# Patient Record
Sex: FEMALE | Race: WHITE | Employment: OTHER | URBAN - METROPOLITAN AREA
[De-identification: names, ages, dates, MRNs, and addresses within clinical notes are randomized per-mention and may not be internally consistent; named-entity substitution may affect disease eponyms.]

---

## 2017-02-13 ENCOUNTER — TELEPHONE (OUTPATIENT)
Dept: FAMILY MEDICINE | Facility: CLINIC | Age: 67
End: 2017-02-13

## 2017-02-13 NOTE — LETTER
[facility]  [address] [facility] [address] [facility] [id number] [phone]  February 20, 2017    Jeannine Carpio  [address] [facility] [address]    Dear Jeannine,    I care about your health and have reviewed your health plan. I have reviewed your medical conditions, medication list, and lab results and am making recommendations based on this review, to better manage your health.    You are in particular need of attention regarding:  -Depression  -Breast Cancer Screening  -Cervical Cancer Screening    I am recommending that you:  recommendations:-schedule a WELLNESS (Physical) APPOINTMENT with me.   I will check fasting labs the same day - nothing to eat except water and meds for 8-10 hours prior.    Here is a list of Health Maintenance topics that are due now or due soon:  Health Maintenance Due   Topic Date Due     HEPATITIS C SCREENING  05/04/1970     MAMMO SCREEN Q2 YR (SYSTEM ASSIGNED)  10/24/2008     PHQ-9 Q6 MONTHS (NO INBASKET)  02/18/2016     PAP Q1 YR  08/18/2016     FIT Q1 YR (NO INBASKET)  08/21/2016     INFLUENZA VACCINE (SYSTEM ASSIGNED)  09/01/2016     ADVANCE DIRECTIVE PLANNING Q5 YRS (NO INBASKET)  02/17/2017       Please call us at [phone] (or use [employer]) to address the above recommendations.     Thank you for trusting [facility] and we appreciate the opportunity to serve you.  We look forward to supporting your healthcare needs in the future.    Healthy Regards,    Susan Haase CNP/lf

## 2017-02-13 NOTE — TELEPHONE ENCOUNTER
Panel Management Review      Patient has the following on her problem list:     Depression / Dysthymia review  PHQ-9 SCORE 6/22/2012 4/10/2013 8/18/2015   Total Score 18 12 16      Patient is due for:  PHQ9 and DAP      Composite cancer screening  Chart review shows that this patient is due/due soon for the following Pap Smear, Mammogram and Fecal Colorectal (FIT)  Summary:    Patient is due/failing the following:   FIT, MAMMOGRAM, PAP and PHQ9    Action needed:   Patient needs office visit for physical and depression follow up.    Type of outreach:    [employer] message.    Questions for provider review:    None                                                                                                                                    Jennifer Castro, [facility]       Chart routed to Care Team .

## 2017-02-20 NOTE — TELEPHONE ENCOUNTER
LMTRC on home/cell number, [employer] message sent    Loretta Hernandez/CMA  [place]

## 2017-06-03 ENCOUNTER — HEALTH MAINTENANCE LETTER (OUTPATIENT)
Age: 67
End: 2017-06-03

## 2017-07-25 ENCOUNTER — OFFICE VISIT (OUTPATIENT)
Dept: FAMILY MEDICINE | Facility: CLINIC | Age: 67
End: 2017-07-25
Payer: MEDICARE

## 2017-07-25 VITALS
BODY MASS INDEX: 16.71 KG/M2 | SYSTOLIC BLOOD PRESSURE: 130 MMHG | TEMPERATURE: 98.1 F | DIASTOLIC BLOOD PRESSURE: 70 MMHG | WEIGHT: 97.9 LBS | OXYGEN SATURATION: 98 % | RESPIRATION RATE: 12 BRPM | HEART RATE: 82 BPM | HEIGHT: 64 IN

## 2017-07-25 DIAGNOSIS — Z12.11 SPECIAL SCREENING FOR MALIGNANT NEOPLASMS, COLON: ICD-10-CM

## 2017-07-25 DIAGNOSIS — R63.4 WEIGHT LOSS, UNINTENTIONAL: ICD-10-CM

## 2017-07-25 DIAGNOSIS — B35.1 DERMATOPHYTOSIS OF NAIL: ICD-10-CM

## 2017-07-25 DIAGNOSIS — F32.0 MILD MAJOR DEPRESSION (H): ICD-10-CM

## 2017-07-25 DIAGNOSIS — E55.9 VITAMIN D INSUFFICIENCY: ICD-10-CM

## 2017-07-25 DIAGNOSIS — Z00.00 MEDICARE WELCOME VISIT: Primary | ICD-10-CM

## 2017-07-25 DIAGNOSIS — Z12.4 CERVICAL CANCER SCREENING: ICD-10-CM

## 2017-07-25 DIAGNOSIS — R73.09 ELEVATED GLUCOSE: ICD-10-CM

## 2017-07-25 DIAGNOSIS — M25.50 PAIN IN JOINT, MULTIPLE SITES: ICD-10-CM

## 2017-07-25 LAB
ALBUMIN SERPL-MCNC: 4 G/DL (ref 3.4–5)
ALP SERPL-CCNC: 73 U/L (ref 40–150)
ALT SERPL W P-5'-P-CCNC: 25 U/L (ref 0–50)
ANION GAP SERPL CALCULATED.3IONS-SCNC: 6 MMOL/L (ref 3–14)
AST SERPL W P-5'-P-CCNC: 17 U/L (ref 0–45)
BASOPHILS # BLD AUTO: 0.1 10E9/L (ref 0–0.2)
BASOPHILS NFR BLD AUTO: 0.6 %
BILIRUB SERPL-MCNC: 0.9 MG/DL (ref 0.2–1.3)
BUN SERPL-MCNC: 11 MG/DL (ref 7–30)
CALCIUM SERPL-MCNC: 9.4 MG/DL (ref 8.5–10.1)
CHLORIDE SERPL-SCNC: 104 MMOL/L (ref 94–109)
CHOLEST SERPL-MCNC: 224 MG/DL
CO2 SERPL-SCNC: 28 MMOL/L (ref 20–32)
CREAT SERPL-MCNC: 0.76 MG/DL (ref 0.52–1.04)
CRP SERPL-MCNC: <2.9 MG/L (ref 0–8)
DEPRECATED CALCIDIOL+CALCIFEROL SERPL-MC: 50 UG/L (ref 20–75)
DIFFERENTIAL METHOD BLD: ABNORMAL
EOSINOPHIL # BLD AUTO: 0.3 10E9/L (ref 0–0.7)
EOSINOPHIL NFR BLD AUTO: 3.5 %
ERYTHROCYTE [DISTWIDTH] IN BLOOD BY AUTOMATED COUNT: 13.1 % (ref 10–15)
ERYTHROCYTE [SEDIMENTATION RATE] IN BLOOD BY WESTERGREN METHOD: 6 MM/H (ref 0–30)
GFR SERPL CREATININE-BSD FRML MDRD: 76 ML/MIN/1.7M2
GLUCOSE SERPL-MCNC: 103 MG/DL (ref 70–99)
HCT VFR BLD AUTO: 45.7 % (ref 35–47)
HDLC SERPL-MCNC: 52 MG/DL
HGB BLD-MCNC: 16 G/DL (ref 11.7–15.7)
LDLC SERPL CALC-MCNC: 142 MG/DL
LYMPHOCYTES # BLD AUTO: 1.5 10E9/L (ref 0.8–5.3)
LYMPHOCYTES NFR BLD AUTO: 18.6 %
MCH RBC QN AUTO: 31.4 PG (ref 26.5–33)
MCHC RBC AUTO-ENTMCNC: 35 G/DL (ref 31.5–36.5)
MCV RBC AUTO: 90 FL (ref 78–100)
MONOCYTES # BLD AUTO: 0.6 10E9/L (ref 0–1.3)
MONOCYTES NFR BLD AUTO: 7.3 %
NEUTROPHILS # BLD AUTO: 5.5 10E9/L (ref 1.6–8.3)
NEUTROPHILS NFR BLD AUTO: 70 %
NONHDLC SERPL-MCNC: 172 MG/DL
PLATELET # BLD AUTO: 220 10E9/L (ref 150–450)
POTASSIUM SERPL-SCNC: 3.9 MMOL/L (ref 3.4–5.3)
PROT SERPL-MCNC: 7 G/DL (ref 6.8–8.8)
RBC # BLD AUTO: 5.1 10E12/L (ref 3.8–5.2)
SODIUM SERPL-SCNC: 138 MMOL/L (ref 133–144)
T4 FREE SERPL-MCNC: 1.16 NG/DL (ref 0.76–1.46)
TRIGL SERPL-MCNC: 150 MG/DL
TSH SERPL DL<=0.005 MIU/L-ACNC: 5.56 MU/L (ref 0.4–4)
WBC # BLD AUTO: 7.9 10E9/L (ref 4–11)

## 2017-07-25 PROCEDURE — G0145 SCR C/V CYTO,THINLAYER,RESCR: HCPCS | Performed by: NURSE PRACTITIONER

## 2017-07-25 PROCEDURE — 80050 GENERAL HEALTH PANEL: CPT | Performed by: NURSE PRACTITIONER

## 2017-07-25 PROCEDURE — 36415 COLL VENOUS BLD VENIPUNCTURE: CPT | Performed by: NURSE PRACTITIONER

## 2017-07-25 PROCEDURE — 86140 C-REACTIVE PROTEIN: CPT | Performed by: NURSE PRACTITIONER

## 2017-07-25 PROCEDURE — 84439 ASSAY OF FREE THYROXINE: CPT | Performed by: NURSE PRACTITIONER

## 2017-07-25 PROCEDURE — 85652 RBC SED RATE AUTOMATED: CPT | Performed by: NURSE PRACTITIONER

## 2017-07-25 PROCEDURE — 80061 LIPID PANEL: CPT | Performed by: NURSE PRACTITIONER

## 2017-07-25 PROCEDURE — G0402 INITIAL PREVENTIVE EXAM: HCPCS | Performed by: NURSE PRACTITIONER

## 2017-07-25 PROCEDURE — G0476 HPV COMBO ASSAY CA SCREEN: HCPCS | Performed by: NURSE PRACTITIONER

## 2017-07-25 PROCEDURE — G0123 SCREEN CERV/VAG THIN LAYER: HCPCS | Performed by: NURSE PRACTITIONER

## 2017-07-25 PROCEDURE — 82306 VITAMIN D 25 HYDROXY: CPT | Performed by: NURSE PRACTITIONER

## 2017-07-25 PROCEDURE — 86431 RHEUMATOID FACTOR QUANT: CPT | Performed by: NURSE PRACTITIONER

## 2017-07-25 RX ORDER — CICLOPIROX 80 MG/ML
SOLUTION TOPICAL
Qty: 1 BOTTLE | Refills: 0 | Status: SHIPPED | OUTPATIENT
Start: 2017-07-25 | End: 2018-02-19

## 2017-07-25 NOTE — NURSING NOTE
"Chief Complaint   Patient presents with     Medicare Visit       Initial /70 (BP Location: Left arm, Patient Position: Chair, Cuff Size: Adult Regular)  Pulse 82  Temp 98.1  F (36.7  C) (Oral)  Resp 12  Ht 5' 4\" (1.626 m)  Wt 97 lb 14.4 oz (44.4 kg)  SpO2 98%  BMI 16.8 kg/m2 Estimated body mass index is 16.8 kg/(m^2) as calculated from the following:    Height as of this encounter: 5' 4\" (1.626 m).    Weight as of this encounter: 97 lb 14.4 oz (44.4 kg).  Medication Reconciliation: complete   Jennifer Castro CMA      "

## 2017-07-25 NOTE — MR AVS SNAPSHOT
After Visit Summary   7/25/2017    Jeannine Carpio    MRN: [id number]           Patient Information     Date Of Birth          1950        Visit Information        Provider Department      7/25/2017 8:00 AM Haase, Susan Rachele, APRN [facility]        Today's Diagnoses     [facility]    -  1    Elevated glucose        Pain in joint, multiple sites        Weight loss, unintentional        Cervical cancer screening        Special screening for malignant neoplasms, colon        Mild major depression (H)        Vitamin D insufficiency        Dermatophytosis of nail          Care Instructions      Preventive Health Recommendations    Female Ages 65 +    Yearly exam:     See your health care provider every year in order to  o Review health changes.   o Discuss preventive care.    o Review your medicines if your doctor has prescribed any.      You no longer need a yearly Pap test unless you've had an abnormal Pap test in the past 10 years. If you have vaginal symptoms, such as bleeding or discharge, be sure to talk with your provider about a Pap test.      Every 1 to 2 years, have a mammogram.  If you are over 69, talk with your health care provider about whether or not you want to continue having screening mammograms.      Every 10 years, have a colonoscopy. Or, have a yearly FIT test (stool test). These exams will check for colon cancer.       Have a cholesterol test every 5 years, or more often if your doctor advises it.       Have a diabetes test (fasting glucose) every three years. If you are at risk for diabetes, you should have this test more often.       At age 65, have a bone density scan (DEXA) to check for osteoporosis (brittle bone disease).    Shots:    Get a flu shot each year.    Get a tetanus shot every 10 years.    Talk to your doctor about your pneumonia vaccines. There are now two you should receive - Pneumovax (PPSV 23) and Prevnar (PCV 13).    Talk to  your doctor about the shingles vaccine.    Talk to your doctor about the hepatitis B vaccine.    Nutrition:     Eat at least 5 servings of fruits and vegetables each day.      Eat whole-grain bread, whole-wheat pasta and brown rice instead of white grains and rice.      Talk to your provider about Calcium and Vitamin D.     Lifestyle    Exercise at least 150 minutes a week (30 minutes a day, 5 days a week). This will help you control your weight and prevent disease.      Limit alcohol to one drink per day.      No smoking.       Wear sunscreen to prevent skin cancer.       See your dentist twice a year for an exam and cleaning.      See your eye doctor every 1 to 2 years to screen for conditions such as glaucoma, macular degeneration and cataracts.          Follow-ups after your visit        Additional Services     MENTAL HEALTH REFERRAL       Your provider has referred you to: [facility]: [facility] - Counseling (Individual/Couples/Family) - [facility] [phone]   [email or web address]/   *Patient will be contacted by [facility] scheduling partner, [facility]), to schedule an appointment.  Patients may also call [facility] to schedule.    All scheduling is subject to the client's specific insurance plan & benefits, provider/location availability, and provider clinical specialities.  Please arrive 15 minutes early for your first appointment and bring your completed paperwork.    Please be aware that coverage of these services is subject to the terms and limitations of your health insurance plan.  Call member services at your health plan with any benefit or coverage questions.                  Your next 10 appointments already scheduled     Aug 02, 2017  8:45 AM CDT   MA SCREENING DIGITAL BILATERAL with [facility]   [facility]    [address]  [place] [address]   [phone]           Do not use any powder, lotion or deodorant under your arms or on your breast. If you do, we will ask you to remove it before your exam.  Wear comfortable, two-piece clothing.  If you have any allergies, tell your care team.  Bring any previous mammograms from other facilities or have them mailed to the breast center.              Future tests that were ordered for you today     Open Future Orders        Priority Expected Expires Ordered    Fecal colorectal cancer screen (FIT) Routine 8/15/2017 10/17/2017 7/25/2017            Who to contact     If you have questions or need follow up information about today's clinic visit or your schedule please contact [facility] directly at [phone].  Normal or non-critical lab and imaging results will be communicated to you by [employer], letter or phone within 4 business days after the clinic has received the results. If you do not hear from us within 7 days, please contact the clinic through [employer] or phone. If you have a critical or abnormal lab result, we will notify you by phone as soon as possible.  Submit refill requests through [employer] or call your pharmacy and they will forward the refill request to us. Please allow 3 business days for your refill to be completed.          Additional Information About Your Visit        [employer] Information     [employer] gives you secure access to your electronic health record. If you see a primary care provider, you can also send messages to your care team and make appointments. If you have questions, please call your primary care clinic.  If you do not have a primary care provider, please call [phone] and they will assist you.        Care EveryWhere ID     This is your Care EveryWhere ID. This could be used by other organizations to access your [facility] medical records  [id number]        Your Vitals Were     Pulse Temperature Respirations Height Pulse Oximetry BMI (Body Mass  "Index)    82 98.1  F (36.7  C) (Oral) 12 5' 4\" (1.626 m) 98% 16.8 kg/m2       Blood Pressure from Last 3 Encounters:   07/25/17 130/70   08/18/15 120/64   07/08/13 114/64    Weight from Last 3 Encounters:   07/25/17 97 lb 14.4 oz (44.4 kg)   08/18/15 107 lb (48.5 kg)   07/08/13 107 lb (48.5 kg)              We Performed the Following     CBC with platelets differential     Comprehensive metabolic panel     CRP inflammation     Erythrocyte sedimentation rate auto     Lipid panel reflex to direct LDL     MENTAL HEALTH REFERRAL     PAP imaged thin layer screen     Rheumatoid factor     TSH with free T4 reflex     Vitamin D Deficiency          Today's Medication Changes          These changes are accurate as of: 7/25/17  8:54 AM.  If you have any questions, ask your nurse or doctor.               Start taking these medicines.        Dose/Directions    ciclopirox 8 % Soln   Used for:  Dermatophytosis of nail   Started by:  Haase, Susan Rachele, APRN CNP        Apply to adjacent skin and affected nails daily.  Remove with alcohol every 7 days, then repeat.   Quantity:  1 Bottle   Refills:  0            Where to get your medicines      These medications were sent to [id number] - [address] [address]    Hours:  9Am-9Pm (M-F) 9Am-6Pm (S&S) Phone:  [phone]     ciclopirox 8 % Soln                Primary Care Provider Office Phone # Fax #    Susan Rachele Haase, APRN [phone]       [facility] [id number] [place] [address]        Equal Access to Services     OLVIN GEORGE : Hadrafael Feldman, jung chester, fran patterson. So [facility] [phone].    ATENCIÓN: Si habla español, tiene a colindres disposición servicios gratuitos de asistencia lingüística. Llame al [phone].    We comply with applicable federal civil rights laws and [place] laws. We do not " discriminate on the basis of race, color, national origin, age, disability sex, sexual orientation or gender identity.            Thank you!     Thank you for choosing [facility]  for your care. Our goal is always to provide you with excellent care. Hearing back from our patients is one way we can continue to improve our services. Please take a few minutes to complete the written survey that you may receive in the mail after your visit with us. Thank you!             Your Updated Medication List - Protect others around you: Learn how to safely use, store and throw away your medicines at www.disposemymeds.org.          This list is accurate as of: 7/25/17  8:54 AM.  Always use your most recent med list.                   Brand Name Dispense Instructions for use Diagnosis    ciclopirox 8 % Soln     1 Bottle    Apply to adjacent skin and affected nails daily.  Remove with alcohol every 7 days, then repeat.    Dermatophytosis of nail       OMEPRAZOLE PO           vitamin D 2000 UNITS tablet      Take 1 tablet by mouth daily.

## 2017-07-25 NOTE — PROGRESS NOTES
"  SUBJECTIVE:   Jeannine Carpio is a 67 year old female who presents for Preventive Visit.  Are you in the first 12 months of your Medicare Part B coverage?  Yes,  Visual Acuity:  Right Eye: 20/25   Left Eye: 20/25  Both Eyes: 20/25 with glasses on    Healthy Habits:    Do you get at least three servings of calcium containing foods daily (dairy, green leafy vegetables, etc.)? yes    Amount of exercise or daily activities, outside of work: none    Problems taking medications regularly No    Medication side effects: No    Have you had an eye exam in the past two years? no    Do you see a dentist twice per year? no    Do you have sleep apnea, excessive snoring or daytime drowsiness?no    COGNITIVE SCREEN  1) Repeat 3 items (Banana, Sunrise, Chair)    2) Clock draw: NORMAL  3) 3 item recall: Recalls 2 objects   Results: NORMAL clock, 1-2 items recalled: COGNITIVE IMPAIRMENT LESS LIKELY    Mini-CogTM Copyright DILSHAD Avery. Licensed by the author for use in [facility]; reprinted with permission (amber@[facility]). All rights reserved.    Medicare welcome visit  (primary encounter diagnosis): The patient has a mammogram scheduled for next week, will receive a FIT, last had eyes checked a couple of years ago.     Elevated glucose:   Glucose   Date Value Ref Range Status   08/18/2015 105 (H) 70 - 99 mg/dL Final   ]    Pain in joint, multiple sites: The patient complains of \"lots of\" joint pain. She is aware that she has osteoarthritis but is concerned that this could be rheumatoid.     Weight loss, unintentional: The patient says she is at 97 lbs, the lowest she has ever been. However, she says her eating habits have not changed.  Wt Readings from Last 5 Encounters:   07/25/17 44.4 kg (97 lb 14.4 oz)   08/18/15 48.5 kg (107 lb)   07/08/13 48.5 kg (107 lb)   04/10/13 49.4 kg (109 lb)   06/22/12 51.5 kg (113 lb 9.6 oz)     Cervical cancer screening:last in 8/2015, due for 1 more pap prior to stopping due to age . "     Special screening for malignant neoplasms, colon: The patient agreed to send in a FIT.     Mild major depression (H): The patient recently lost her  to cancer, says her depression has been worse since. She is interested in counseling but not interested in medication, saying in the past medication has made her worse.Denies thoughts of harming self or others.    Vitamin D insufficiency: Patient says she takes her Vitamin D supplements daily.     Dermatophytosis of nail: The patient observes that her toenails are hardened, has trouble putting on shoes or other tasks.       Reviewed and updated as needed this visit by clinical staffAllergies  Meds  Problems         Reviewed and updated as needed this visit by Provider  Allergies  Meds  Problems        Social History   Substance Use Topics     Smoking status: Current Every Day Smoker     Types: Cigarettes     Smokeless tobacco: Never Used     Alcohol use No       The patient does not drink >3 drinks per day nor >7 drinks per week.    Today's PHQ-2 Score:   PHQ-2 ( 1999 Pfizer) 7/25/2017 8/18/2015   Q1: Little interest or pleasure in doing things 3 1   Q2: Feeling down, depressed or hopeless 3 2   PHQ-2 Score 6 3         Do you feel safe in your environment - Yes    Do you have a Health Care Directive?: Yes: Patient states has Advance Directive and will bring in a copy to clinic.    Current providers sharing in care for this patient include: Patient Care Team:  Haase, Susan Rachele, APRN CNP as PCP - General (Nurse Practitioner)      Hearing impairment: Yes, Feel that people are mumbling or not speaking clearly.    Ability to successfully perform activities of daily living: Yes, no assistance needed     Fall risk:  Fallen 2 or more times in the past year?: No  Any fall with injury in the past year?: No      Home safety:  none identified  click delete button to remove this line now    The following health maintenance items are reviewed in Epic and correct  as of today:  Health Maintenance   Topic Date Due     HEPATITIS C SCREENING  05/04/1970     MAMMO SCREEN Q2 YR (SYSTEM ASSIGNED)  10/24/2008     PHQ-9 Q6 MONTHS  02/18/2016     PAP Q1 YR  08/18/2016     FIT Q1 YR  08/21/2016     ADVANCE DIRECTIVE PLANNING Q5 YRS  02/17/2017     FALL RISK ASSESSMENT  05/04/2017     DEXA SCAN SCREENING (SYSTEM ASSIGNED)  05/04/2017     PNEUMOCOCCAL (1 of 2 - PCV13) 05/04/2017     INFLUENZA VACCINE (SYSTEM ASSIGNED)  09/01/2017     DEPRESSION ACTION PLAN Q1 YR  02/13/2018     TETANUS IMMUNIZATION (SYSTEM ASSIGNED)  01/13/2020     LIPID SCREEN Q5 YR FEMALE (SYSTEM ASSIGNED)  08/18/2020         Pneumonia Vaccine:Adults age 65+ who have not received previous Pneumovax (PPSV23) or PCV13 as an adult: Should first be given PCV13 AND then should be given PPSV23 6-12 months after PCV13  Mammogram Screening: Patient over age 50, mutual decision to screen reflected in health maintenance.    ROS:  C:See HPI   NEGATIVE for fever, chills  I: Positive for dark spots on head  E: NEGATIVE for vision changes or irritation, last eye exam 2 years ago  E/M: NEGATIVE for ear, mouth and throat problems  R: NEGATIVE for significant cough or SOB  B: NEGATIVE for masses, tenderness or discharge  CV: NEGATIVE for chest pain, palpitations or peripheral edema  GI: Positive for hardened stools.   NEGATIVE for nausea  : NEGATIVE for vaginal bleeding  M: See HPI  N: NEGATIVE for weakness, dizziness or paresthesias  E: POSITIVE for hair loss  P: See HPI    This document serves as a record of the services and decisions personally performed and made by Susan Haase, APRN CNP. It was created on her behalf by Tamiko Malik, a trained medical scribe.  The creation of this document is based on the scribe's personal observations and the provider's statements to the medical scribe.  Tamiko Malik, July 24, 2017 9:08 AM    OBJECTIVE:   /70 (BP Location: Left arm, Patient Position: Chair, Cuff Size: Adult Regular)   "Pulse 82  Temp 98.1  F (36.7  C) (Oral)  Resp 12  Ht 1.626 m (5' 4\")  Wt 44.4 kg (97 lb 14.4 oz)  SpO2 98%  BMI 16.8 kg/m2 Estimated body mass index is 16.8 kg/(m^2) as calculated from the following:    Height as of this encounter: 1.626 m (5' 4\").    Weight as of this encounter: 44.4 kg (97 lb 14.4 oz).  EXAM:   GENERAL APPEARANCE: healthy, alert and no distress  EYES: Eyes grossly normal to inspection, PERRL and conjunctivae and sclerae normal  HENT: ear canals and TM's normal, nose and mouth without ulcers or lesions, oropharynx clear and oral mucous membranes moist. Has partial dentures  NECK: Thyroid enlarged without nodules.   RESP: lungs clear to auscultation - no rales, rhonchi or wheezes  BREAST: normal without masses, tenderness or nipple discharge and no palpable axillary masses or adenopathy  CV: regular rate and rhythm, normal S1 S2, no S3 or S4, no murmur, click or rub, no peripheral edema and peripheral pulses strong  ABDOMEN: soft, nontender, no hepatosplenomegaly, no masses and bowel sounds normal  MS:  Swelling of joints of fingers with nodules  SKIN: Left side of scalp flat nevus, various colors, irregular shape. Toe nails yellow and thick  NEURO: Normal strength and tone, sensory exam grossly normal, mentation intact and speech normal  PSYCH: mentation appears normal and affect normal/bright    ASSESSMENT / PLAN:     Jeannine was seen today for medicare visit.    Diagnoses and all orders for this visit:    Medicare welcome visit  -     Lipid panel reflex to direct LDL    Elevated glucose  -     Comprehensive metabolic panel    Pain in joint, multiple sites: will complete testing for RA  -     CRP inflammation  -     Rheumatoid factor  -     Erythrocyte sedimentation rate auto    Weight loss, unintentional:  Grief or depression related?  Loss of  last month.  -     CBC with platelets differential  -     TSH with free T4 reflex    Cervical cancer screening  -     PAP imaged thin layer " "screen    Special screening for malignant neoplasms, colon  -     Fecal colorectal cancer screen (FIT); Future    Mild major depression (H):  Discussed medication, has tried in the past and made depression worse.   -     MENTAL HEALTH REFERRAL    Vitamin D insufficiency  -     Vitamin D Deficiency    Dermatophytosis of nail  -     ciclopirox 8 % SOLN; Apply to adjacent skin and affected nails daily.  Remove with alcohol every 7 days, then repeat.      End of Life Planning:  Patient currently has an advanced directive: Yes.  Practitioner is supportive of decision.    COUNSELING:  Reviewed preventive health counseling, as reflected in patient instructions       Colon cancer screening    Estimated body mass index is 16.8 kg/(m^2) as calculated from the following:    Height as of this encounter: 1.626 m (5' 4\").    Weight as of this encounter: 44.4 kg (97 lb 14.4 oz).  Weight management plan continue to monitor weight, increase high caloric foods   reports that she has been smoking Cigarettes.  She has never used smokeless tobacco.  Tobacco Cessation Action Plan: Information offered: Patient not interested at this time    Appropriate preventive services were discussed with this patient, including applicable screening as appropriate for cardiovascular disease, diabetes, osteopenia/osteoporosis, and glaucoma.  As appropriate for age/gender, discussed screening for colorectal cancer, prostate cancer, breast cancer, and cervical cancer. Checklist reviewing preventive services available has been given to the patient.    Reviewed patients plan of care and provided an AVS. The Basic Care Plan (routine screening as documented in Health Maintenance) for Jeannine meets the Care Plan requirement. This Care Plan has been established and reviewed with the Patient.    Counseling Resources:  ATP IV Guidelines  Pooled Cohorts Equation Calculator  Breast Cancer Risk Calculator  FRAX Risk Assessment  ICSI Preventive Guidelines  Dietary " Guidelines for Americans, 2010  USDA's MyPlate  ASA Prophylaxis  Lung CA Screening  Follow up in 2 weeks for scalp lesion removal, sooner as needed.  The information in this document, created by the medical scribe for me, accurately reflects the services I personally performed and the decisions made by me. I have reviewed and approved this document for accuracy prior to leaving the patient care area.  Susan Haase, APRN CNP July 24, 2017 9:08 AM      Susan Haase, APRN CNP  [facility]

## 2017-07-26 ASSESSMENT — PATIENT HEALTH QUESTIONNAIRE - PHQ9: SUM OF ALL RESPONSES TO PHQ QUESTIONS 1-9: 19

## 2017-07-27 LAB
COPATH REPORT: NORMAL
PAP: NORMAL

## 2017-07-27 PROCEDURE — 82274 ASSAY TEST FOR BLOOD FECAL: CPT | Performed by: NURSE PRACTITIONER

## 2017-07-28 DIAGNOSIS — Z12.11 SPECIAL SCREENING FOR MALIGNANT NEOPLASMS, COLON: ICD-10-CM

## 2017-07-28 LAB — RHEUMATOID FACT SER NEPH-ACNC: <20 IU/ML (ref 0–20)

## 2017-07-29 NOTE — PROGRESS NOTES
Rodney Paez,  Your lab results are as below:  1)  TSH (thyroid level) 5.56 which is elevated  (range 0.4-4).  Please return for a clinic visit and we can discuss starting on medication for this since you are having symptoms.   2)  Cholesterol was elevated at 224, your LDL (bad cholesterol) and your HDL (good cholesterol) were within normal range. Continue to follow a low cholesterol diet and we will recheck this in 1 year.  3)  Glucose was slightly elevated at 103 (normal fasting is <100).  4)  Tests for rheumatoid arthritis (rheumatoid factor, CRP,sed rate) are normal.   5)  Vitamin D level was normal at  50.       If you have any questions do not hesitate to call the clinic to discuss the results with me further.     Sincerely,    Susan Haase, CNP

## 2017-08-02 ENCOUNTER — RADIANT APPOINTMENT (OUTPATIENT)
Dept: MAMMOGRAPHY | Facility: CLINIC | Age: 67
End: 2017-08-02
Payer: MEDICARE

## 2017-08-02 ENCOUNTER — OFFICE VISIT (OUTPATIENT)
Dept: FAMILY MEDICINE | Facility: CLINIC | Age: 67
End: 2017-08-02
Payer: MEDICARE

## 2017-08-02 VITALS
SYSTOLIC BLOOD PRESSURE: 118 MMHG | TEMPERATURE: 98 F | OXYGEN SATURATION: 98 % | WEIGHT: 97 LBS | HEART RATE: 88 BPM | RESPIRATION RATE: 14 BRPM | BODY MASS INDEX: 16.65 KG/M2 | DIASTOLIC BLOOD PRESSURE: 60 MMHG

## 2017-08-02 DIAGNOSIS — R79.89 ELEVATED TSH: ICD-10-CM

## 2017-08-02 DIAGNOSIS — Z12.31 VISIT FOR SCREENING MAMMOGRAM: ICD-10-CM

## 2017-08-02 DIAGNOSIS — D22.4 ATYPICAL NEVUS OF SCALP: Primary | ICD-10-CM

## 2017-08-02 PROCEDURE — G0202 SCR MAMMO BI INCL CAD: HCPCS | Mod: TC

## 2017-08-02 PROCEDURE — 11305 SHAVE SKIN LESION 0.5 CM/<: CPT | Performed by: NURSE PRACTITIONER

## 2017-08-02 PROCEDURE — 88305 TISSUE EXAM BY PATHOLOGIST: CPT | Performed by: NURSE PRACTITIONER

## 2017-08-02 PROCEDURE — 99212 OFFICE O/P EST SF 10 MIN: CPT | Mod: 25 | Performed by: NURSE PRACTITIONER

## 2017-08-02 PROCEDURE — 88305 TISSUE EXAM BY PATHOLOGIST: CPT | Mod: 26 | Performed by: NURSE PRACTITIONER

## 2017-08-02 NOTE — MR AVS SNAPSHOT
After Visit Summary   8/2/2017    Jeannine Carpio    MRN: [id number]           Patient Information     Date Of Birth          1950        Visit Information        Provider Department      8/2/2017 9:15 AM Haase, Susan Rachele, APRN CNP [facility]        Today's Diagnoses     Atypical nevus of scalp    -  1    Hypothyroidism, unspecified type          Care Instructions      Keep dressing in place for 24 hours, then you can change it and apply antibiotic ointment and simple bandage.   You can shower after 24 hours    Follow up if unimproved. Call with any worsening symptoms including bleeding, severe pain or foul smell.           Follow-ups after your visit        Your next 10 appointments already scheduled     Sep 05, 2017 11:30 AM CDT   New Visit with PB Mcpherson   [facility] ([facility])    [address] [place] [id number] [phone]              Who to contact     If you have questions or need follow up information about today's clinic visit or your schedule please contact [facility] directly at [phone].  Normal or non-critical lab and imaging results will be communicated to you by Fannecthart, letter or phone within 4 business days after the clinic has received the results. If you do not hear from us within 7 days, please contact the clinic through MyChart or phone. If you have a critical or abnormal lab result, we will notify you by phone as soon as possible.  Submit refill requests through RockYou or call your pharmacy and they will forward the refill request to us. Please allow 3 business days for your refill to be completed.          Additional Information About Your Visit        Fannecthart Information     RockYou gives you secure access to your electronic health record. If you see a primary care provider, you can also send messages to your care team and make appointments. If you have  questions, please call your primary care clinic.  If you do not have a primary care provider, please call [phone] and they will assist you.        Care EveryWhere ID     This is your Care EveryWhere ID. This could be used by other organizations to access your [place] medical records  [id number]        Your Vitals Were     Pulse Temperature Respirations Pulse Oximetry BMI (Body Mass Index)       88 98  F (36.7  C) (Oral) 14 98% 16.65 kg/m2        Blood Pressure from Last 3 Encounters:   08/02/17 118/60   07/25/17 130/70   08/18/15 120/64    Weight from Last 3 Encounters:   08/02/17 97 lb (44 kg)   07/25/17 97 lb 14.4 oz (44.4 kg)   08/18/15 107 lb (48.5 kg)              Today, you had the following     No orders found for display       Primary Care Provider Office Phone # Fax #    Mayra Rachele Haase, APRN [phone]       [facility] [id number] [place] [address]        Equal Access to Services     LAKESHA GEORGE : Hadii aad ku hadasho Soomaali, waaxda luqadaha, qaybta kaalmada adeegyada, waxjazlyn castillo . So [facility] [phone].    ATENCIÓN: Si habla español, tiene a colindres disposición servicios gratuitos de asistencia lingüística. Keesha al [phone].    We comply with applicable federal civil rights laws and [place] laws. We do not discriminate on the basis of race, color, national origin, age, disability sex, sexual orientation or gender identity.            Thank you!     Thank you for choosing [facility]  for your care. Our goal is always to provide you with excellent care. Hearing back from our patients is one way we can continue to improve our services. Please take a few minutes to complete the written survey that you may receive in the mail after your visit with us. Thank you!             Your Updated Medication List - Protect others around you: Learn how to safely use, store and throw away your medicines at  www.disposemymeds.org.          This list is accurate as of: 8/2/17  9:55 AM.  Always use your most recent med list.                   Brand Name Dispense Instructions for use Diagnosis    ciclopirox 8 % Soln     1 Bottle    Apply to adjacent skin and affected nails daily.  Remove with alcohol every 7 days, then repeat.    Dermatophytosis of nail       OMEPRAZOLE PO           vitamin D 2000 UNITS tablet      Take 1 tablet by mouth daily.

## 2017-08-02 NOTE — PROGRESS NOTES
SUBJECTIVE:                                                    Jeannine Carpio is a 67 year old female who presents to clinic today for the following health issues:      Patient returns for removal of atypical nevus on the left side of her scalp. She noticed the nevus a couple of months ago.     Hair loss: Progressively. She has family history of hair loss in mother.      Problem list and histories reviewed & adjusted, as indicated.  Additional history: as documented    Patient Active Problem List   Diagnosis     Tobacco dependence     Arthralgia     Lumbar radiculopathy     CARDIOVASCULAR SCREENING; LDL GOAL LESS THAN 130     Vaginal atrophy     Menopause     DDD (degenerative disc disease), lumbar     Advanced directives, counseling/discussion     Social anxiety disorder     Mild major depression (H)     Fatigue     Vitamin D insufficiency     Elevated glucose     Past Surgical History:   Procedure Laterality Date     LEEP TX, CERVICAL      LEEP TX Cervical       Social History   Substance Use Topics     Smoking status: Current Every Day Smoker     Types: Cigarettes     Smokeless tobacco: Never Used     Alcohol use No     Family History   Problem Relation Age of Onset     DIABETES Mother      CEREBROVASCULAR DISEASE Mother      Arthritis Mother      Eye Disorder Mother      Cataracts     HEART DISEASE Mother      CHF, A-Fib     OSTEOPOROSIS Mother      Thyroid Disease Mother      Hypothyroidism     Alzheimer Disease Father      Eye Disorder Father      Cataracts     HEART DISEASE Father      Bypass     Thyroid Disease Maternal Grandmother      Thyroid Disease Brother      Hyperthyroidism     Depression Sister      Thyroid Disease Sister      Thyroid Disease Daughter      Hypothyroid         Current Outpatient Prescriptions   Medication Sig Dispense Refill     ciclopirox 8 % SOLN Apply to adjacent skin and affected nails daily.  Remove with alcohol every 7 days, then repeat. 1 Bottle 0     OMEPRAZOLE PO         Cholecalciferol (VITAMIN D) 2000 UNIT tablet Take 1 tablet by mouth daily.       Allergies   Allergen Reactions     Ibuprofen Hives     Penicillins Hives     Tetracyclines Hives         Reviewed and updated as needed this visit by clinical staff  Tobacco  Allergies  Med Hx  Surg Hx  Fam Hx  Soc Hx      Reviewed and updated as needed this visit by Provider       ROS:  Constitutional, HEENT, cardiovascular, pulmonary, GI, , musculoskeletal, neuro, skin, endocrine and psych systems are negative, except as otherwise noted.    This document serves as a record of the services and decisions personally performed and made by Susan Haase, CNP. It was created on her behalf by Yane Fitzgerald, a trained medical scribe. The creation of this document is based on the provider's statements to the medical scribe.  Yane Fitzgerald 10:00 AM August 2, 2017    OBJECTIVE:   /60 (BP Location: Left arm, Patient Position: Chair, Cuff Size: Adult Regular)  Pulse 88  Temp 98  F (36.7  C) (Oral)  Resp 14  Wt 44 kg (97 lb)  SpO2 98%  BMI 16.65 kg/m2  Body mass index is 16.65 kg/(m^2).     GENERAL: healthy, alert and no distress  SKIN: Left side of scalp: flat nevus, various colors, irregular shape.  PSYCH: mentation appears normal, affect normal/bright    ASSESSMENT/PLAN:   Jeannine was seen today for lesion removal.    Diagnoses and all orders for this visit:    Atypical nevus of scalp  -     BIOPSY SKIN/SUBQ/MUC MEM, SINGLE LESION  -     Surgical pathology exam    Elevated TSH:   will return in 6 mo for lab only to have rechecked.      Skin Biopsy Procedure Note: Scalp, left     Consent: Affirmation of informed consent was signed and scanned into the medical record. Risks, benefits and alternatives were discussed. Patient's questions were elicited and answered. Time 9:50 AM    Technique:   Skin prep Alcohol, possible reaction to betadine  Anesthesia 0.5 cc 1% lidocaine, with epi   Biopsy size 0.5 cm  Biopsy taken via     shave  Suture  none  Hemostasis  monopolar cautery      EBL:    0  Complications:  No  Tolerance:   Pt tolerated procedure well and was in stable condition.   Pathology sent Yes    Instructions:    Pt should keep dressing in place for 24 hours, then may change and apply antibiotic ointment and simple bandage. May shower after 24 hours.  Pt was instructed to call if bleeding, severe pain or foul smell.   Follow up only if unimproved.    Patient Instructions     Keep dressing in place for 24 hours, then you can change it and apply antibiotic ointment and simple bandage.   You can shower after 24 hours    Follow up if unimproved. Call with any worsening symptoms including bleeding, severe pain or foul smell.       The information in this document, created by the medical scribe for me, accurately reflects the services I personally performed and the decisions made by me. I have reviewed and approved this document for accuracy prior to leaving the patient care area.  August 2, 2017 9:49 AM    Susan Haase, APRN [facility]

## 2017-08-02 NOTE — NURSING NOTE
"Chief Complaint   Patient presents with     Lesion Removal       Initial /60 (BP Location: Left arm, Patient Position: Chair, Cuff Size: Adult Regular)  Pulse 88  Temp 98  F (36.7  C) (Oral)  Resp 14  Wt 97 lb (44 kg)  SpO2 98%  BMI 16.65 kg/m2 Estimated body mass index is 16.65 kg/(m^2) as calculated from the following:    Height as of 7/25/17: 5' 4\" (1.626 m).    Weight as of this encounter: 97 lb (44 kg).  Medication Reconciliation: complete   Jennifer Castro CMA      " patient refused

## 2017-08-02 NOTE — PATIENT INSTRUCTIONS
Keep dressing in place for 24 hours, then you can change it and apply antibiotic ointment and simple bandage.   You can shower after 24 hours    Follow up if unimproved. Call with any worsening symptoms including bleeding, severe pain or foul smell.

## 2017-08-03 LAB — HEMOCCULT STL QL IA: NEGATIVE

## 2017-08-03 NOTE — PROGRESS NOTES
Rodney Paez,  Thank you for returning your stool test.  It was negative.  We suggest that this test be completed every year.  Please let me know if you have any questions.  Sincerely,  Susan Haase, CNP

## 2017-08-04 LAB — COPATH REPORT: NORMAL

## 2017-08-05 NOTE — PROGRESS NOTES
Rodney Paez,  The lesion that we removed from your scalp did not show any malignancy or cancer.  Please let me know if you have any questions.  Sincerely,     Susan Haase,  CNP

## 2017-08-08 ENCOUNTER — HOSPITAL ENCOUNTER (OUTPATIENT)
Dept: ULTRASOUND IMAGING | Facility: CLINIC | Age: 67
End: 2017-08-08
Attending: NURSE PRACTITIONER
Payer: MEDICARE

## 2017-08-08 ENCOUNTER — HOSPITAL ENCOUNTER (OUTPATIENT)
Dept: MAMMOGRAPHY | Facility: CLINIC | Age: 67
Discharge: HOME | End: 2017-08-08
Attending: NURSE PRACTITIONER | Admitting: NURSE PRACTITIONER
Payer: MEDICARE

## 2017-08-08 DIAGNOSIS — R92.8 ABNORMAL MAMMOGRAM: ICD-10-CM

## 2017-08-08 LAB
FINAL DIAGNOSIS: NORMAL
HPV HR 12 DNA CVX QL NAA+PROBE: NEGATIVE
HPV16 DNA SPEC QL NAA+PROBE: NEGATIVE
HPV18 DNA SPEC QL NAA+PROBE: NEGATIVE
SPECIMEN DESCRIPTION: NORMAL

## 2017-08-08 PROCEDURE — G0279 TOMOSYNTHESIS, MAMMO: HCPCS

## 2017-08-08 PROCEDURE — 76642 ULTRASOUND BREAST LIMITED: CPT | Mod: LT

## 2017-08-08 NOTE — PROGRESS NOTES
Rodney Paez,  I am so glad you were able to get your mammogram completed so quickly and the results showed a cyst!  Let me know if you have any questions.  Sincerely,     Susan Haase, CNP

## 2017-09-05 ENCOUNTER — OFFICE VISIT (OUTPATIENT)
Dept: PSYCHOLOGY | Facility: CLINIC | Age: 67
End: 2017-09-05
Attending: NURSE PRACTITIONER
Payer: MEDICARE

## 2017-09-05 DIAGNOSIS — F33.1 MODERATE EPISODE OF RECURRENT MAJOR DEPRESSIVE DISORDER (H): Primary | ICD-10-CM

## 2017-09-05 DIAGNOSIS — F41.1 GAD (GENERALIZED ANXIETY DISORDER): ICD-10-CM

## 2017-09-05 PROCEDURE — 90791 PSYCH DIAGNOSTIC EVALUATION: CPT | Performed by: SOCIAL WORKER

## 2017-09-05 NOTE — Clinical Note
Hello Dr. Haase, FYI-I completed Jeannine's Psych Diagnostic Eval and have her scheduled for follow up sessions. Thank you for the referral. Michael Sanders, DAVIDSW

## 2017-09-05 NOTE — MR AVS SNAPSHOT
MRN:[id number]                      After Visit Summary   9/5/2017    Jeannine Carpio    MRN: [id number]           Visit Information        Provider Department      9/5/2017 11:30 AM Michael Kinsey [facility]      Your next 10 appointments already scheduled     Sep 14, 2017 10:30 AM CDT   Return Visit with Michael Colemanabel Tio [facility] ([facility])    [address] [place] [address] [phone]            Sep 25, 2017  1:30 PM CDT   Return Visit with Michael Davis Tio [facility] ([facility])    [address] [facility] [address] [phone]              MyChart Information     Lolayt gives you secure access to your electronic health record. If you see a primary care provider, you can also send messages to your care team and make appointments. If you have questions, please call your primary care clinic.  If you do not have a primary care provider, please call [phone] and they will assist you.        Care EveryWhere ID     This is your Care EveryWhere ID. This could be used by other organizations to access your [place] medical records  [id number]        Equal Access to Services     OLVIN GEORGE AH: Iris germaino Sokoko, waaxda luqadaha, qaybta kaalmada adeelzayada, fran newberry. So [facility] [phone].    ATENCIÓN: Si habla español, tiene a colindres disposición servicios gratuitos de asistencia lingüística. Llame al [phone].    We comply with applicable federal civil rights laws and [place] laws. We do not discriminate on the basis of race, color, national origin, age, disability sex, sexual orientation or gender identity.
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
## 2017-09-07 ASSESSMENT — PATIENT HEALTH QUESTIONNAIRE - PHQ9
SUM OF ALL RESPONSES TO PHQ QUESTIONS 1-9: 18
5. POOR APPETITE OR OVEREATING: NEARLY EVERY DAY

## 2017-09-07 ASSESSMENT — ANXIETY QUESTIONNAIRES
3. WORRYING TOO MUCH ABOUT DIFFERENT THINGS: NEARLY EVERY DAY
5. BEING SO RESTLESS THAT IT IS HARD TO SIT STILL: NEARLY EVERY DAY
2. NOT BEING ABLE TO STOP OR CONTROL WORRYING: NEARLY EVERY DAY
1. FEELING NERVOUS, ANXIOUS, OR ON EDGE: NEARLY EVERY DAY
6. BECOMING EASILY ANNOYED OR IRRITABLE: SEVERAL DAYS
GAD7 TOTAL SCORE: 19
7. FEELING AFRAID AS IF SOMETHING AWFUL MIGHT HAPPEN: NEARLY EVERY DAY

## 2017-09-08 ASSESSMENT — ANXIETY QUESTIONNAIRES: GAD7 TOTAL SCORE: 19

## 2017-09-26 ENCOUNTER — TELEPHONE (OUTPATIENT)
Dept: PSYCHOLOGY | Facility: CLINIC | Age: 67
End: 2017-09-26

## 2017-09-26 NOTE — TELEPHONE ENCOUNTER
Writer called client and left message to offer her an appointment on 10/2.  Writer encouraged client to call Intake --[phone] to get rescheduled.

## 2017-12-12 ENCOUNTER — TELEPHONE (OUTPATIENT)
Dept: FAMILY MEDICINE | Facility: CLINIC | Age: 67
End: 2017-12-12

## 2017-12-12 ENCOUNTER — MYC MEDICAL ADVICE (OUTPATIENT)
Dept: FAMILY MEDICINE | Facility: CLINIC | Age: 67
End: 2017-12-12

## 2017-12-12 NOTE — TELEPHONE ENCOUNTER
Panel Management Review      Patient has the following on her problem list:     Depression / Dysthymia review    Measure:  Needs PHQ-9 score of 4 or less during index window.  Administer PHQ-9 and if score is 5 or more, send encounter to provider for next steps.    5 - 7 month window range: 12/25/2017 - 2/25/2018    PHQ-9 SCORE 8/18/2015 7/25/2017 9/7/2017   Total Score 16 - -   Total Score - 19 18       If PHQ-9 recheck is 5 or more, route to provider for next steps.    Patient is due for:  PHQ9        Composite cancer screening  Chart review shows that this patient is due/due soon for the following None  Summary:    Patient is due/failing the following:   PHQ9    Action needed:   Patient needs to do PHQ9.    Type of outreach:    Sent [employer] message.    Questions for provider review:    None                                                                                                                                    Jennifer Castro, [facility]       Chart routed to Care Team .

## 2018-02-19 ENCOUNTER — TELEPHONE (OUTPATIENT)
Dept: FAMILY MEDICINE | Facility: CLINIC | Age: 68
End: 2018-02-19

## 2018-02-19 ENCOUNTER — OFFICE VISIT (OUTPATIENT)
Dept: FAMILY MEDICINE | Facility: CLINIC | Age: 68
End: 2018-02-19
Payer: MEDICARE

## 2018-02-19 VITALS
BODY MASS INDEX: 16.8 KG/M2 | WEIGHT: 98.4 LBS | SYSTOLIC BLOOD PRESSURE: 123 MMHG | TEMPERATURE: 97.5 F | HEART RATE: 83 BPM | RESPIRATION RATE: 14 BRPM | HEIGHT: 64 IN | DIASTOLIC BLOOD PRESSURE: 76 MMHG | OXYGEN SATURATION: 98 %

## 2018-02-19 DIAGNOSIS — E03.9 HYPOTHYROIDISM, UNSPECIFIED TYPE: ICD-10-CM

## 2018-02-19 DIAGNOSIS — F32.0 MILD MAJOR DEPRESSION (H): ICD-10-CM

## 2018-02-19 DIAGNOSIS — E46 PROTEIN-CALORIE MALNUTRITION, UNSPECIFIED SEVERITY (H): ICD-10-CM

## 2018-02-19 DIAGNOSIS — R79.89 ELEVATED TSH: Primary | ICD-10-CM

## 2018-02-19 LAB
BASOPHILS # BLD AUTO: 0.1 10E9/L (ref 0–0.2)
BASOPHILS NFR BLD AUTO: 0.8 %
DIFFERENTIAL METHOD BLD: NORMAL
EOSINOPHIL # BLD AUTO: 0.3 10E9/L (ref 0–0.7)
EOSINOPHIL NFR BLD AUTO: 5 %
ERYTHROCYTE [DISTWIDTH] IN BLOOD BY AUTOMATED COUNT: 13.7 % (ref 10–15)
HCT VFR BLD AUTO: 45.8 % (ref 35–47)
HGB BLD-MCNC: 15.5 G/DL (ref 11.7–15.7)
LYMPHOCYTES # BLD AUTO: 1.3 10E9/L (ref 0.8–5.3)
LYMPHOCYTES NFR BLD AUTO: 19.4 %
MCH RBC QN AUTO: 30.5 PG (ref 26.5–33)
MCHC RBC AUTO-ENTMCNC: 33.8 G/DL (ref 31.5–36.5)
MCV RBC AUTO: 90 FL (ref 78–100)
MONOCYTES # BLD AUTO: 0.5 10E9/L (ref 0–1.3)
MONOCYTES NFR BLD AUTO: 7.9 %
NEUTROPHILS # BLD AUTO: 4.4 10E9/L (ref 1.6–8.3)
NEUTROPHILS NFR BLD AUTO: 66.9 %
PLATELET # BLD AUTO: 215 10E9/L (ref 150–450)
RBC # BLD AUTO: 5.09 10E12/L (ref 3.8–5.2)
T4 FREE SERPL-MCNC: 1.08 NG/DL (ref 0.76–1.46)
TSH SERPL DL<=0.005 MIU/L-ACNC: 5.05 MU/L (ref 0.4–4)
WBC # BLD AUTO: 6.6 10E9/L (ref 4–11)

## 2018-02-19 PROCEDURE — 84439 ASSAY OF FREE THYROXINE: CPT | Performed by: NURSE PRACTITIONER

## 2018-02-19 PROCEDURE — 36415 COLL VENOUS BLD VENIPUNCTURE: CPT | Performed by: NURSE PRACTITIONER

## 2018-02-19 PROCEDURE — 85025 COMPLETE CBC W/AUTO DIFF WBC: CPT | Performed by: NURSE PRACTITIONER

## 2018-02-19 PROCEDURE — 99214 OFFICE O/P EST MOD 30 MIN: CPT | Performed by: NURSE PRACTITIONER

## 2018-02-19 PROCEDURE — 84443 ASSAY THYROID STIM HORMONE: CPT | Performed by: NURSE PRACTITIONER

## 2018-02-19 RX ORDER — LEVOTHYROXINE SODIUM 25 UG/1
25 TABLET ORAL DAILY
Qty: 30 TABLET | Refills: 2 | Status: SHIPPED | OUTPATIENT
Start: 2018-02-19 | End: 2018-05-13

## 2018-02-19 NOTE — NURSING NOTE
"Chief Complaint   Patient presents with     RECHECK     Thyroid        Initial /76 (BP Location: Right arm, Patient Position: Chair, Cuff Size: Adult Regular)  Pulse 83  Temp 97.5  F (36.4  C) (Oral)  Resp 14  Ht 5' 4\" (1.626 m)  Wt 98 lb 6.4 oz (44.6 kg)  SpO2 98%  BMI 16.89 kg/m2 Estimated body mass index is 16.89 kg/(m^2) as calculated from the following:    Height as of this encounter: 5' 4\" (1.626 m).    Weight as of this encounter: 98 lb 6.4 oz (44.6 kg).  Medication Reconciliation: complete   "

## 2018-02-19 NOTE — PROGRESS NOTES
SUBJECTIVE:   Jeannine Carpio is a 67 year old female who presents to clinic today for the following health issues:      Hypothyroidism Follow-up      Since last visit, patient describes the following symptoms: Weight stable, no hair loss, no skin changes, no constipation, no loose stools    Amount of exercise or physical activity: 6-7 days/week for an average of greater than 60 minutes    Problems taking medications regularly: No    Medication side effects: none    Diet: regular (no restrictions)    Patient reports inability to put on weight, currently 98 lbs. Reports eating well. She also reports increased fatigue. Takes vitamin D and omeprazole daily. Last TSH 5.56 mU/L.  Of note, interested in Pneumococcal vaccine in future.    Depression: Symptoms are manageable, she notes symptoms are more relatable to winter season. PHQ 9 score of 9.      Problem list and histories reviewed & adjusted, as indicated.  Additional history: as documented    Patient Active Problem List   Diagnosis     Tobacco dependence     Arthralgia     Lumbar radiculopathy     CARDIOVASCULAR SCREENING; LDL GOAL LESS THAN 130     Vaginal atrophy     Menopause     DDD (degenerative disc disease), lumbar     Advanced directives, counseling/discussion     Social anxiety disorder     Mild major depression (H)     Fatigue     Vitamin D insufficiency     Elevated glucose     Elevated TSH     Past Surgical History:   Procedure Laterality Date     LEEP TX, CERVICAL      LEEP TX Cervical       Social History   Substance Use Topics     Smoking status: Current Every Day Smoker     Types: Cigarettes     Smokeless tobacco: Never Used     Alcohol use No     Family History   Problem Relation Age of Onset     DIABETES Mother      CEREBROVASCULAR DISEASE Mother      Arthritis Mother      Eye Disorder Mother      Cataracts     HEART DISEASE Mother      CHF, A-Fib     OSTEOPOROSIS Mother      Thyroid Disease Mother      Hypothyroidism     Alzheimer Disease Father  "     Eye Disorder Father      Cataracts     HEART DISEASE Father      Bypass     Thyroid Disease Maternal Grandmother      Thyroid Disease Brother      Hyperthyroidism     Depression Sister      Thyroid Disease Sister      Thyroid Disease Daughter      Hypothyroid         Current Outpatient Prescriptions   Medication Sig Dispense Refill     OMEPRAZOLE PO        Cholecalciferol (VITAMIN D) 2000 UNIT tablet Take 1 tablet by mouth daily.       ciclopirox 8 % SOLN Apply to adjacent skin and affected nails daily.  Remove with alcohol every 7 days, then repeat. (Patient not taking: Reported on 2/19/2018) 1 Bottle 0     Allergies   Allergen Reactions     Ibuprofen Hives     Penicillins Hives     Tetracyclines Hives       Reviewed and updated as needed this visit by clinical staff       Reviewed and updated as needed this visit by Provider         ROS:  POSITIVE for change in weight   Constitutional, cardiovascular, pulmonary,  neuro, endocrine and psych systems are negative, except as otherwise noted.    This document serves as a record of the services and decisions personally performed and made by Susan Haase, CNP. It was created on her behalf by Carolynn Browning, a trained medical scribe. The creation of this document is based on the provider's statements to the medical scribe.  Carolynn Browning 8:02 AM February 19, 2018  OBJECTIVE:   /76 (BP Location: Right arm, Patient Position: Chair, Cuff Size: Adult Regular)  Pulse 83  Temp 97.5  F (36.4  C) (Oral)  Resp 14  Ht 1.626 m (5' 4\")  Wt 44.6 kg (98 lb 6.4 oz)  SpO2 98%  BMI 16.89 kg/m2  Body mass index is 16.89 kg/(m^2).  GENERAL: healthy, alert and no distress  HENT: ear canals and TM's normal, nose and mouth without ulcers or lesions  NECK: no adenopathy, no asymmetry, masses, or scars and thyroid normal to palpation  RESP: lungs clear to auscultation - no rales, rhonchi or wheezes  CV: regular rate and rhythm, normal S1 S2, no S3 or S4, no murmur, click or rub, no " peripheral edema and peripheral pulses strong  PSYCH: mentation appears normal, affect normal/bright  ASSESSMENT/PLAN:   Jeannine was seen today for recheck.    Diagnoses and all orders for this visit:    Elevated TSH  -     TSH with free T4 reflex  -     CBC with platelets differential    Mild major depression (H): well controlled, PHQ 9 score of 9.    Protein-calorie malnutrition, unspecified severity (H): weight stable since 7/2017, discussed increase in high calorie/high protein foods.     Other orders  -     DEPRESSION ACTION PLAN (DAP)      Follow up in 6 months, sooner as needed    The information in this document, created by the medical scribe for me, accurately reflects the services I personally performed and the decisions made by me. I have reviewed and approved this document for accuracy prior to leaving the patient care area.  February 19, 2018 8:06 AM  Susan Haase, APRN [facility]

## 2018-02-19 NOTE — MR AVS SNAPSHOT
"              After Visit Summary   2/19/2018    Jeannine Carpio    MRN: [id number]           Patient Information     Date Of Birth          1950        Visit Information        Provider Department      2/19/2018 7:45 AM Haase, Susan Rachele, APRN CNP [facility]        Today's Diagnoses     Elevated TSH    -  1    Mild major depression (H)           Follow-ups after your visit        Follow-up notes from your care team     Return in about 6 months (around 8/19/2018) for Physical Exam.      Who to contact     If you have questions or need follow up information about today's clinic visit or your schedule please contact [facility] directly at [phone].  Normal or non-critical lab and imaging results will be communicated to you by Transceptahart, letter or phone within 4 business days after the clinic has received the results. If you do not hear from us within 7 days, please contact the clinic through Transceptahart or phone. If you have a critical or abnormal lab result, we will notify you by phone as soon as possible.  Submit refill requests through Renovar or call your pharmacy and they will forward the refill request to us. Please allow 3 business days for your refill to be completed.          Additional Information About Your Visit        MyChart Information     Renovar gives you secure access to your electronic health record. If you see a primary care provider, you can also send messages to your care team and make appointments. If you have questions, please call your primary care clinic.  If you do not have a primary care provider, please call [phone] and they will assist you.        Care EveryWhere ID     This is your Care EveryWhere ID. This could be used by other organizations to access your [place] medical records  [id number]        Your Vitals Were     Pulse Temperature Respirations Height Pulse Oximetry BMI (Body Mass Index)    83 97.5  F (36.4  C) (Oral) 14 5' 4\" " (1.626 m) 98% 16.89 kg/m2       Blood Pressure from Last 3 Encounters:   02/19/18 123/76   08/02/17 118/60   07/25/17 130/70    Weight from Last 3 Encounters:   02/19/18 98 lb 6.4 oz (44.6 kg)   08/02/17 97 lb (44 kg)   07/25/17 97 lb 14.4 oz (44.4 kg)              We Performed the Following     CBC with platelets differential     DEPRESSION ACTION PLAN (DAP)     TSH with free T4 reflex        Primary Care Provider Office Phone # Fax #    Susan Rachele Haase, APRN [phone]       [address] [facility] [address]        Equal Access to Services     OLVIN GEORGE : Hadii frida Feldman, waaxda avivaadaha, qaybta kaalmada hien, fran castillo . So [facility] [phone].    ATENCIÓN: Si habla español, tiene a colindres disposición servicios gratuitos de asistencia lingüística. Llame al [phone].    We comply with applicable federal civil rights laws and [place] laws. We do not discriminate on the basis of race, color, national origin, age, disability, sex, sexual orientation, or gender identity.            Thank you!     Thank you for choosing [facility]  for your care. Our goal is always to provide you with excellent care. Hearing back from our patients is one way we can continue to improve our services. Please take a few minutes to complete the written survey that you may receive in the mail after your visit with us. Thank you!             Your Updated Medication List - Protect others around you: Learn how to safely use, store and throw away your medicines at www.disposemymeds.org.          This list is accurate as of 2/19/18  8:08 AM.  Always use your most recent med list.                   Brand Name Dispense Instructions for use Diagnosis    OMEPRAZOLE PO           vitamin D 2000 UNITS tablet      Take 1 tablet by mouth daily.

## 2018-02-19 NOTE — LETTER
February 20, 2018      Jeannine Carpio  [address]  [place] [address]        Dear ,    We are writing to inform you of your test results.    Your CBC (checks for anemia and infection) was normal.  Your TSH (thyroid level) continues to be elevated at 5.05.  I would like you to start on a low dose of levothyroxine, take 25 mcg every morning, make sure and take this 30 min prior to taking any other medications.  Please follow up in 2-3 months for a lab only appointment and we will recheck your level.     Resulted Orders   TSH with free T4 reflex   Result Value Ref Range    TSH 5.05 (H) 0.40 - 4.00 mU/L   CBC with platelets differential   Result Value Ref Range    WBC 6.6 4.0 - 11.0 10e9/L    RBC Count 5.09 3.8 - 5.2 10e12/L    Hemoglobin 15.5 11.7 - 15.7 g/dL    Hematocrit 45.8 35.0 - 47.0 %    MCV 90 78 - 100 fl    MCH 30.5 26.5 - 33.0 pg    MCHC 33.8 31.5 - 36.5 g/dL    RDW 13.7 10.0 - 15.0 %    Platelet Count 215 150 - 450 10e9/L    Diff Method Automated Method     % Neutrophils 66.9 %    % Lymphocytes 19.4 %    % Monocytes 7.9 %    % Eosinophils 5.0 %    % Basophils 0.8 %    Absolute Neutrophil 4.4 1.6 - 8.3 10e9/L    Absolute Lymphocytes 1.3 0.8 - 5.3 10e9/L    Absolute Monocytes 0.5 0.0 - 1.3 10e9/L    Absolute Eosinophils 0.3 0.0 - 0.7 10e9/L    Absolute Basophils 0.1 0.0 - 0.2 10e9/L   T4 free   Result Value Ref Range    T4 Free 1.08 0.76 - 1.46 ng/dL       If you have any questions or concerns, please call the clinic at the number listed above.       Sincerely,        Susan Haase, APRN CNP/lf

## 2018-02-20 ASSESSMENT — PATIENT HEALTH QUESTIONNAIRE - PHQ9: SUM OF ALL RESPONSES TO PHQ QUESTIONS 1-9: 9

## 2018-02-20 NOTE — TELEPHONE ENCOUNTER
Attempted pt, phone number does not go thru,  sent letter today  Vandana Russell, RN, BSN  Message handled by Nurse Triage with Huddle - provider name: .

## 2018-02-20 NOTE — TELEPHONE ENCOUNTER
Can you please call jeannine and let her know the following:    Hi Jeannien,  Your CBC (checks for anemia and infection) was normal.  Your TSH (thyroid level) continues to be elevated at 5.05.  I would like you to start on a low dose of levothyroxine, take 25 mcg every morning, make sure and take this 30 min prior to taking any other medications.  Please follow up in 2-3 months for a lab only appointment and we will recheck your level.   Sincerely,    Susan Haase, CNP

## 2018-05-12 DIAGNOSIS — E03.9 HYPOTHYROIDISM, UNSPECIFIED TYPE: ICD-10-CM

## 2018-05-12 LAB
T4 FREE SERPL-MCNC: 1.28 NG/DL (ref 0.76–1.46)
TSH SERPL DL<=0.005 MIU/L-ACNC: 4.09 MU/L (ref 0.4–4)

## 2018-05-12 PROCEDURE — 84443 ASSAY THYROID STIM HORMONE: CPT | Performed by: NURSE PRACTITIONER

## 2018-05-12 PROCEDURE — 84439 ASSAY OF FREE THYROXINE: CPT | Performed by: NURSE PRACTITIONER

## 2018-05-12 PROCEDURE — 36415 COLL VENOUS BLD VENIPUNCTURE: CPT | Performed by: NURSE PRACTITIONER

## 2018-05-13 DIAGNOSIS — E03.9 HYPOTHYROIDISM, UNSPECIFIED TYPE: ICD-10-CM

## 2018-05-13 RX ORDER — LEVOTHYROXINE SODIUM 25 UG/1
25 TABLET ORAL DAILY
Qty: 90 TABLET | Refills: 3 | Status: SHIPPED | OUTPATIENT
Start: 2018-05-13 | End: 2018-10-09

## 2018-09-11 ENCOUNTER — OFFICE VISIT (OUTPATIENT)
Dept: FAMILY MEDICINE | Facility: CLINIC | Age: 68
End: 2018-09-11
Payer: MEDICARE

## 2018-09-11 VITALS
DIASTOLIC BLOOD PRESSURE: 70 MMHG | RESPIRATION RATE: 10 BRPM | OXYGEN SATURATION: 94 % | BODY MASS INDEX: 16.31 KG/M2 | WEIGHT: 95 LBS | SYSTOLIC BLOOD PRESSURE: 110 MMHG | HEART RATE: 88 BPM | TEMPERATURE: 97.9 F

## 2018-09-11 DIAGNOSIS — F32.0 MILD MAJOR DEPRESSION (H): ICD-10-CM

## 2018-09-11 DIAGNOSIS — R73.09 ELEVATED GLUCOSE: ICD-10-CM

## 2018-09-11 DIAGNOSIS — Z12.11 COLON CANCER SCREENING: ICD-10-CM

## 2018-09-11 DIAGNOSIS — E46 PROTEIN-CALORIE MALNUTRITION, UNSPECIFIED SEVERITY (H): ICD-10-CM

## 2018-09-11 DIAGNOSIS — E03.9 HYPOTHYROIDISM, UNSPECIFIED TYPE: Primary | ICD-10-CM

## 2018-09-11 DIAGNOSIS — E55.9 VITAMIN D INSUFFICIENCY: ICD-10-CM

## 2018-09-11 LAB
BASOPHILS # BLD AUTO: 0.1 10E9/L (ref 0–0.2)
BASOPHILS NFR BLD AUTO: 0.8 %
DIFFERENTIAL METHOD BLD: NORMAL
EOSINOPHIL # BLD AUTO: 0.2 10E9/L (ref 0–0.7)
EOSINOPHIL NFR BLD AUTO: 2.8 %
ERYTHROCYTE [DISTWIDTH] IN BLOOD BY AUTOMATED COUNT: 13.3 % (ref 10–15)
HCT VFR BLD AUTO: 46.5 % (ref 35–47)
HGB BLD-MCNC: 15.6 G/DL (ref 11.7–15.7)
LYMPHOCYTES # BLD AUTO: 1.6 10E9/L (ref 0.8–5.3)
LYMPHOCYTES NFR BLD AUTO: 19.9 %
MCH RBC QN AUTO: 30.5 PG (ref 26.5–33)
MCHC RBC AUTO-ENTMCNC: 33.5 G/DL (ref 31.5–36.5)
MCV RBC AUTO: 91 FL (ref 78–100)
MONOCYTES # BLD AUTO: 0.6 10E9/L (ref 0–1.3)
MONOCYTES NFR BLD AUTO: 7.4 %
NEUTROPHILS # BLD AUTO: 5.5 10E9/L (ref 1.6–8.3)
NEUTROPHILS NFR BLD AUTO: 69.1 %
PLATELET # BLD AUTO: 239 10E9/L (ref 150–450)
RBC # BLD AUTO: 5.11 10E12/L (ref 3.8–5.2)
WBC # BLD AUTO: 7.9 10E9/L (ref 4–11)

## 2018-09-11 PROCEDURE — 36415 COLL VENOUS BLD VENIPUNCTURE: CPT | Performed by: NURSE PRACTITIONER

## 2018-09-11 PROCEDURE — 84443 ASSAY THYROID STIM HORMONE: CPT | Performed by: NURSE PRACTITIONER

## 2018-09-11 PROCEDURE — 85025 COMPLETE CBC W/AUTO DIFF WBC: CPT | Performed by: NURSE PRACTITIONER

## 2018-09-11 PROCEDURE — 80053 COMPREHEN METABOLIC PANEL: CPT | Performed by: NURSE PRACTITIONER

## 2018-09-11 PROCEDURE — 82306 VITAMIN D 25 HYDROXY: CPT | Performed by: NURSE PRACTITIONER

## 2018-09-11 PROCEDURE — 99214 OFFICE O/P EST MOD 30 MIN: CPT | Performed by: NURSE PRACTITIONER

## 2018-09-11 PROCEDURE — 84439 ASSAY OF FREE THYROXINE: CPT | Performed by: NURSE PRACTITIONER

## 2018-09-11 PROCEDURE — 83036 HEMOGLOBIN GLYCOSYLATED A1C: CPT | Performed by: NURSE PRACTITIONER

## 2018-09-11 ASSESSMENT — PATIENT HEALTH QUESTIONNAIRE - PHQ9
SUM OF ALL RESPONSES TO PHQ QUESTIONS 1-9: 10
SUM OF ALL RESPONSES TO PHQ QUESTIONS 1-9: 10

## 2018-09-11 NOTE — MR AVS SNAPSHOT
After Visit Summary   9/11/2018    Jeannine Carpio    MRN: [id number]           Patient Information     Date Of Birth          1950        Visit Information        Provider Department      9/11/2018 9:30 AM Haase, Susan Rachele, APRN CNP [facility]        Today's Diagnoses     Colon cancer screening    -  1    Vitamin D insufficiency        Hypothyroidism, unspecified type        Other fatigue        Mild major depression (H)           Follow-ups after your visit        Additional Services     MENTAL HEALTH REFERRAL  - Adult; Outpatient Treatment; Individual/Couples/Family/Group Therapy/Health Psychology; Other: Not Listed - Enter Referral Details in Scheduling Comments Below       All scheduling is subject to the client's specific insurance plan & benefits, provider/location availability, and provider clinical specialities.  Please arrive 15 minutes early for your first appointment and bring your completed paperwork.  [facility]:  [phone]  Please be aware that coverage of these services is subject to the terms and limitations of your health insurance plan.  Call member services at your health plan with any benefit or coverage questions.                  Follow-up notes from your care team     Return in about 4 weeks (around 10/9/2018).      Future tests that were ordered for you today     Open Future Orders        Priority Expected Expires Ordered    Fecal colorectal cancer screen (FIT) Routine 10/2/2018 12/4/2018 9/11/2018            Who to contact     If you have questions or need follow up information about today's clinic visit or your schedule please contact [facility] directly at [phone].  Normal or non-critical lab and imaging results will be communicated to you by MyChart, letter or phone within 4 business days after the clinic has received the results. If you do not hear from us within 7 days, please contact the clinic  through Elimihart or phone. If you have a critical or abnormal lab result, we will notify you by phone as soon as possible.  Submit refill requests through [facility] EHRt or call your pharmacy and they will forward the refill request to us. Please allow 3 business days for your refill to be completed.          Additional Information About Your Visit        Care EveryWhere ID     This is your Care EveryWhere ID. This could be used by other organizations to access your [place] medical records  [id number]        Your Vitals Were     Pulse Temperature Respirations Pulse Oximetry BMI (Body Mass Index)       88 97.9  F (36.6  C) (Oral) 10 94% 16.31 kg/m2        Blood Pressure from Last 3 Encounters:   09/11/18 110/70   02/19/18 123/76   08/02/17 118/60    Weight from Last 3 Encounters:   09/11/18 95 lb (43.1 kg)   02/19/18 98 lb 6.4 oz (44.6 kg)   08/02/17 97 lb (44 kg)              We Performed the Following     CBC with platelets differential     Comprehensive metabolic panel     MENTAL HEALTH REFERRAL  - Adult; Outpatient Treatment; Individual/Couples/Family/Group Therapy/Health Psychology; Other: Not Listed - Enter Referral Details in Scheduling Comments Below     T4 free     TSH     Vitamin D Deficiency        Primary Care Provider Office Phone # Fax #    Mayra Richardson Haase, EDITA [phone] [address] [address] [address]        Equal Access to Services     OLVIN GEORGE : Hadrafael Feldman, waaxda luqadaha, qaybta kaalmafran walker. So [facility] [phone].    ATENCIÓN: Si habla español, tiene a colindres disposición servicios gratuitos de asistencia lingüística. Keesha al [phone].    We comply with applicable federal civil rights laws and [place] laws. We do not discriminate on the basis of race, color, national origin, age, disability, sex, sexual orientation, or gender identity.            Thank you!     Thank you for choosing [facility]  for your care. Our goal is always to provide you with excellent care. Hearing back from our patients is one way we can continue to improve our services. Please take a few minutes to complete the written survey that you may receive in the mail after your visit with us. Thank you!             Your Updated Medication List - Protect others around you: Learn how to safely use, store and throw away your medicines at www.disposemymeds.org.          This list is accurate as of 9/11/18  9:59 AM.  Always use your most recent med list.                   Brand Name Dispense Instructions for use Diagnosis    levothyroxine 25 MCG tablet    SYNTHROID/LEVOTHROID    90 tablet    Take 1 tablet (25 mcg) by mouth daily    Hypothyroidism, unspecified type       OMEPRAZOLE PO           vitamin D 2000 units tablet      Take 1 tablet by mouth daily.

## 2018-09-11 NOTE — LETTER
September 13, 2018      Jeannine Carpio  [address] [address] [address]        Dear ,    We are writing to inform you of your test results.      Your lab results are as below:   1)  TSH (thyroid level) 2.90 which is normal (range 0.4-4)   2)  Vitamin D level is normal at 35.   3)  Glucose is elevated at 155 (normal fasting is <100).   4)  CBC (checks for anemia and infection) is normal.     Please follow up with me in 4 weeks for a clinic visit, I am concerned about your fatigue and weight loss.     Resulted Orders   CBC with platelets differential   Result Value Ref Range    WBC 7.9 4.0 - 11.0 10e9/L    RBC Count 5.11 3.8 - 5.2 10e12/L    Hemoglobin 15.6 11.7 - 15.7 g/dL    Hematocrit 46.5 35.0 - 47.0 %    MCV 91 78 - 100 fl    MCH 30.5 26.5 - 33.0 pg    MCHC 33.5 31.5 - 36.5 g/dL    RDW 13.3 10.0 - 15.0 %    Platelet Count 239 150 - 450 10e9/L    Diff Method Automated Method     % Neutrophils 69.1 %    % Lymphocytes 19.9 %    % Monocytes 7.4 %    % Eosinophils 2.8 %    % Basophils 0.8 %    Absolute Neutrophil 5.5 1.6 - 8.3 10e9/L    Absolute Lymphocytes 1.6 0.8 - 5.3 10e9/L    Absolute Monocytes 0.6 0.0 - 1.3 10e9/L    Absolute Eosinophils 0.2 0.0 - 0.7 10e9/L    Absolute Basophils 0.1 0.0 - 0.2 10e9/L   TSH   Result Value Ref Range    TSH 2.90 0.40 - 4.00 mU/L   T4 free   Result Value Ref Range    T4 Free 1.20 0.76 - 1.46 ng/dL   Comprehensive metabolic panel   Result Value Ref Range    Sodium 139 133 - 144 mmol/L    Potassium 4.2 3.4 - 5.3 mmol/L    Chloride 104 94 - 109 mmol/L    Carbon Dioxide 27 20 - 32 mmol/L    Anion Gap 8 3 - 14 mmol/L    Glucose 155 (H) 70 - 99 mg/dL      Comment:      Non Fasting    Urea Nitrogen 12 7 - 30 mg/dL    Creatinine 0.65 0.52 - 1.04 mg/dL    GFR Estimate >90 >60 mL/min/1.7m2      Comment:      Non  GFR Calc    GFR Estimate If Black >90 >60 mL/min/1.7m2      Comment:       GFR Calc    Calcium 8.9 8.5 - 10.1 mg/dL    Bilirubin  Total 0.7 0.2 - 1.3 mg/dL    Albumin 4.0 3.4 - 5.0 g/dL    Protein Total 7.4 6.8 - 8.8 g/dL    Alkaline Phosphatase 73 40 - 150 U/L    ALT 22 0 - 50 U/L    AST 18 0 - 45 U/L   Vitamin D Deficiency   Result Value Ref Range    Vitamin D Deficiency screening 35 20 - 75 ug/L      Comment:      Season, race, dietary intake, and treatment affect the concentration of   25-hydroxy-Vitamin D. Values may decrease during winter months and increase   during summer months. Values 20-29 ug/L may indicate Vitamin D insufficiency   and values <20 ug/L may indicate Vitamin D deficiency.  Vitamin D determination is routinely performed by an immunoassay specific for   25 hydroxyvitamin D3.  If an individual is on vitamin D2 (ergocalciferol)   supplementation, please specify 25 OH vitamin D2 and D3 level determination by   LCMSMS test [id number].         If you have any questions or concerns, please call the clinic at the number listed above.       Sincerely,        Susan Haase, APRN CNP

## 2018-09-11 NOTE — PROGRESS NOTES
SUBJECTIVE:   Jeannine Carpio is a 68 year old female who presents to clinic today for the following health issues:      History of Present Illness     Hypothyroidism:     Since last visit, patient describes the following symptoms::  Anxiety, Constipation, Depression, Dry skin, Fatigue, Hair loss and Weight loss    Weight loss::  Less than 5 lbs.    Last visit 2/2018, TSH 4.24. Taking levothyroxine as prescribed.    Weight loss:  Has lost 3 lbs since last visit, reports eating her normal amount, appetite normal.     Depression:  PHQ 9 score of 10, denies thoughts of harming self or others.  Has tried various anti depressants in the past with side effects.  Is interested in counseling.     Need pneumo, dexa, phq 9 fit german  Problem list and histories reviewed & adjusted, as indicated.  Additional history: as documented        Patient Active Problem List   Diagnosis     Tobacco dependence     Arthralgia     Lumbar radiculopathy     CARDIOVASCULAR SCREENING; LDL GOAL LESS THAN 130     Vaginal atrophy     Menopause     DDD (degenerative disc disease), lumbar     Advanced directives, counseling/discussion     Social anxiety disorder     Mild major depression (H)     Fatigue     Vitamin D insufficiency     Elevated glucose     Protein-calorie malnutrition, unspecified severity (H)     Hypothyroidism, unspecified type     Past Surgical History:   Procedure Laterality Date     LEEP TX, CERVICAL      LEEP TX Cervical       Social History   Substance Use Topics     Smoking status: Current Every Day Smoker     Types: Cigarettes     Smokeless tobacco: Never Used     Alcohol use No     Family History   Problem Relation Age of Onset     Diabetes Mother      Cerebrovascular Disease Mother      Arthritis Mother      Eye Disorder Mother      Cataracts     HEART DISEASE Mother      CHF, A-Fib     Osteoporosis Mother      Thyroid Disease Mother      Hypothyroidism     Alzheimer Disease Father      Eye Disorder Father      Cataracts      HEART DISEASE Father      Bypass     Thyroid Disease Maternal Grandmother      Thyroid Disease Brother      Hyperthyroidism     Depression Sister      Thyroid Disease Sister      Thyroid Disease Daughter      Hypothyroid         Current Outpatient Prescriptions   Medication Sig Dispense Refill     Cholecalciferol (VITAMIN D) 2000 UNIT tablet Take 1 tablet by mouth daily.       levothyroxine (SYNTHROID/LEVOTHROID) 25 MCG tablet Take 1 tablet (25 mcg) by mouth daily 90 tablet 3     OMEPRAZOLE PO        Allergies   Allergen Reactions     Ibuprofen Hives     Penicillins Hives     Tetracyclines Hives     BP Readings from Last 3 Encounters:   09/11/18 110/70   02/19/18 123/76   08/02/17 118/60    Wt Readings from Last 3 Encounters:   09/11/18 95 lb (43.1 kg)   02/19/18 98 lb 6.4 oz (44.6 kg)   08/02/17 97 lb (44 kg)                    ROS:  CONSTITUTIONAL: NEGATIVE for fever, chills, change in weight  ENT/MOUTH: NEGATIVE for ear, mouth and throat problems  RESP: NEGATIVE for significant cough or SOB  CV: NEGATIVE for chest pain, palpitations or peripheral edema  GI: NEGATIVE for nausea, abdominal pain, heartburn, or change in bowel habits  NEURO: see HPI  PSYCHIATRIC: NEGATIVE for changes in mood or affect    OBJECTIVE:     /70 (BP Location: Left arm, Patient Position: Chair, Cuff Size: Adult Regular)  Pulse 88  Temp 97.9  F (36.6  C) (Oral)  Resp 10  Wt 95 lb (43.1 kg)  SpO2 94%  BMI 16.31 kg/m2  Body mass index is 16.31 kg/(m^2).   GENERAL: healthy, alert and no distress  HENT: ear canals and TM's normal, nose and mouth without ulcers or lesions  NECK: no adenopathy, no asymmetry, masses, or scars and thyroid normal to palpation  RESP: lungs clear to auscultation - no rales, rhonchi or wheezes  CV: regular rate and rhythm, normal S1 S2, no S3 or S4, no murmur, click or rub, no peripheral edema and peripheral pulses strong  PSYCH: mentation appears normal, affect normal/bright      ASSESSMENT:   See  below    PLAN:   Jeannine was seen today for recheck medication.    Diagnoses and all orders for this visit:    Hypothyroidism, unspecified type:  Taking levothyroxine 25 mcg every day, will call if medication needs to be adjusted.   -     TSH  -     T4 free    Protein-calorie malnutrition, unspecified severity (H): weight loss of 3 lbs since visit in 2/2018. If TSH is normal would suggest return in 4 weeks for weight check.   -     CBC with platelets differential  -     Comprehensive metabolic panel    Mild major depression (H): PHQ 9 score of 10, declines medication for symptoms, would like a referral for counseling.   -     MENTAL HEALTH REFERRAL  - Adult; Outpatient Treatment; Individual/Couples/Family/Group Therapy/Health Psychology; Other: Not Listed - Enter Referral Details in Scheduling Comments Below    Colon cancer screening  -     Fecal colorectal cancer screen (FIT); Future    Vitamin D insufficiency  -     Vitamin D Deficiency    Follow up in 4 weeks, sooner as needed.     Susan Haase, APRN [facility]

## 2018-09-12 LAB
ALBUMIN SERPL-MCNC: 4 G/DL (ref 3.4–5)
ALP SERPL-CCNC: 73 U/L (ref 40–150)
ALT SERPL W P-5'-P-CCNC: 22 U/L (ref 0–50)
ANION GAP SERPL CALCULATED.3IONS-SCNC: 8 MMOL/L (ref 3–14)
AST SERPL W P-5'-P-CCNC: 18 U/L (ref 0–45)
BILIRUB SERPL-MCNC: 0.7 MG/DL (ref 0.2–1.3)
BUN SERPL-MCNC: 12 MG/DL (ref 7–30)
CALCIUM SERPL-MCNC: 8.9 MG/DL (ref 8.5–10.1)
CHLORIDE SERPL-SCNC: 104 MMOL/L (ref 94–109)
CO2 SERPL-SCNC: 27 MMOL/L (ref 20–32)
CREAT SERPL-MCNC: 0.65 MG/DL (ref 0.52–1.04)
DEPRECATED CALCIDIOL+CALCIFEROL SERPL-MC: 35 UG/L (ref 20–75)
GFR SERPL CREATININE-BSD FRML MDRD: >90 ML/MIN/1.7M2
GLUCOSE SERPL-MCNC: 155 MG/DL (ref 70–99)
POTASSIUM SERPL-SCNC: 4.2 MMOL/L (ref 3.4–5.3)
PROT SERPL-MCNC: 7.4 G/DL (ref 6.8–8.8)
SODIUM SERPL-SCNC: 139 MMOL/L (ref 133–144)
T4 FREE SERPL-MCNC: 1.2 NG/DL (ref 0.76–1.46)
TSH SERPL DL<=0.005 MIU/L-ACNC: 2.9 MU/L (ref 0.4–4)

## 2018-09-12 ASSESSMENT — PATIENT HEALTH QUESTIONNAIRE - PHQ9: SUM OF ALL RESPONSES TO PHQ QUESTIONS 1-9: 10

## 2018-09-13 LAB — HBA1C MFR BLD: 5.6 % (ref 0–5.6)

## 2018-09-18 PROCEDURE — 82274 ASSAY TEST FOR BLOOD FECAL: CPT | Performed by: NURSE PRACTITIONER

## 2018-09-21 DIAGNOSIS — Z12.11 COLON CANCER SCREENING: ICD-10-CM

## 2018-09-21 LAB — HEMOCCULT STL QL IA: NEGATIVE

## 2018-10-09 ENCOUNTER — OFFICE VISIT (OUTPATIENT)
Dept: FAMILY MEDICINE | Facility: CLINIC | Age: 68
End: 2018-10-09
Payer: MEDICARE

## 2018-10-09 VITALS
RESPIRATION RATE: 12 BRPM | HEART RATE: 74 BPM | SYSTOLIC BLOOD PRESSURE: 118 MMHG | BODY MASS INDEX: 16.48 KG/M2 | TEMPERATURE: 97.6 F | OXYGEN SATURATION: 99 % | WEIGHT: 96 LBS | DIASTOLIC BLOOD PRESSURE: 60 MMHG

## 2018-10-09 DIAGNOSIS — E03.9 HYPOTHYROIDISM, UNSPECIFIED TYPE: Primary | ICD-10-CM

## 2018-10-09 DIAGNOSIS — M54.42 ACUTE LEFT-SIDED LOW BACK PAIN WITH LEFT-SIDED SCIATICA: ICD-10-CM

## 2018-10-09 DIAGNOSIS — F17.200 TOBACCO DEPENDENCE: ICD-10-CM

## 2018-10-09 DIAGNOSIS — E46 PROTEIN-CALORIE MALNUTRITION, UNSPECIFIED SEVERITY (H): ICD-10-CM

## 2018-10-09 LAB
T3FREE SERPL-MCNC: 2.3 PG/ML (ref 2.3–4.2)
T4 FREE SERPL-MCNC: 1.14 NG/DL (ref 0.76–1.46)
TSH SERPL DL<=0.005 MIU/L-ACNC: 3.41 MU/L (ref 0.4–4)

## 2018-10-09 PROCEDURE — 84439 ASSAY OF FREE THYROXINE: CPT | Performed by: NURSE PRACTITIONER

## 2018-10-09 PROCEDURE — 99214 OFFICE O/P EST MOD 30 MIN: CPT | Performed by: NURSE PRACTITIONER

## 2018-10-09 PROCEDURE — 84481 FREE ASSAY (FT-3): CPT | Performed by: NURSE PRACTITIONER

## 2018-10-09 PROCEDURE — 84443 ASSAY THYROID STIM HORMONE: CPT | Performed by: NURSE PRACTITIONER

## 2018-10-09 PROCEDURE — 36415 COLL VENOUS BLD VENIPUNCTURE: CPT | Performed by: NURSE PRACTITIONER

## 2018-10-09 RX ORDER — LEVOTHYROXINE SODIUM 25 UG/1
25 TABLET ORAL DAILY
Qty: 90 TABLET | Refills: 3 | Status: SHIPPED | OUTPATIENT
Start: 2018-10-09 | End: 2019-08-23

## 2018-10-09 ASSESSMENT — ANXIETY QUESTIONNAIRES
GAD7 TOTAL SCORE: 16
3. WORRYING TOO MUCH ABOUT DIFFERENT THINGS: NEARLY EVERY DAY
4. TROUBLE RELAXING: NEARLY EVERY DAY
5. BEING SO RESTLESS THAT IT IS HARD TO SIT STILL: NEARLY EVERY DAY
2. NOT BEING ABLE TO STOP OR CONTROL WORRYING: NEARLY EVERY DAY
GAD7 TOTAL SCORE: 16
1. FEELING NERVOUS, ANXIOUS, OR ON EDGE: NEARLY EVERY DAY
GAD7 TOTAL SCORE: 16
6. BECOMING EASILY ANNOYED OR IRRITABLE: NOT AT ALL
7. FEELING AFRAID AS IF SOMETHING AWFUL MIGHT HAPPEN: SEVERAL DAYS
7. FEELING AFRAID AS IF SOMETHING AWFUL MIGHT HAPPEN: SEVERAL DAYS

## 2018-10-09 ASSESSMENT — PATIENT HEALTH QUESTIONNAIRE - PHQ9
SUM OF ALL RESPONSES TO PHQ QUESTIONS 1-9: 11
SUM OF ALL RESPONSES TO PHQ QUESTIONS 1-9: 11

## 2018-10-09 NOTE — MR AVS SNAPSHOT
After Visit Summary   10/9/2018    Jeannine Carpio    MRN: [id number]           Patient Information     Date Of Birth          1950        Visit Information        Provider Department      10/9/2018 9:15 AM Haase, Susan Rachele, APRN CNP [facility]        Today's Diagnoses     Acute left-sided low back pain with left-sided sciatica    -  1    Protein-calorie malnutrition, unspecified severity (H)        Hypothyroidism, unspecified type        Tobacco dependence          Care Instructions    Higher calorie drinks I recommend - Boost or Ensure. This will help keep your weight stable.    Lung cancer screening at [facility].     I recommend an MRI of your back if the pain doesn't improve.          Follow-ups after your visit        Future tests that were ordered for you today     Open Future Orders        Priority Expected Expires Ordered    CT Chest w/o Contrast Routine  10/9/2019 10/9/2018    US Thyroid Routine  10/9/2019 10/9/2018            Who to contact     If you have questions or need follow up information about today's clinic visit or your schedule please contact [facility] directly at [phone].  Normal or non-critical lab and imaging results will be communicated to you by MyChart, letter or phone within 4 business days after the clinic has received the results. If you do not hear from us within 7 days, please contact the clinic through MyChart or phone. If you have a critical or abnormal lab result, we will notify you by phone as soon as possible.  Submit refill requests through MileIQ or call your pharmacy and they will forward the refill request to us. Please allow 3 business days for your refill to be completed.          Additional Information About Your Visit        Care EveryWhere ID     This is your Care EveryWhere ID. This could be used by other organizations to access your [place] medical records  [id number]        Your Vitals Were      Pulse Temperature Respirations Pulse Oximetry BMI (Body Mass Index)       74 97.6  F (36.4  C) (Oral) 12 99% 16.48 kg/m2        Blood Pressure from Last 3 Encounters:   10/09/18 118/60   09/11/18 110/70   02/19/18 123/76    Weight from Last 3 Encounters:   10/09/18 96 lb (43.5 kg)   09/11/18 95 lb (43.1 kg)   02/19/18 98 lb 6.4 oz (44.6 kg)              We Performed the Following     T3, Free     T4 free     TSH        Primary Care Provider Office Phone # Fax #    Mayra Richardson Haase, APRN [phone] [address] [address] [facility] [address]        Equal Access to Services     OLVIN GEORGE : Iris germaino [place], waaxda luqadaha, qaybta kaalmada adeegyaguille, fran castillo . So [facility] [phone].    ATENCIÓN: Si habla español, tiene a colindres disposición servicios gratuitos de asistencia lingüística. Llame al [phone].    We comply with applicable federal civil rights laws and [place] laws. We do not discriminate on the basis of race, color, national origin, age, disability, sex, sexual orientation, or gender identity.            Thank you!     Thank you for choosing [facility]  for your care. Our goal is always to provide you with excellent care. Hearing back from our patients is one way we can continue to improve our services. Please take a few minutes to complete the written survey that you may receive in the mail after your visit with us. Thank you!             Your Updated Medication List - Protect others around you: Learn how to safely use, store and throw away your medicines at www.disposemymeds.org.          This list is accurate as of 10/9/18  9:46 AM.  Always use your most recent med list.                   Brand Name Dispense Instructions for use Diagnosis    levothyroxine 25 MCG tablet    SYNTHROID/LEVOTHROID    90 tablet    Take 1 tablet (25 mcg) by mouth daily    Hypothyroidism, unspecified type       OMEPRAZOLE PO            vitamin D 2000 units tablet      Take 1 tablet by mouth daily.

## 2018-10-09 NOTE — PROGRESS NOTES
SUBJECTIVE:   Jeannine Carpio is a 68 year old female who presents to clinic today for the following health issues:      History of Present Illness     Hypothyroidism:     Since last visit, patient describes the following symptoms::  Anxiety, Constipation, Depression, Dry skin, Fatigue, Hair loss and Weight loss    Weight loss::  Less than 5 lbs.  -Patient reports  left neck tenderness when she turns to the right, started 10 days ago. She does not think this is throat related.  -Patient reports she has been more tired lately.   -Ongoing constipation. No weight loss since last visit. Appetite has been stable.     Low back pain:  Patient reports of low back pain that started one week ago. The pain is located where in sciatic nerve is located, radiates down her hip and down her left leg. Sometimes will have pain through her groin. No known injury. Occurred after bending over. Intermittent tingling but no numbness. Taking tylenol for the pain. Has OA of her hips and knees.     Tobacco use:  Patient has a 50year smoking history. Started at age 18 and is currently smoking about 14 cigarettes per day.     Problem list and histories reviewed & adjusted, as indicated.  Additional history: as documented  Patient Active Problem List   Diagnosis     Tobacco dependence     Arthralgia     Lumbar radiculopathy     CARDIOVASCULAR SCREENING; LDL GOAL LESS THAN 130     Vaginal atrophy     Menopause     DDD (degenerative disc disease), lumbar     Advanced directives, counseling/discussion     Social anxiety disorder     Mild major depression (H)     Fatigue     Vitamin D insufficiency     Elevated glucose     Protein-calorie malnutrition, unspecified severity (H)     Hypothyroidism, unspecified type     Past Surgical History:   Procedure Laterality Date     LEEP TX, CERVICAL      LEEP TX Cervical       Social History   Substance Use Topics     Smoking status: Current Every Day Smoker     Types: Cigarettes     Smokeless tobacco: Never  Used     Alcohol use No     Family History   Problem Relation Age of Onset     Diabetes Mother      Cerebrovascular Disease Mother      Arthritis Mother      Eye Disorder Mother      Cataracts     HEART DISEASE Mother      CHF, A-Fib     Osteoporosis Mother      Thyroid Disease Mother      Hypothyroidism     Alzheimer Disease Father      Eye Disorder Father      Cataracts     HEART DISEASE Father      Bypass     Thyroid Disease Maternal Grandmother      Thyroid Disease Brother      Hyperthyroidism     Depression Sister      Thyroid Disease Sister      Thyroid Disease Daughter      Hypothyroid         Current Outpatient Prescriptions   Medication Sig Dispense Refill     Cholecalciferol (VITAMIN D) 2000 UNIT tablet Take 1 tablet by mouth daily.       levothyroxine (SYNTHROID/LEVOTHROID) 25 MCG tablet Take 1 tablet (25 mcg) by mouth daily 90 tablet 3     OMEPRAZOLE PO        Allergies   Allergen Reactions     Ibuprofen Hives     Penicillins Hives     Tetracyclines Hives     Recent Labs   Lab Test  09/11/18   1001  05/12/18   0815   07/25/17   0853  08/18/15   0948  07/08/13   0842   04/29/11   1141   A1C  5.6   --    --    --    --    --    --   5.4   LDL   --    --    --   142*  149*  116   --    --    HDL   --    --    --   52  44*  42*   --    --    TRIG   --    --    --   150*  139  186*   --    --    ALT  22   --    --   25  20  24   < >   --    CR  0.65   --    --   0.76  0.68  0.63   < >   --    GFRESTIMATED  >90   --    --   76  88  >90   < >   --    GFRESTBLACK  >90   --    --   >90   GFR Calc    >90   GFR Calc    >90   < >   --    POTASSIUM  4.2   --    --   3.9  4.0  4.2   < >   --    TSH  2.90  4.09*   < >  5.56*  3.11  3.67   < >  2.75    < > = values in this interval not displayed.      BP Readings from Last 3 Encounters:   10/09/18 118/60   09/11/18 110/70   02/19/18 123/76    Wt Readings from Last 3 Encounters:   10/09/18 96 lb (43.5 kg)   09/11/18 95 lb (43.1 kg)    02/19/18 98 lb 6.4 oz (44.6 kg)          ROS:  Constitutional, HEENT, cardiovascular, pulmonary, GI, musculoskeletal, neuro,  endocrine and psych systems are negative, except as otherwise noted.    This document serves as a record of the services and decisions personally performed and made by Susan Haase, APRN CNP. It was created on her behalf by Caterina Wallis, a trained medical scribe.  The creation of this document is based on the scribe's personal observations and the provider's statements to the medical scribe.  Caterina Wallis, October 9, 2018 9:33 AM    OBJECTIVE:     /60 (BP Location: Left arm, Patient Position: Chair, Cuff Size: Adult Regular)  Pulse 74  Temp 97.6  F (36.4  C) (Oral)  Resp 12  Wt 96 lb (43.5 kg)  SpO2 99%  BMI 16.48 kg/m2  Body mass index is 16.48 kg/(m^2).  GENERAL: healthy, alert and no distress  HENT: ear canals and TM's normal, nose and mouth without ulcers or lesions  NECK: no adenopathy, no asymmetry, masses, or scars and thyroid normal to palpation  RESP: lungs clear to auscultation - no rales, rhonchi or wheezes  CV: regular rate and rhythm, normal S1 S2, no S3 or S4, no murmur, click or rub, no peripheral edema and peripheral pulses strong  MS: no gross musculoskeletal defects noted, no edema. Left para lumbar tenderness upon palpation, Limited flexion due to pain, walking with normal gait.   NEURO: BLE with normal strength, reflexes and tone, mentation intact and speech normal.Able to walk on toes and heels.   PSYCH: mentation appears normal, affect normal/bright      ASSESSMENT/PLAN:   Jeannine was seen today for recheck medication.    Diagnoses and all orders for this visit:  Hypothyroidism, unspecified type: Updating labs today.   -     TSH  -     T3, Free  -     T4 free  -     US Thyroid; Future    Acute left-sided low back pain with left-sided sciatica: MRI if not improving within the next week. Discussed application of ice or heat for 15-20 min tid, gentle stretching,  tylenol (max of 3,000 mg per day)    Protein-calorie malnutrition, unspecified severity (H): recommended higher calorie drinks - Boost or Ensure - to keep weight stable. Will also obtain CT to further evaluate for lung cancer with recent weight loss.   -     CT Chest w/o Contrast; Future    Tobacco dependence: 50 year pack history, is not interested in cessation.  -     CT Chest w/o Contrast; Future    Follow-up in 3 months, sooner if needed.     The information in this document, created by the medical scribe for me, accurately reflects the services I personally performed and the decisions made by me. I have reviewed and approved this document for accuracy prior to leaving the patient care area.    Susan Haase, APRN [facility]

## 2018-10-09 NOTE — LETTER
October 10, 2018      Jeannine COUGHLIN Shirin  [address]  [place] [address]        Hi Jeannine,     Your TSH (thyroid level) is normal at 3.41.  Continue on levothyroxine at 25 mcg every day.     Resulted Orders   TSH   Result Value Ref Range    TSH 3.41 0.40 - 4.00 mU/L   T3, Free   Result Value Ref Range    Free T3 2.3 2.3 - 4.2 pg/mL   T4 free   Result Value Ref Range    T4 Free 1.14 0.76 - 1.46 ng/dL     If you have any questions or concerns, please call the clinic at the number listed above.     Sincerely,      Susan Haase, APRN CNP

## 2018-10-10 ASSESSMENT — PATIENT HEALTH QUESTIONNAIRE - PHQ9: SUM OF ALL RESPONSES TO PHQ QUESTIONS 1-9: 11

## 2018-10-10 ASSESSMENT — ANXIETY QUESTIONNAIRES: GAD7 TOTAL SCORE: 16

## 2018-10-12 ENCOUNTER — HOSPITAL ENCOUNTER (OUTPATIENT)
Dept: CT IMAGING | Facility: CLINIC | Age: 68
End: 2018-10-12
Attending: NURSE PRACTITIONER
Payer: MEDICARE

## 2018-10-12 ENCOUNTER — HOSPITAL ENCOUNTER (OUTPATIENT)
Dept: ULTRASOUND IMAGING | Facility: CLINIC | Age: 68
Discharge: HOME | End: 2018-10-12
Attending: NURSE PRACTITIONER | Admitting: NURSE PRACTITIONER
Payer: MEDICARE

## 2018-10-12 DIAGNOSIS — E46 PROTEIN-CALORIE MALNUTRITION, UNSPECIFIED SEVERITY (H): ICD-10-CM

## 2018-10-12 DIAGNOSIS — F17.200 TOBACCO DEPENDENCE: ICD-10-CM

## 2018-10-12 DIAGNOSIS — E03.9 HYPOTHYROIDISM, UNSPECIFIED TYPE: ICD-10-CM

## 2018-10-12 PROCEDURE — 71250 CT THORAX DX C-: CPT

## 2018-10-12 PROCEDURE — 76536 US EXAM OF HEAD AND NECK: CPT

## 2018-10-19 ENCOUNTER — TELEPHONE (OUTPATIENT)
Dept: FAMILY MEDICINE | Facility: CLINIC | Age: 68
End: 2018-10-19

## 2018-10-19 PROBLEM — R91.1 LUNG NODULE, SOLITARY: Status: ACTIVE | Noted: 2018-10-19

## 2018-10-19 NOTE — TELEPHONE ENCOUNTER
Patient calling and wondering why she has not heard anything on CT results.  States got letter regarding thyroid U/S but nothing about chest CT.  Please advise.  Call her back at above #.  Patsy Ellsworth RN

## 2018-10-20 NOTE — TELEPHONE ENCOUNTER
CT Chest  IMPRESSION:   1. Emphysema.  2. Mild ill-defined groundglass opacity in the left upper lobe is  nonspecific but could be infectious in etiology. Please clinically  correlate.  3. Indeterminate 0.4 cm left lower lobe pulmonary nodule. A followup  CT in 6 months is recommended.      LM to CB    Kenia Jordan RN, BS  Clinical Nurse Triage.

## 2018-10-20 NOTE — TELEPHONE ENCOUNTER
Please call Jeannine and let her know the following;  he CT of your chest shows that you have a small nodule in the left lower lobe of your lung, it is recommended that you have another scan completed in April, 2019.  It also shows that you have emphysema, it would be good if you would set up an appt with me and we can further check your lung function.    Sincerely,    Susan Haase, CNP

## 2018-10-26 ENCOUNTER — OFFICE VISIT (OUTPATIENT)
Dept: FAMILY MEDICINE | Facility: CLINIC | Age: 68
End: 2018-10-26
Payer: MEDICARE

## 2018-10-26 VITALS
TEMPERATURE: 97.7 F | SYSTOLIC BLOOD PRESSURE: 130 MMHG | RESPIRATION RATE: 14 BRPM | DIASTOLIC BLOOD PRESSURE: 60 MMHG | WEIGHT: 100 LBS | HEART RATE: 84 BPM | OXYGEN SATURATION: 98 % | BODY MASS INDEX: 17.16 KG/M2

## 2018-10-26 DIAGNOSIS — F17.210 CIGARETTE NICOTINE DEPENDENCE WITHOUT COMPLICATION: ICD-10-CM

## 2018-10-26 DIAGNOSIS — E78.00 HYPERCHOLESTEROLEMIA: ICD-10-CM

## 2018-10-26 DIAGNOSIS — R22.1 LOCALIZED SWELLING, MASS OR LUMP OF NECK: ICD-10-CM

## 2018-10-26 DIAGNOSIS — Z13.6 CARDIOVASCULAR SCREENING; LDL GOAL LESS THAN 130: ICD-10-CM

## 2018-10-26 DIAGNOSIS — J43.9 PULMONARY EMPHYSEMA, UNSPECIFIED EMPHYSEMA TYPE (H): Primary | ICD-10-CM

## 2018-10-26 DIAGNOSIS — E46 PROTEIN-CALORIE MALNUTRITION, UNSPECIFIED SEVERITY (H): ICD-10-CM

## 2018-10-26 DIAGNOSIS — R91.1 LUNG NODULE, SOLITARY: ICD-10-CM

## 2018-10-26 LAB
FEF 25/75: NORMAL
FEV-1: NORMAL
FEV1/FVC: NORMAL
FVC: NORMAL

## 2018-10-26 PROCEDURE — 94010 BREATHING CAPACITY TEST: CPT | Performed by: NURSE PRACTITIONER

## 2018-10-26 PROCEDURE — 99214 OFFICE O/P EST MOD 30 MIN: CPT | Mod: 25 | Performed by: NURSE PRACTITIONER

## 2018-10-26 RX ORDER — POLYETHYLENE GLYCOL 3350 17 G
2 POWDER IN PACKET (EA) ORAL
Qty: 360 LOZENGE | Refills: 1 | Status: SHIPPED | OUTPATIENT
Start: 2018-10-26 | End: 2020-10-12

## 2018-10-26 NOTE — PROGRESS NOTES
"  SUBJECTIVE:   Jeannine Carpio is a 68 year old female who presents to clinic today for the following health issues:    Results: Patient has a 50 year smoking history, started at age 18 and currently smokes \"a little more\" than 0.5 pack cigarettes per day. She reports of having a cough everyday, but no wheezing. Patient is accompanied with daughter today to discuss CT results completed on 10/12/18-  IMPRESSION:   1. Emphysema.  2. Mild ill-defined ground glass opacity in the left upper lobe is  nonspecific but could be infectious in etiology. Please clinically  correlate.  3. Indeterminate 0.4 cm left lower lobe pulmonary nodule. A followup  CT in 6 months is recommended. \"    Weight loss:  Has been drinking ensure daily,weight has increased.       Problem list and histories reviewed & adjusted, as indicated.  Additional history: as documented  Patient Active Problem List   Diagnosis     Tobacco dependence     Arthralgia     Lumbar radiculopathy     CARDIOVASCULAR SCREENING; LDL GOAL LESS THAN 130     Vaginal atrophy     Menopause     DDD (degenerative disc disease), lumbar     Advanced directives, counseling/discussion     Social anxiety disorder     Mild major depression (H)     Fatigue     Vitamin D insufficiency     Elevated glucose     Protein-calorie malnutrition, unspecified severity (H)     Hypothyroidism, unspecified type     Lung nodule, solitary     Past Surgical History:   Procedure Laterality Date     LEEP TX, CERVICAL      LEEP TX Cervical       Social History   Substance Use Topics     Smoking status: Current Every Day Smoker     Types: Cigarettes     Smokeless tobacco: Never Used     Alcohol use No     Family History   Problem Relation Age of Onset     Diabetes Mother      Cerebrovascular Disease Mother      Arthritis Mother      Eye Disorder Mother      Cataracts     HEART DISEASE Mother      CHF, A-Fib     Osteoporosis Mother      Thyroid Disease Mother      Hypothyroidism     Alzheimer Disease " Father      Eye Disorder Father      Cataracts     HEART DISEASE Father      Bypass     Thyroid Disease Maternal Grandmother      Thyroid Disease Brother      Hyperthyroidism     Depression Sister      Thyroid Disease Sister      Thyroid Disease Daughter      Hypothyroid         Current Outpatient Prescriptions   Medication Sig Dispense Refill     Cholecalciferol (VITAMIN D) 2000 UNIT tablet Take 1 tablet by mouth daily.       levothyroxine (SYNTHROID/LEVOTHROID) 25 MCG tablet Take 1 tablet (25 mcg) by mouth daily 90 tablet 3     OMEPRAZOLE PO        Allergies   Allergen Reactions     Ibuprofen Hives     Penicillins Hives     Tetracyclines Hives     Recent Labs   Lab Test  10/09/18   0951  09/11/18   1001   07/25/17   0853  08/18/15   0948  07/08/13   0842   04/29/11   1141   A1C   --   5.6   --    --    --    --    --   5.4   LDL   --    --    --   142*  149*  116   --    --    HDL   --    --    --   52  44*  42*   --    --    TRIG   --    --    --   150*  139  186*   --    --    ALT   --   22   --   25  20  24   < >   --    CR   --   0.65   --   0.76  0.68  0.63   < >   --    GFRESTIMATED   --   >90   --   76  88  >90   < >   --    GFRESTBLACK   --   >90   --   >90   GFR Calc    >90   GFR Calc    >90   < >   --    POTASSIUM   --   4.2   --   3.9  4.0  4.2   < >   --    TSH  3.41  2.90   < >  5.56*  3.11  3.67   < >  2.75    < > = values in this interval not displayed.      BP Readings from Last 3 Encounters:   10/26/18 130/60   10/09/18 118/60   09/11/18 110/70    Wt Readings from Last 3 Encounters:   10/26/18 100 lb (45.4 kg)   10/09/18 96 lb (43.5 kg)   09/11/18 95 lb (43.1 kg)         Reviewed and updated as needed this visit by clinical staff  Tobacco  Allergies  Med Hx  Surg Hx  Fam Hx  Soc Hx      Reviewed and updated as needed this visit by Provider       ROS:  Constitutional, cardiovascular, pulmonary, endocrine and psych systems are negative, except as otherwise  noted.    This document serves as a record of the services and decisions personally performed and made by Susan Haase, APRN CNP. It was created on her behalf by Caterina Wallis, a trained medical scribe.  The creation of this document is based on the scribe's personal observations and the provider's statements to the medical scribe.  Caterina Wallis, October 26, 2018 2:23 PM    OBJECTIVE:   /60 (BP Location: Left arm, Patient Position: Chair, Cuff Size: Adult Regular)  Pulse 84  Temp 97.7  F (36.5  C) (Oral)  Resp 14  Wt 100 lb (45.4 kg)  SpO2 98%  BMI 17.16 kg/m2  Body mass index is 17.16 kg/(m^2).  GENERAL: healthy, alert and no distress  HENT: ear canals and TM's normal, nose and mouth without ulcers or lesions  NECK: no adenopathy, no asymmetry, masses, or scars and thyroid normal to palpation  RESP: lungs clear to auscultation - no rales, rhonchi or wheezes  CV: regular rate and rhythm, normal S1 S2, no S3 or S4, no murmur, click or rub, no peripheral edema and peripheral pulses strong    ASSESSMENT/PLAN:   Jeannine was seen today for results.    Diagnoses and all orders for this visit:    Pulmonary emphysema, unspecified emphysema type (H):  Noted on CT scan, 50 year pack history.  Spirometry with FEV1/FVC of 81%. Will start on daily spiriva.   -     Spirometry, Breathing Capacity: Normal Order, Clinic Performed  -     tiotropium (SPIRIVA RESPIMAT) 2.5 MCG/ACT inhalation aerosol; Inhale 2 puffs into the lungs daily    Lung nodule, solitary:  Recheck in 6 months, order placed.   -     CT Chest w/o Contrast; Future    Calcification of artery: will obtain US of carotid arteries, also check cholesterol:   -     US Carotid arteries, Future    Cigarette nicotine dependence without complication: is contemplating quitting.   -     nicotine polacrilex (CVS NICOTINE) 2 MG lozenge; Place 1 lozenge (2 mg) inside cheek every hour as needed for smoking cessation    Protein-calorie malnutrition, unspecified severity (H):  continue daily ensure, has had a 4lb weight gain in 4 weeks     CARDIOVASCULAR SCREENING; LDL GOAL LESS THAN 130  -     Comprehensive metabolic panel; Future  -     Lipid panel reflex to direct LDL Fasting; Future      FUTURE APPOINTMENTS:       - Follow-up visit in 3 months, sooner as needed.     The information in this document, created by the medical scribe for me, accurately reflects the services I personally performed and the decisions made by me. I have reviewed and approved this document for accuracy prior to leaving the patient care area.    Susan Haase, APRN [facility]

## 2018-10-26 NOTE — MR AVS SNAPSHOT
After Visit Summary   10/26/2018    Jeannine Carpio    MRN: [id number]           Patient Information     Date Of Birth          1950        Visit Information        Provider Department      10/26/2018 2:15 PM Haase, Susan Rachele, APRN CNP [facility]        Today's Diagnoses     Pulmonary emphysema, unspecified emphysema type (H)    -  1    Lung nodule, solitary        Protein-calorie malnutrition, unspecified severity (H)        CARDIOVASCULAR SCREENING; LDL GOAL LESS THAN 130        Cigarette nicotine dependence without complication        Calcification of artery           Follow-ups after your visit        Future tests that were ordered for you today     Open Future Orders        Priority Expected Expires Ordered    US Aortic Imaging Routine  10/26/2019 10/26/2018    CT Chest w/o Contrast Routine  10/26/2019 10/26/2018    Comprehensive metabolic panel Routine  11/26/2018 10/26/2018    Lipid panel reflex to direct LDL Fasting Routine  11/26/2018 10/26/2018            Who to contact     If you have questions or need follow up information about today's clinic visit or your schedule please contact [facility] directly at [phone].  Normal or non-critical lab and imaging results will be communicated to you by MyChart, letter or phone within 4 business days after the clinic has received the results. If you do not hear from us within 7 days, please contact the clinic through MyChart or phone. If you have a critical or abnormal lab result, we will notify you by phone as soon as possible.  Submit refill requests through [employer] or call your pharmacy and they will forward the refill request to us. Please allow 3 business days for your refill to be completed.          Additional Information About Your Visit        Care EveryWhere ID     This is your Care EveryWhere ID. This could be used by other organizations to access your [place] medical records  [id number]         Your Vitals Were     Pulse Temperature Respirations Pulse Oximetry BMI (Body Mass Index)       84 97.7  F (36.5  C) (Oral) 14 98% 17.16 kg/m2        Blood Pressure from Last 3 Encounters:   10/26/18 130/60   10/09/18 118/60   09/11/18 110/70    Weight from Last 3 Encounters:   10/26/18 100 lb (45.4 kg)   10/09/18 96 lb (43.5 kg)   09/11/18 95 lb (43.1 kg)              We Performed the Following     Spirometry, Breathing Capacity: Normal Order, Clinic Performed          Today's Medication Changes          These changes are accurate as of 10/26/18  2:50 PM.  If you have any questions, ask your nurse or doctor.               Start taking these medicines.        Dose/Directions    nicotine polacrilex 2 MG lozenge   Commonly known as:  [employer] NICOTINE   Used for:  Cigarette nicotine dependence without complication   Started by:  Haase, Susan Rachele, APRN CNP        Dose:  2 mg   Place 1 lozenge (2 mg) inside cheek every hour as needed for smoking cessation   Quantity:  360 lozenge   Refills:  1            Where to get your medicines      These medications [facility] sent to [id number] - [place], [place] - [address]  [address] [facility] [address]    Hours:  9Am-9Pm (M-F) 9Am-6Pm (S&S) Phone:  [phone]     nicotine polacrilex 2 MG lozenge                Primary Care Provider Office Phone # Fax #    Susan Rachele Haase, APRN [phone] [address] [facility] [address]        Equal Access to Services     LAKESHA GEORGE : Hadii frida germaino Sokoko, waaxda luqadaha, qaybta kaalmada adeegmera, waxay idiin hayaan adeeg kharash la'aan . So [facility] [phone].    ATENCIÓN: Si habla español, tiene a colindres disposición servicios gratuitos de asistencia lingüística. Llame al [phone].    We comply with applicable federal civil rights laws and [place] laws. We do not discriminate on the basis of race, color, national origin, age, disability, sex, sexual orientation, or gender  identity.            Thank you!     Thank you for choosing [facility]  for your care. Our goal is always to provide you with excellent care. Hearing back from our patients is one way we can continue to improve our services. Please take a few minutes to complete the written survey that you may receive in the mail after your visit with us. Thank you!             Your Updated Medication List - Protect others around you: Learn how to safely use, store and throw away your medicines at www.disposemymeds.org.          This list is accurate as of 10/26/18  2:50 PM.  Always use your most recent med list.                   Brand Name Dispense Instructions for use Diagnosis    levothyroxine 25 MCG tablet    SYNTHROID/LEVOTHROID    90 tablet    Take 1 tablet (25 mcg) by mouth daily    Hypothyroidism, unspecified type       nicotine polacrilex 2 MG lozenge    CVS NICOTINE    360 lozenge    Place 1 lozenge (2 mg) inside cheek every hour as needed for smoking cessation    Cigarette nicotine dependence without complication       OMEPRAZOLE PO           vitamin D 2000 units tablet      Take 1 tablet by mouth daily.

## 2018-10-30 ENCOUNTER — TELEPHONE (OUTPATIENT)
Dept: FAMILY MEDICINE | Facility: CLINIC | Age: 68
End: 2018-10-30

## 2018-10-30 DIAGNOSIS — J43.9 PULMONARY EMPHYSEMA, UNSPECIFIED EMPHYSEMA TYPE (H): Primary | ICD-10-CM

## 2018-10-30 RX ORDER — BUDESONIDE AND FORMOTEROL FUMARATE DIHYDRATE 80; 4.5 UG/1; UG/1
2 AEROSOL RESPIRATORY (INHALATION) 2 TIMES DAILY
Qty: 1 INHALER | Refills: 1 | Status: SHIPPED | OUTPATIENT
Start: 2018-10-30 | End: 2019-02-04

## 2018-10-30 NOTE — TELEPHONE ENCOUNTER
Reason for call:  Medication   If this is a refill request, has the caller requested the refill from the pharmacy already? No  Will the patient be using a [facility]? No  Name of the pharmacy and phone number for the current request: Pt requesting a paper copy    Name of the medication requested: Symbicort    Other request: Pt was given an Rx for Spiriva, but the cost was $440.00 and pt pays out of pocket.  Pt refused the prescription and would like to get the prescription for Symbicort instead because it is suppose to be cheaper.    Phone number to reach patient:  Cell number on file:    Telephone Information:   Mobile [phone]       Best Time:  any    Can we leave a detailed message on this number?  YES

## 2018-10-31 NOTE — TELEPHONE ENCOUNTER
Tried number several times, number not active, pharmacy should inform pt, will close and wait for pt to call back if needed, confirm cell number  Vandana Russell RN, BSN  Message handled by Nurse Triage.

## 2018-11-10 DIAGNOSIS — Z13.6 CARDIOVASCULAR SCREENING; LDL GOAL LESS THAN 130: ICD-10-CM

## 2018-11-10 LAB
ALBUMIN SERPL-MCNC: 3.7 G/DL (ref 3.4–5)
ALP SERPL-CCNC: 70 U/L (ref 40–150)
ALT SERPL W P-5'-P-CCNC: 25 U/L (ref 0–50)
ANION GAP SERPL CALCULATED.3IONS-SCNC: 9 MMOL/L (ref 3–14)
AST SERPL W P-5'-P-CCNC: 17 U/L (ref 0–45)
BILIRUB SERPL-MCNC: 0.8 MG/DL (ref 0.2–1.3)
BUN SERPL-MCNC: 12 MG/DL (ref 7–30)
CALCIUM SERPL-MCNC: 8.9 MG/DL (ref 8.5–10.1)
CHLORIDE SERPL-SCNC: 104 MMOL/L (ref 94–109)
CHOLEST SERPL-MCNC: 216 MG/DL
CO2 SERPL-SCNC: 27 MMOL/L (ref 20–32)
CREAT SERPL-MCNC: 0.77 MG/DL (ref 0.52–1.04)
GFR SERPL CREATININE-BSD FRML MDRD: 75 ML/MIN/1.7M2
GLUCOSE SERPL-MCNC: 95 MG/DL (ref 70–99)
HDLC SERPL-MCNC: 51 MG/DL
LDLC SERPL CALC-MCNC: 131 MG/DL
NONHDLC SERPL-MCNC: 165 MG/DL
POTASSIUM SERPL-SCNC: 3.7 MMOL/L (ref 3.4–5.3)
PROT SERPL-MCNC: 7.3 G/DL (ref 6.8–8.8)
SODIUM SERPL-SCNC: 140 MMOL/L (ref 133–144)
TRIGL SERPL-MCNC: 172 MG/DL

## 2018-11-10 PROCEDURE — 36415 COLL VENOUS BLD VENIPUNCTURE: CPT | Performed by: NURSE PRACTITIONER

## 2018-11-10 PROCEDURE — 80053 COMPREHEN METABOLIC PANEL: CPT | Performed by: NURSE PRACTITIONER

## 2018-11-10 PROCEDURE — 80061 LIPID PANEL: CPT | Performed by: NURSE PRACTITIONER

## 2018-11-16 ENCOUNTER — HOSPITAL ENCOUNTER (OUTPATIENT)
Dept: ULTRASOUND IMAGING | Facility: CLINIC | Age: 68
Discharge: HOME | End: 2018-11-16
Attending: NURSE PRACTITIONER | Admitting: NURSE PRACTITIONER
Payer: MEDICARE

## 2018-11-16 DIAGNOSIS — R22.1 LOCALIZED SWELLING, MASS OR LUMP OF NECK: ICD-10-CM

## 2018-11-16 DIAGNOSIS — I70.90 CALCIFICATION OF ARTERY: ICD-10-CM

## 2018-11-16 PROCEDURE — 93880 EXTRACRANIAL BILAT STUDY: CPT

## 2019-01-18 ENCOUNTER — TELEPHONE (OUTPATIENT)
Dept: FAMILY MEDICINE | Facility: CLINIC | Age: 69
End: 2019-01-18

## 2019-01-18 ASSESSMENT — PATIENT HEALTH QUESTIONNAIRE - PHQ9: SUM OF ALL RESPONSES TO PHQ QUESTIONS 1-9: 16

## 2019-01-18 NOTE — TELEPHONE ENCOUNTER
Panel Management Review      Patient has the following on her problem list:     Depression / Dysthymia review    Measure:  Needs PHQ-9 score of 4 or less during index window.  Administer PHQ-9 and if score is 5 or more, send encounter to provider for next steps.    5 - 7 month window range: 1/11/2019 - 5/11/2019    PHQ-9 SCORE 2/19/2018 9/11/2018 10/9/2018   PHQ-9 Total Score - - -   PHQ-9 Total Score MyChart - 10 (Moderate depression) 11 (Moderate depression)   PHQ-9 Total Score 9 10 11       If PHQ-9 recheck is 5 or more, route to provider for next steps.    Patient is due for:  PHQ9      Composite cancer screening  Chart review shows that this patient is due/due soon for the following None  Summary:    Patient is due/failing the following:   PHQ9    Action needed:   Patient needs to do PHQ9.    Type of outreach:    routed to panel pool for outreach    Questions for provider review:    None                                                                                                                                    Jennifer Castro       Chart routed to Care Team .

## 2019-01-20 NOTE — TELEPHONE ENCOUNTER
Can you ask Jeannine to return for a clinic visit, I would like to check her lung function again.  Thanks,  Susan Haase, CNP

## 2019-02-04 ENCOUNTER — OFFICE VISIT (OUTPATIENT)
Dept: FAMILY MEDICINE | Facility: CLINIC | Age: 69
End: 2019-02-04
Payer: MEDICARE

## 2019-02-04 VITALS
RESPIRATION RATE: 16 BRPM | OXYGEN SATURATION: 98 % | DIASTOLIC BLOOD PRESSURE: 42 MMHG | SYSTOLIC BLOOD PRESSURE: 110 MMHG | HEART RATE: 96 BPM | TEMPERATURE: 98.4 F | WEIGHT: 100 LBS | BODY MASS INDEX: 17.16 KG/M2

## 2019-02-04 DIAGNOSIS — J06.9 VIRAL URI WITH COUGH: Primary | ICD-10-CM

## 2019-02-04 LAB
FLUAV+FLUBV AG SPEC QL: NEGATIVE
FLUAV+FLUBV AG SPEC QL: NEGATIVE
SPECIMEN SOURCE: NORMAL

## 2019-02-04 PROCEDURE — 99213 OFFICE O/P EST LOW 20 MIN: CPT | Performed by: PHYSICIAN ASSISTANT

## 2019-02-04 PROCEDURE — 87804 INFLUENZA ASSAY W/OPTIC: CPT | Performed by: PHYSICIAN ASSISTANT

## 2019-02-04 NOTE — PATIENT INSTRUCTIONS
Upper respiratory infections are usually caused by viruses and, sometimes certain bacteria.  Antibiotics don't help the vast majority of people recover any quicker even when caused by a bacteria.  The body will fight this infection but it needs to be treated well in order to help heal itself.  Rest as needed.  It is ok to reduce food intake if appetite is poor but it is quite important to maintain/increase fluid intake.    For cough, dextromethorphan/guaifenesin combinations help loosen secretions and suppress cough safely without significant risk of sedation. Often 2 puffs four times daily of an albuterol inhaler will help with bronchitis.  This is a prescription medicine.    For nasal congestion and sinus pressure, pseudoephedrine (Sudafed) or phenylephrine is often helpful but it can cause elevations in blood pressure and insomnia.  Short courses of a nasal decongestant spray (Afrin or Neosinephrine) can be appropriate but their use should be restricted to 3 days due to the high risk of nasal addiction.    For pain and fevers, acetaminophen (Tylenol) is most appropriate.  Ibuprofen (Advil) or naproxen (Aleve) are useful too and last longer but they can cause elevation of blood pressure or stomach problems.    Antihistamines (Benadryl, Dimetapp, etc.) cause sedation, confusion, bowel and urinary abnormalities and are of little use for infectious causes of cough and nasal congestion.  Their use should be reserved for allergic symptoms.      Follow-up if not improving in 1 week or sooner if worsening.

## 2019-03-22 ENCOUNTER — TELEPHONE (OUTPATIENT)
Dept: FAMILY MEDICINE | Facility: CLINIC | Age: 69
End: 2019-03-22

## 2019-03-22 NOTE — TELEPHONE ENCOUNTER
Panel Management Review      Patient has the following on her problem list:     Depression / Dysthymia review    Measure:  Needs PHQ-9 score of 4 or less during index window.  Administer PHQ-9 and if score is 5 or more, send encounter to provider for next steps.    5 - 7 month window range: 1/11/19-5/11/19    PHQ-9 SCORE 9/11/2018 10/9/2018 1/18/2019   PHQ-9 Total Score - - -   PHQ-9 Total Score MyChart 10 (Moderate depression) 11 (Moderate depression) -   PHQ-9 Total Score 10 11 16       If PHQ-9 recheck is 5 or more, route to provider for next steps.    Patient is due for:  PHQ9, physical      Composite cancer screening  Chart review shows that this patient is due/due soon for the following None  Summary:    Patient is due/failing the following:   PHQ9 and PHYSICAL and appointment with Mayra to check lung function     Action needed:   Patient needs office visit for   phsycial. and Patient needs to do PHQ9.    Chart routed to Care Team .

## 2019-03-22 NOTE — LETTER
[facility]  [address] [address]  [phone]  April 1, 2019    Jeannine Carpio  [address] [address] [address]    Dear Jeannine,    I care about your health and have reviewed your health plan. I have reviewed your medical conditions, medication list, and lab results and am making recommendations based on this review, to better manage your health.    You are in particular need of attention regarding:  -Depression  -Wellness (Physical) Visit   -Recheck lung function  I am recommending that you:  {recommendations:-schedule a WELLNESS (Physical) APPOINTMENT with me.   I will check fasting labs the same day - nothing to eat except water and meds for 8-10 hours prior.    Here is a list of Health Maintenance topics that are due now or due soon:  Health Maintenance Due   Topic Date Due     COPD ACTION PLAN Q1 YR  1950     HEPATITIS C SCREENING  05/04/1970     ZOSTER IMMUNIZATION (1 of 2) 05/04/2002     ADVANCE DIRECTIVE PLANNING Q5 YRS  02/17/2017     DEXA SCAN SCREENING (SYSTEM ASSIGNED)  05/04/2017     MEDICARE ANNUAL WELLNESS VISIT  07/25/2018     INFLUENZA VACCINE (1) 09/01/2018       Please call us at [phone] (or use smartclip) to address the above recommendations.     Thank you for trusting [facility] and we appreciate the opportunity to serve you.  We look forward to supporting your healthcare needs in the future.    Healthy Regards,    Susan Haase CNP

## 2019-04-01 NOTE — TELEPHONE ENCOUNTER
"Type of outreach:  Phone, spoke to patient.  attempted call 2 x.  Got the message \"this call cannot be completed as dialed\".  Letter sent. Shari Schoenberger, [facility]        "

## 2019-04-05 ENCOUNTER — OFFICE VISIT (OUTPATIENT)
Dept: FAMILY MEDICINE | Facility: CLINIC | Age: 69
End: 2019-04-05
Payer: MEDICARE

## 2019-04-05 VITALS
SYSTOLIC BLOOD PRESSURE: 120 MMHG | DIASTOLIC BLOOD PRESSURE: 66 MMHG | HEART RATE: 87 BPM | BODY MASS INDEX: 17.16 KG/M2 | RESPIRATION RATE: 16 BRPM | WEIGHT: 100 LBS | TEMPERATURE: 98.1 F | OXYGEN SATURATION: 100 %

## 2019-04-05 DIAGNOSIS — E46 PROTEIN-CALORIE MALNUTRITION, UNSPECIFIED SEVERITY (H): ICD-10-CM

## 2019-04-05 DIAGNOSIS — F32.0 MILD MAJOR DEPRESSION (H): Primary | ICD-10-CM

## 2019-04-05 DIAGNOSIS — J43.9 PULMONARY EMPHYSEMA, UNSPECIFIED EMPHYSEMA TYPE (H): ICD-10-CM

## 2019-04-05 DIAGNOSIS — F17.200 TOBACCO DEPENDENCE: ICD-10-CM

## 2019-04-05 PROCEDURE — 99214 OFFICE O/P EST MOD 30 MIN: CPT | Performed by: PHYSICIAN ASSISTANT

## 2019-04-05 RX ORDER — ALBUTEROL SULFATE 90 UG/1
2 AEROSOL, METERED RESPIRATORY (INHALATION) EVERY 6 HOURS
Qty: 8.5 G | Refills: 0 | Status: SHIPPED | OUTPATIENT
Start: 2019-04-05 | End: 2020-11-10

## 2019-04-05 RX ORDER — BUPROPION HYDROCHLORIDE 150 MG/1
TABLET, FILM COATED, EXTENDED RELEASE ORAL
Qty: 180 TABLET | Refills: 1 | Status: SHIPPED | OUTPATIENT
Start: 2019-04-05 | End: 2019-08-23

## 2019-04-05 ASSESSMENT — ANXIETY QUESTIONNAIRES
6. BECOMING EASILY ANNOYED OR IRRITABLE: SEVERAL DAYS
4. TROUBLE RELAXING: MORE THAN HALF THE DAYS
5. BEING SO RESTLESS THAT IT IS HARD TO SIT STILL: NEARLY EVERY DAY
7. FEELING AFRAID AS IF SOMETHING AWFUL MIGHT HAPPEN: SEVERAL DAYS
3. WORRYING TOO MUCH ABOUT DIFFERENT THINGS: NEARLY EVERY DAY
2. NOT BEING ABLE TO STOP OR CONTROL WORRYING: NEARLY EVERY DAY
GAD7 TOTAL SCORE: 16
GAD7 TOTAL SCORE: 16
7. FEELING AFRAID AS IF SOMETHING AWFUL MIGHT HAPPEN: SEVERAL DAYS
GAD7 TOTAL SCORE: 16
1. FEELING NERVOUS, ANXIOUS, OR ON EDGE: NEARLY EVERY DAY

## 2019-04-05 ASSESSMENT — PATIENT HEALTH QUESTIONNAIRE - PHQ9
SUM OF ALL RESPONSES TO PHQ QUESTIONS 1-9: 14
SUM OF ALL RESPONSES TO PHQ QUESTIONS 1-9: 14
10. IF YOU CHECKED OFF ANY PROBLEMS, HOW DIFFICULT HAVE THESE PROBLEMS MADE IT FOR YOU TO DO YOUR WORK, TAKE CARE OF THINGS AT HOME, OR GET ALONG WITH OTHER PEOPLE: SOMEWHAT DIFFICULT

## 2019-04-05 NOTE — PROGRESS NOTES
SUBJECTIVE:   Jeannine Carpio is a 68 year old female who presents to clinic today for the following health issues:            Depression Followup    Status since last visit: unchanged     See PHQ-9 for current symptoms.  Other associated symptoms: None    Complicating factors:   Significant life event:  Yes-   passed away ~1.5 years ago   Current substance abuse:  None  Anxiety or Panic symptoms:  No    PHQ 10/9/2018 1/18/2019 4/5/2019   PHQ-9 Total Score 11 16 14   Q9: Thoughts of better off dead/self-harm past 2 weeks Not at all Not at all Not at all     PHQ-9  English  PHQ-9   Any Language  Suicide Assessment Five-step Evaluation and Treatment (SAFE-T)  COPD Follow-Up    Symptoms are currently: stable    Current fatigue or dyspnea with ambulation: none    Shortness of breath: none     Increased or change in Cough/Sputum: No    Fever(s): No    Baseline ambulation without stopping to rest:  >10 blocks blocks. Able to walk up >5 flights of stairs without stopping to rest.    Any ER/UC or hospital admissions since your last visit? No     History   Smoking Status     Current Every Day Smoker     Packs/day: 0.50     Types: Cigarettes   Smokeless Tobacco     Never Used     No results found for: FEV1, BBZ1EYR  Hypothyroidism Follow-up      Since last visit, patient describes the following symptoms: Weight stable, no hair loss, no skin changes, no constipation, no loose stools    However states is struggling to gain weight. States drinking 1 ensure daily with normal meals. Taking in 1800 calories daily.     TSH   Date Value Ref Range Status   10/09/2018 3.41 0.40 - 4.00 mU/L Final         Problem list and histories reviewed & adjusted, as indicated.  Additional history: as documented    Patient Active Problem List   Diagnosis     Tobacco dependence     Arthralgia     Lumbar radiculopathy     CARDIOVASCULAR SCREENING; LDL GOAL LESS THAN 130     Vaginal atrophy     Menopause     DDD (degenerative disc disease),  lumbar     Advanced directives, counseling/discussion     Social anxiety disorder     Mild major depression (H)     Fatigue     Vitamin D insufficiency     Elevated glucose     Protein-calorie malnutrition, unspecified severity (H)     Hypothyroidism, unspecified type     Lung nodule, solitary     Pulmonary emphysema, unspecified emphysema type (H)     Past Surgical History:   Procedure Laterality Date     LEEP TX, CERVICAL      LEEP TX Cervical       Social History     Tobacco Use     Smoking status: Current Every Day Smoker     Packs/day: 0.50     Types: Cigarettes     Smokeless tobacco: Never Used   Substance Use Topics     Alcohol use: No     Family History   Problem Relation Age of Onset     Diabetes Mother      Cerebrovascular Disease Mother      Arthritis Mother      Eye Disorder Mother         Cataracts     Heart Disease Mother         CHF, A-Fib     Osteoporosis Mother      Thyroid Disease Mother         Hypothyroidism     Alzheimer Disease Father      Eye Disorder Father         Cataracts     Heart Disease Father         Bypass     Thyroid Disease Maternal Grandmother      Thyroid Disease Brother         Hyperthyroidism     Depression Sister      Thyroid Disease Sister      Thyroid Disease Daughter         Hypothyroid         Current Outpatient Medications   Medication Sig Dispense Refill     albuterol (PROAIR HFA/PROVENTIL HFA/VENTOLIN HFA) 108 (90 Base) MCG/ACT inhaler Inhale 2 puffs into the lungs every 6 hours 8.5 g 0     buPROPion (ZYBAN) 150 MG 12 hr tablet Initial: 150 mg once daily for 3 days; increase to 150 mg twice daily. 180 tablet 1     Cholecalciferol (VITAMIN D) 2000 UNIT tablet Take 1,000 Units by mouth daily        levothyroxine (SYNTHROID/LEVOTHROID) 25 MCG tablet Take 1 tablet (25 mcg) by mouth daily 90 tablet 3     nicotine polacrilex ([employer] NICOTINE) 2 MG lozenge Place 1 lozenge (2 mg) inside cheek every hour as needed for smoking cessation 360 lozenge 1     OMEPRAZOLE PO        order  for DME Equipment being ordered: spacer 1 Device 0     Allergies   Allergen Reactions     Ibuprofen Hives     Penicillins Hives     Tetracyclines Hives     BP Readings from Last 3 Encounters:   04/05/19 120/66   02/04/19 110/42   10/26/18 130/60    Wt Readings from Last 3 Encounters:   04/05/19 45.4 kg (100 lb)   02/04/19 45.4 kg (100 lb)   10/26/18 45.4 kg (100 lb)                    Reviewed and updated as needed this visit by clinical staff  Tobacco  Allergies  Meds  Problems  Med Hx  Surg Hx  Fam Hx  Soc Hx        Reviewed and updated as needed this visit by Provider  Tobacco  Allergies  Meds  Problems  Med Hx  Surg Hx  Fam Hx         ROS:  Constitutional, HEENT, cardiovascular, pulmonary, GI, , musculoskeletal, neuro, skin, endocrine and psych systems are negative, except as otherwise noted.    OBJECTIVE:     /66 (BP Location: Right arm, Patient Position: Sitting, Cuff Size: Adult Regular)   Pulse 87   Temp 98.1  F (36.7  C) (Oral)   Resp 16   Wt 45.4 kg (100 lb)   SpO2 100%   BMI 17.16 kg/m    Body mass index is 17.16 kg/m .  GENERAL: alert, no distress and frail  RESP: no rales , no rhonchi, no wheezes and prolonged expiratory phase  CV: regular rates and rhythm, normal S1 S2, no S3 or S4 and no murmur, click or rub  PSYCH: mentation appears normal, affect normal/bright    Diagnostic Test Results:  none     ASSESSMENT/PLAN:     (F32.0) Mild major depression (H)  (primary encounter diagnosis)  Comment: history of this. Failed zoloft and celexa in the past. Unclear why wellbutrin xL was stopped. Patient states could be this was never started. Given smoking history and copd, will attempt zyban bid and recommending follow up in 3-6 months with pcp for recheck.   Plan: buPROPion (ZYBAN) 150 MG 12 hr tablet        -Medication use and side effects discussed with the patient. Patient is in complete understanding and agreement with plan.       (J43.9) Pulmonary emphysema, unspecified  emphysema type (H)  Comment: ongoing. Stable without controlled medications, but educated on importance of smoking cessation as above. Albuterol given for prn use if needed. Follow up as above.   Plan: albuterol (PROAIR HFA/PROVENTIL HFA/VENTOLIN         HFA) 108 (90 Base) MCG/ACT inhaler, order for         DME        -Medication use and side effects discussed with the patient. Patient is in complete understanding and agreement with plan.       (F17.200) Tobacco dependence  Comment: see above.   Plan: buPROPion (ZYBAN) 150 MG 12 hr tablet            (E46) Protein-calorie malnutrition, unspecified severity (H)  Comment: ongoing. Stable, but recommending consult with nutritionist.   Plan: NUTRITION REFERRAL              Follow up: as above     Justin Stevens PA-C  [facility]    Answers for HPI/ROS submitted by the patient on 4/5/2019   If you checked off any problems, how difficult have these problems made it for you to do your work, take care of things at home, or get along with other people?: Somewhat difficult  PHQ9 TOTAL SCORE: 14  VERÓNICA 7 TOTAL SCORE: 16

## 2019-04-06 ASSESSMENT — PATIENT HEALTH QUESTIONNAIRE - PHQ9: SUM OF ALL RESPONSES TO PHQ QUESTIONS 1-9: 14

## 2019-04-06 ASSESSMENT — ANXIETY QUESTIONNAIRES: GAD7 TOTAL SCORE: 16

## 2019-04-30 ENCOUNTER — HOSPITAL ENCOUNTER (OUTPATIENT)
Dept: CT IMAGING | Facility: CLINIC | Age: 69
Discharge: HOME | End: 2019-04-30
Attending: NURSE PRACTITIONER | Admitting: NURSE PRACTITIONER
Payer: MEDICARE

## 2019-04-30 DIAGNOSIS — R91.1 LUNG NODULE, SOLITARY: ICD-10-CM

## 2019-04-30 PROCEDURE — 71250 CT THORAX DX C-: CPT

## 2019-05-07 ENCOUNTER — TELEPHONE (OUTPATIENT)
Dept: FAMILY MEDICINE | Facility: CLINIC | Age: 69
End: 2019-05-07

## 2019-05-07 DIAGNOSIS — R91.1 LUNG NODULE, SOLITARY: Primary | ICD-10-CM

## 2019-05-07 NOTE — TELEPHONE ENCOUNTER
Routed to , please advise CT result and inform pt  Vandana Russell RN, BSN  Message handled by Nurse Triage.

## 2019-05-08 NOTE — TELEPHONE ENCOUNTER
Spoke with Jeannine informed of CT results, follow up in 6 months with repeat CT, order placed, will mail results.   Susan Haase, CNP

## 2019-08-19 ENCOUNTER — OFFICE VISIT (OUTPATIENT)
Dept: FAMILY MEDICINE | Facility: CLINIC | Age: 69
End: 2019-08-19
Payer: MEDICARE

## 2019-08-19 VITALS
DIASTOLIC BLOOD PRESSURE: 71 MMHG | OXYGEN SATURATION: 98 % | RESPIRATION RATE: 16 BRPM | WEIGHT: 98 LBS | BODY MASS INDEX: 16.82 KG/M2 | SYSTOLIC BLOOD PRESSURE: 130 MMHG | HEART RATE: 85 BPM | TEMPERATURE: 97.9 F

## 2019-08-19 DIAGNOSIS — R94.31 ABNORMAL ELECTROCARDIOGRAM: ICD-10-CM

## 2019-08-19 DIAGNOSIS — E03.9 HYPOTHYROIDISM, UNSPECIFIED TYPE: ICD-10-CM

## 2019-08-19 DIAGNOSIS — E78.00 HYPERCHOLESTEROLEMIA: ICD-10-CM

## 2019-08-19 DIAGNOSIS — H26.9 CATARACT OF BOTH EYES, UNSPECIFIED CATARACT TYPE: ICD-10-CM

## 2019-08-19 DIAGNOSIS — Z01.818 PREOP GENERAL PHYSICAL EXAM: Primary | ICD-10-CM

## 2019-08-19 LAB
BASOPHILS # BLD AUTO: 0.1 10E9/L (ref 0–0.2)
BASOPHILS NFR BLD AUTO: 0.9 %
DIFFERENTIAL METHOD BLD: NORMAL
EOSINOPHIL # BLD AUTO: 0.2 10E9/L (ref 0–0.7)
EOSINOPHIL NFR BLD AUTO: 3.6 %
ERYTHROCYTE [DISTWIDTH] IN BLOOD BY AUTOMATED COUNT: 13.1 % (ref 10–15)
HCT VFR BLD AUTO: 42.9 % (ref 35–47)
HGB BLD-MCNC: 14.7 G/DL (ref 11.7–15.7)
LYMPHOCYTES # BLD AUTO: 1.3 10E9/L (ref 0.8–5.3)
LYMPHOCYTES NFR BLD AUTO: 19.8 %
MCH RBC QN AUTO: 31.1 PG (ref 26.5–33)
MCHC RBC AUTO-ENTMCNC: 34.3 G/DL (ref 31.5–36.5)
MCV RBC AUTO: 91 FL (ref 78–100)
MONOCYTES # BLD AUTO: 0.6 10E9/L (ref 0–1.3)
MONOCYTES NFR BLD AUTO: 9.8 %
NEUTROPHILS # BLD AUTO: 4.2 10E9/L (ref 1.6–8.3)
NEUTROPHILS NFR BLD AUTO: 65.9 %
PLATELET # BLD AUTO: 208 10E9/L (ref 150–450)
RBC # BLD AUTO: 4.72 10E12/L (ref 3.8–5.2)
WBC # BLD AUTO: 6.4 10E9/L (ref 4–11)

## 2019-08-19 PROCEDURE — 84443 ASSAY THYROID STIM HORMONE: CPT | Performed by: NURSE PRACTITIONER

## 2019-08-19 PROCEDURE — 93000 ELECTROCARDIOGRAM COMPLETE: CPT | Performed by: NURSE PRACTITIONER

## 2019-08-19 PROCEDURE — 36415 COLL VENOUS BLD VENIPUNCTURE: CPT | Performed by: NURSE PRACTITIONER

## 2019-08-19 PROCEDURE — 85025 COMPLETE CBC W/AUTO DIFF WBC: CPT | Performed by: NURSE PRACTITIONER

## 2019-08-19 PROCEDURE — 99214 OFFICE O/P EST MOD 30 MIN: CPT | Performed by: NURSE PRACTITIONER

## 2019-08-19 RX ORDER — PRAVASTATIN SODIUM 10 MG
10 TABLET ORAL DAILY
Qty: 90 TABLET | Refills: 0 | Status: SHIPPED | OUTPATIENT
Start: 2019-08-19 | End: 2019-11-02

## 2019-08-19 SDOH — SOCIAL STABILITY: SOCIAL NETWORK: ARE YOU MARRIED, WIDOWED, DIVORCED, SEPARATED, NEVER MARRIED, OR LIVING WITH A PARTNER?: WIDOWED

## 2019-08-19 SDOH — HEALTH STABILITY: MENTAL HEALTH: HOW OFTEN DO YOU HAVE 6 OR MORE DRINKS ON ONE OCCASION?: NEVER

## 2019-08-19 SDOH — ECONOMIC STABILITY: TRANSPORTATION INSECURITY
IN THE PAST 12 MONTHS, HAS LACK OF TRANSPORTATION KEPT YOU FROM MEETINGS, WORK, OR FROM GETTING THINGS NEEDED FOR DAILY LIVING?: YES

## 2019-08-19 SDOH — SOCIAL STABILITY: SOCIAL NETWORK: HOW OFTEN DO YOU ATTENT MEETINGS OF THE CLUB OR ORGANIZATION YOU BELONG TO?: PATIENT DECLINED

## 2019-08-19 SDOH — SOCIAL STABILITY: SOCIAL NETWORK
DO YOU BELONG TO ANY CLUBS OR ORGANIZATIONS SUCH AS CHURCH GROUPS UNIONS, FRATERNAL OR ATHLETIC GROUPS, OR SCHOOL GROUPS?: YES

## 2019-08-19 SDOH — ECONOMIC STABILITY: FOOD INSECURITY: WITHIN THE PAST 12 MONTHS, THE FOOD YOU BOUGHT JUST DIDN'T LAST AND YOU DIDN'T HAVE MONEY TO GET MORE.: NEVER TRUE

## 2019-08-19 SDOH — SOCIAL STABILITY: SOCIAL NETWORK: HOW OFTEN DO YOU ATTEND CHURCH OR RELIGIOUS SERVICES?: MORE THAN 4 TIMES PER YEAR

## 2019-08-19 SDOH — SOCIAL STABILITY: SOCIAL NETWORK: HOW OFTEN DO YOU GET TOGETHER WITH FRIENDS OR RELATIVES?: MORE THAN THREE TIMES A WEEK

## 2019-08-19 SDOH — HEALTH STABILITY: PHYSICAL HEALTH: ON AVERAGE, HOW MANY DAYS PER WEEK DO YOU ENGAGE IN MODERATE TO STRENUOUS EXERCISE (LIKE A BRISK WALK)?: 0 DAYS

## 2019-08-19 SDOH — ECONOMIC STABILITY: INCOME INSECURITY: HOW HARD IS IT FOR YOU TO PAY FOR THE VERY BASICS LIKE FOOD, HOUSING, MEDICAL CARE, AND HEATING?: NOT VERY HARD

## 2019-08-19 SDOH — HEALTH STABILITY: MENTAL HEALTH
STRESS IS WHEN SOMEONE FEELS TENSE, NERVOUS, ANXIOUS, OR CAN'T SLEEP AT NIGHT BECAUSE THEIR MIND IS TROUBLED. HOW STRESSED ARE YOU?: RATHER MUCH

## 2019-08-19 SDOH — HEALTH STABILITY: MENTAL HEALTH: HOW OFTEN DO YOU HAVE A DRINK CONTAINING ALCOHOL?: NEVER

## 2019-08-19 SDOH — ECONOMIC STABILITY: TRANSPORTATION INSECURITY
IN THE PAST 12 MONTHS, HAS THE LACK OF TRANSPORTATION KEPT YOU FROM MEDICAL APPOINTMENTS OR FROM GETTING MEDICATIONS?: NO

## 2019-08-19 SDOH — SOCIAL STABILITY: SOCIAL NETWORK
IN A TYPICAL WEEK, HOW MANY TIMES DO YOU TALK ON THE PHONE WITH FAMILY, FRIENDS, OR NEIGHBORS?: MORE THAN THREE TIMES A WEEK

## 2019-08-19 SDOH — ECONOMIC STABILITY: FOOD INSECURITY: WITHIN THE PAST 12 MONTHS, YOU WORRIED THAT YOUR FOOD WOULD RUN OUT BEFORE YOU GOT MONEY TO BUY MORE.: NEVER TRUE

## 2019-08-19 NOTE — LETTER
August 20, 2019      Jeannine CED Carpio  [address] [place] [address]        Dear Jeannine,    Your CBC (checks for anemia and infection) and TSH (thyroid function) is also normal.     Resulted Orders   CBC with platelets differential   Result Value Ref Range    WBC 6.4 4.0 - 11.0 10e9/L    RBC Count 4.72 3.8 - 5.2 10e12/L    Hemoglobin 14.7 11.7 - 15.7 g/dL    Hematocrit 42.9 35.0 - 47.0 %    MCV 91 78 - 100 fl    MCH 31.1 26.5 - 33.0 pg    MCHC 34.3 31.5 - 36.5 g/dL    RDW 13.1 10.0 - 15.0 %    Platelet Count 208 150 - 450 10e9/L    % Neutrophils 65.9 %    % Lymphocytes 19.8 %    % Monocytes 9.8 %    % Eosinophils 3.6 %    % Basophils 0.9 %    Absolute Neutrophil 4.2 1.6 - 8.3 10e9/L    Absolute Lymphocytes 1.3 0.8 - 5.3 10e9/L    Absolute Monocytes 0.6 0.0 - 1.3 10e9/L    Absolute Eosinophils 0.2 0.0 - 0.7 10e9/L    Absolute Basophils 0.1 0.0 - 0.2 10e9/L    Diff Method Automated Method    TSH with free T4 reflex   Result Value Ref Range    TSH 2.33 0.40 - 4.00 mU/L     If you have any questions or concerns, please call the clinic at the number listed above.     Sincerely,    Susan Haase, APRN CNP/

## 2019-08-19 NOTE — PROGRESS NOTES
"Future Appointments   Date Time Provider Department Center   2019  9:30 AM Haase, Susan Rachele, APRN CNP [facility]     Appointment Notes for this encounter:   preop surgery 19 Cataract surgery     COPD action plan due - COPD NOT on problem list     Health Maintenance Due   Topic Date Due     DEXA  1950     HEPATITIS C SCREENING  1950     ANNUAL REVIEW OF HM ORDERS  1950     COPD ACTION PLAN  1950     ZOSTER IMMUNIZATION (1 of 2) 2002     ADVANCE CARE PLANNING  2017     MEDICARE ANNUAL WELLNESS VISIT  2018     MAMMO SCREENING  2019     FALL RISK ASSESSMENT  2019     FIT  2019       Pre-visit planning reviewed items:   Reviewed HWBP for upcoming orders in Health Maintenance., Reviewed HWBP for due questionnaires. and BestPractice Alerts.    Patient preferred phone number: [phone]   Patient contacted. did not contact patient visit today     Actions completed:   Confirmed that the visit type and provider(s) are appropriate for the pt's reason for visit.  Assigned any additional questionnaires.  Marked \"Pre-visit Planning Complete\" via Schedule activity.  My Chart Inactive - note placed on appt to discuss      Chelsey Ocampo Registered Nurse   [address] [address] [phone]  Dept: [phone]    PRE-OP EVALUATION:  Today's date: 2019    Jeannine Carpio (: 1950) presents for pre-operative evaluation assessment as requested by  .  She requires evaluation and anesthesia risk assessment prior to undergoing surgery/procedure for treatment of Cataracts .    Fax number for surgical facility: [facility] [phone]   Primary Physician: Haase, Susan Rachele  Type of Anesthesia Anticipated: General    Patient has a Health Care Directive or Living Will:  NO    Preop Questions 2019   1.  Do you have a history of Heart attack, " stroke, stent, coronary bypass surgery, or other heart surgery? No   2.  Do you ever have any pain or discomfort in your chest? No   3.  Do you have a history of  Heart Failure? No   4.   Are you troubled by shortness of breath when:  walking on a level surface, or up a slight hill, or at night? YES - ongoing due to smoking history   5.  Do you currently have a cold, bronchitis or other respiratory infection? UNKNOWN -          HPI:     HPI related to upcoming procedure: bilateral cataract surgery due to vision change    Smoker:  Long term smoker with Anxiety and depression:  Worse at this time, smoking 14-15 cigg per day.    Omeprazole;  Take on daily basis.        MEDICAL HISTORY:     Patient Active Problem List    Diagnosis Date Noted     Pulmonary emphysema, unspecified emphysema type (H) 10/26/2018     Priority: Medium     Lung nodule, solitary 10/19/2018     Priority: Medium     LLL, see CT 10/2018,repeat CT in 6 months        Protein-calorie malnutrition, unspecified severity (H) 02/19/2018     Priority: Medium     Hypothyroidism, unspecified type 02/19/2018     Priority: Medium     Elevated glucose 08/21/2015     Priority: Medium     Mild major depression (H) 06/22/2012     Priority: Medium     Fatigue 06/22/2012     Priority: Medium     Vitamin D insufficiency 06/22/2012     Priority: Medium     Advanced directives, counseling/discussion 02/17/2012     Priority: Medium     Advance Directive Problem List Overview:   Name Relationship Phone    Primary Health Care Agent            Alternative Health Care Agent          Discussed advance care planning with patient; however, patient declined at this time. 2/17/2012          Social anxiety disorder 02/17/2012     Priority: Medium     DDD (degenerative disc disease), lumbar 12/15/2010     Priority: Medium     Vaginal atrophy 11/01/2010     Priority: Medium     Menopause 11/01/2010     Priority: Medium     CARDIOVASCULAR SCREENING; LDL GOAL LESS THAN 130 10/31/2010      Priority: Medium     The 10-year ASCVD risk score (Charly MCCAULEY Jr, et al., 2013) is: 12%    Values used to calculate the score:      Age: 68 years      Sex: Female      Is Non- : No      Diabetic: No      Tobacco smoker: Yes      Systolic Blood Pressure: 130 mmHg      Is BP treated: No      HDL Cholesterol: 51 mg/dL      Total Cholesterol: 216 mg/dL         Lumbar radiculopathy 10/29/2010     Priority: Medium     Tobacco dependence 2009     Priority: Medium     Arthralgia 2009     Priority: Medium      Past Medical History:   Diagnosis Date     Abnormal glandular Papanicolaou smear of cervix     cryo and LEEP done, normal since     Delivery normal      1 miscarriage     Menopause age 54     Past Surgical History:   Procedure Laterality Date     LEEP TX, CERVICAL      LEEP TX Cervical     Current Outpatient Medications   Medication Sig Dispense Refill     albuterol (PROAIR HFA/PROVENTIL HFA/VENTOLIN HFA) 108 (90 Base) MCG/ACT inhaler Inhale 2 puffs into the lungs every 6 hours 8.5 g 0     buPROPion (ZYBAN) 150 MG 12 hr tablet Initial: 150 mg once daily for 3 days; increase to 150 mg twice daily. 180 tablet 1     Cholecalciferol (VITAMIN D) 2000 UNIT tablet Take 1,000 Units by mouth daily        levothyroxine (SYNTHROID/LEVOTHROID) 25 MCG tablet Take 1 tablet (25 mcg) by mouth daily 90 tablet 3     nicotine polacrilex ([employer] NICOTINE) 2 MG lozenge Place 1 lozenge (2 mg) inside cheek every hour as needed for smoking cessation 360 lozenge 1     OMEPRAZOLE PO        order for DME Equipment being ordered: spacer 1 Device 0     OTC products: None, except as noted above    Allergies   Allergen Reactions     Ibuprofen Hives     Penicillins Hives     Tetracyclines Hives      Latex Allergy: NO    Social History     Tobacco Use     Smoking status: Current Every Day Smoker     Packs/day: 0.50     Types: Cigarettes     Smokeless tobacco: Never Used   Substance Use Topics     Alcohol use: No      History   Drug Use No       REVIEW OF SYSTEMS:   CONSTITUTIONAL: NEGATIVE for fever, chills, change in weight  INTEGUMENTARY/SKIN: NEGATIVE for worrisome rashes, moles or lesions  EYES: NEGATIVE for vision changes or irritation  ENT/MOUTH: NEGATIVE for ear, mouth and throat problems  RESP: NEGATIVE for significant cough or SOB  BREAST: NEGATIVE for masses, tenderness or discharge  CV: NEGATIVE for chest pain, palpitations or peripheral edema  GI: NEGATIVE for nausea, abdominal pain, heartburn, or change in bowel habits  : NEGATIVE for frequency, dysuria, or hematuria  MUSCULOSKELETAL: NEGATIVE for significant arthralgias or myalgia  NEURO: NEGATIVE for weakness, dizziness or paresthesias  ENDOCRINE: NEGATIVE for temperature intolerance, skin/hair changes  HEME: NEGATIVE for bleeding problems  PSYCHIATRIC: NEGATIVE for changes in mood or affect    EXAM:   There were no vitals taken for this visit.    GENERAL APPEARANCE: healthy, alert and no distress     EYES: EOMI, PERRL     HENT: ear canals and TM's normal and nose and mouth without ulcers or lesions     NECK: no adenopathy, no asymmetry, masses, or scars and thyroid normal to palpation     RESP: lungs clear to auscultation - no rales, rhonchi or wheezes     CV: regular rates and rhythm, normal S1 S2, no S3 or S4 and no murmur, click or rub     ABDOMEN:  soft, nontender, no HSM or masses and bowel sounds normal     MS: extremities normal- no gross deformities noted, no evidence of inflammation in joints, FROM in all extremities.     SKIN: no suspicious lesions or rashes     NEURO: Normal strength and tone, sensory exam grossly normal, mentation intact and speech normal     PSYCH: mentation appears normal. and affect normal/bright     LYMPHATICS: No cervical adenopathy    DIAGNOSTICS:   {DIAGNOSTIC FOR PREOP:749485}    Recent Labs   Lab Test 11/10/18  0831 09/11/18  1001 02/19/18  0810   HGB  --  15.6 15.5   PLT  --  239 215    139  --    POTASSIUM 3.7  "4.2  --    CR 0.77 0.65  --    A1C  --  5.6  --         IMPRESSION:   {PREOP REASONS:141798::\"Reason for surgery/procedure: ***\",\"Diagnosis/reason for consult: ***\"}    The proposed surgical procedure is considered {HIGH=major cardiovascular or procedures requiring prolonged anesthesia >4 hours or large fluid shifts;    INTERMEDIATE=abdominal, most orthopedic and intrathoracic surgery; LOW= endoscopy, cataract and breast surgery:144660} risk.    REVISED CARDIAC RISK INDEX  The patient has the following serious cardiovascular risks for perioperative complications such as (MI, PE, VFib and 3  AV Block):  {PREOP REVISED CARDIAC INDEX (RCI):406663:p:\"No serious cardiac risks\"}  INTERPRETATION: {REVISED CARDIAC RISK INTERPRETATION:711128}    The patient has the following additional risks for perioperative complications:  {Additional perioperative risks:201740:p:\"No identified additional risks\"}      ICD-10-CM    1. Screening for osteoporosis Z13.820 DEXA HIP/PELVIS/SPINE - Future     REVIEW OF HEALTH MAINTENANCE PROTOCOL ORDERS   2. Need for hepatitis C screening test Z11.59 Hepatitis C Screen Reflex to HCV RNA Quant and Genotype     REVIEW OF HEALTH MAINTENANCE PROTOCOL ORDERS   3. Encounter for screening mammogram for breast cancer Z12.31 MA SCREENING DIGITAL BILAT - Future  (s+30)     REVIEW OF HEALTH MAINTENANCE PROTOCOL ORDERS   4. Need for shingles vaccine Z23 ZOSTER VACCINE RECOMBINANT ADJUVANTED IM NJX   5. Preop general physical exam Z01.818        RECOMMENDATIONS:     {IMPORTANT - Conditions - complete carefully!!:698554}    {IMPORTANT - Medications:823515::\"--Patient is to take all scheduled medications on the day of surgery EXCEPT for modifications listed below.\"}    {IMPORTANT - Approval:468830:p:\"APPROVAL GIVEN to proceed with proposed procedure, without further diagnostic evaluation\"}       Signed Electronically by: Susan Haase, APRN CNP    Copy of this evaluation report is provided to requesting " physician.    [facility] Preop Guidelines    Revised Cardiac Risk Index

## 2019-08-19 NOTE — PROGRESS NOTES
[facility]  [id number] [place] [id number] [phone]  Dept: [phone]    PRE-OP EVALUATION:  Today's date: 2019    Jeannine Carpio (: 1950) presents for pre-operative evaluation assessment as requested by  .  She requires evaluation and anesthesia risk assessment prior to undergoing surgery/procedure for treatment of Cataract  .    Fax number for surgical facility: [facility] [phone]  Primary Physician: Haase, Susan Rachele  Type of Anesthesia Anticipated: General    Patient has a Health Care Directive or Living Will:  NO    Preop Questions 2019   1.  Do you have a history of Heart attack, stroke, stent, coronary bypass surgery, or other heart surgery? No   2.  Do you ever have any pain or discomfort in your chest? No   3.  Do you have a history of  Heart Failure? No   4.   Are you troubled by shortness of breath when:  walking on a level surface, or up a slight hill, or at night? YES - due to smoking history, present for many years.    5.  Do you currently have a cold, bronchitis or other respiratory infection? No   6.  Do you have a cough, shortness of breath, or wheezing? YES - cough due to smoking   7.  Do you sometimes get pains in the calves of your legs when you walk? YES -    8. Do you or anyone in your family have previous history of blood clots? No   9.  Do you or does anyone in your family have a serious bleeding problem such as prolonged bleeding following surgeries or cuts? No   10. Have you ever had problems with anemia or been told to take iron pills? No   11. Have you had any abnormal blood loss such as black, tarry or bloody stools, or abnormal vaginal bleeding? No   12. Have you ever had a blood transfusion? No   13. Have you or any of your relatives ever had problems with anesthesia? YES - daughter issue with anesthesia,    14. Do you have sleep apnea, excessive snoring or daytime drowsiness? YES - fatigue   15.  Do you have any prosthetic heart valves? No   16. Do you have prosthetic joints? No   17. Is there any chance that you may be pregnant? No         HPI:     HPI related to upcoming procedure: cataract surgery, bilateral.  Smoking:  Smokes 1/2-1 ppd, last CT chest in 4/2019, 50 year smoking history.       MEDICAL HISTORY:     Patient Active Problem List    Diagnosis Date Noted     Pulmonary emphysema, unspecified emphysema type (H) 10/26/2018     Priority: Medium     Lung nodule, solitary 10/19/2018     Priority: Medium     LLL, see CT 10/2018,repeat CT in 6 months        Protein-calorie malnutrition, unspecified severity (H) 02/19/2018     Priority: Medium     Hypothyroidism, unspecified type 02/19/2018     Priority: Medium     Elevated glucose 08/21/2015     Priority: Medium     Mild major depression (H) 06/22/2012     Priority: Medium     Fatigue 06/22/2012     Priority: Medium     Vitamin D insufficiency 06/22/2012     Priority: Medium     Advanced directives, counseling/discussion 02/17/2012     Priority: Medium     Advance Directive Problem List Overview:   Name Relationship Phone    Primary Health Care Agent            Alternative Health Care Agent          Discussed advance care planning with patient; however, patient declined at this time. 2/17/2012          Social anxiety disorder 02/17/2012     Priority: Medium     DDD (degenerative disc disease), lumbar 12/15/2010     Priority: Medium     Vaginal atrophy 11/01/2010     Priority: Medium     Menopause 11/01/2010     Priority: Medium     CARDIOVASCULAR SCREENING; LDL GOAL LESS THAN 130 10/31/2010     Priority: Medium     The 10-year ASCVD risk score (Charlymelisa MCCAULEY Jr, et al., 2013) is: 12%    Values used to calculate the score:      Age: 68 years      Sex: Female      Is Non- : No      Diabetic: No      Tobacco smoker: Yes      Systolic Blood Pressure: 130 mmHg      Is BP treated: No      HDL Cholesterol: 51 mg/dL      Total Cholesterol: 216  mg/dL         Lumbar radiculopathy 10/29/2010     Priority: Medium     Tobacco dependence 2009     Priority: Medium     Arthralgia 2009     Priority: Medium      Past Medical History:   Diagnosis Date     Abnormal glandular Papanicolaou smear of cervix     cryo and LEEP done, normal since     Delivery normal      1 miscarriage     Menopause age 54     Past Surgical History:   Procedure Laterality Date     LEEP TX, CERVICAL      LEEP TX Cervical     Current Outpatient Medications   Medication Sig Dispense Refill     albuterol (PROAIR HFA/PROVENTIL HFA/VENTOLIN HFA) 108 (90 Base) MCG/ACT inhaler Inhale 2 puffs into the lungs every 6 hours 8.5 g 0     buPROPion (ZYBAN) 150 MG 12 hr tablet Initial: 150 mg once daily for 3 days; increase to 150 mg twice daily. 180 tablet 1     Cholecalciferol (VITAMIN D) 2000 UNIT tablet Take 1,000 Units by mouth daily        levothyroxine (SYNTHROID/LEVOTHROID) 25 MCG tablet Take 1 tablet (25 mcg) by mouth daily 90 tablet 3     nicotine polacrilex ([employer] NICOTINE) 2 MG lozenge Place 1 lozenge (2 mg) inside cheek every hour as needed for smoking cessation 360 lozenge 1     OMEPRAZOLE PO        order for DME Equipment being ordered: spacer 1 Device 0     OTC products: no recent use of OTC ASA, NSAIDS or Steroids    Allergies   Allergen Reactions     Ibuprofen Hives     Penicillins Hives     Tetracyclines Hives      Latex Allergy: NO    Social History     Tobacco Use     Smoking status: Current Every Day Smoker     Packs/day: 0.50     Types: Cigarettes     Smokeless tobacco: Never Used   Substance Use Topics     Alcohol use: No     Frequency: Never     Binge frequency: Never     History   Drug Use No       REVIEW OF SYSTEMS:   CONSTITUTIONAL: NEGATIVE for fever, chills, change in weight  INTEGUMENTARY/SKIN: NEGATIVE for worrisome rashes, moles or lesions  EYES: Vision change  ENT/MOUTH: NEGATIVE for ear, mouth and throat problems  RESP: NEGATIVE for significant cough or  SOB  CV: NEGATIVE for chest pain, palpitations or peripheral edema  GI: NEGATIVE for nausea, abdominal pain, heartburn, or change in bowel habits  : NEGATIVE for frequency, dysuria, or hematuria  MUSCULOSKELETAL: NEGATIVE for significant arthralgias or myalgia  NEURO: NEGATIVE for weakness, dizziness or paresthesias  ENDOCRINE: NEGATIVE for temperature intolerance, skin/hair changes  HEME: NEGATIVE for bleeding problems  PSYCHIATRIC: NEGATIVE for changes in mood or affect    EXAM:   /71 (BP Location: Right arm, Patient Position: Chair, Cuff Size: Adult Small)   Pulse 85   Temp 97.9  F (36.6  C) (Oral)   Resp 16   Wt 44.5 kg (98 lb)   SpO2 98%   Breastfeeding? No   BMI 16.82 kg/m      GENERAL APPEARANCE: healthy, alert and no distress     EYES: EOMI, PERRL     HENT: ear canals and TM's normal and nose and mouth without ulcers or lesions     NECK: no adenopathy, no asymmetry, masses, or scars and thyroid normal to palpation     RESP: lungs clear to auscultation - no rales, rhonchi or wheezes     CV: regular rates and rhythm, normal S1 S2, no S3 or S4 and no murmur, click or rub     ABDOMEN:  soft, nontender, no HSM or masses and bowel sounds normal     MS: extremities normal- no gross deformities noted, no evidence of inflammation in joints, FROM in all extremities.     SKIN: no suspicious lesions or rashes     NEURO: Normal strength and tone, sensory exam grossly normal, mentation intact and speech normal     PSYCH: mentation appears normal. and affect normal/bright     LYMPHATICS: No cervical adenopathy    DIAGNOSTICS:   EKG: appears normal, NSR, ischemic changes noted, no previous EKG's.  Stress echocardiogram completed on 8/23/2019 with normal results.     HGB:  14.7  IMPRESSION:   Reason for surgery/procedure: bilateral cataracts  Diagnosis/reason for consult: evaluation and anesthesia risk assessment prior to undergoing surgery      The proposed surgical procedure is considered LOW  risk.    REVISED CARDIAC RISK INDEX  The patient has the following serious cardiovascular risks for perioperative complications such as (MI, PE, VFib and 3  AV Block):  No serious cardiac risks  INTERPRETATION: 0 risks: Class I (very low risk - 0.4% complication rate)    The patient has the following additional risks for perioperative complications:  No identified additional risks    RECOMMENDATIONS:   Jeannine was seen today for pre-op exam.    Diagnoses and all orders for this visit:    Preop general physical exam  -     CBC with platelets differential  -     EKG 12-lead complete w/read - Clinics    Cataract of both eyes, unspecified cataract type    Abnormal electrocardiogram:  Stress echo completed on 8/23/2019 with normal results, may proceed with surgery.   -     Echocardiogram Exercise Stress; Future    Hypercholesterolemia  -     pravastatin (PRAVACHOL) 10 MG tablet; Take 1 tablet (10 mg) by mouth daily    Approval given to proceed with proposed procedure, without further diagnostic evaluation  Is aware of NPO orders and need to refrain from taking NSAIDS, Aspirin prior to surgery.      Signed Electronically by: Susan Haase, APRN CNP    Copy of this evaluation report is provided to requesting physician.    Ce Preop Guidelines    Revised Cardiac Risk Index

## 2019-08-20 LAB — TSH SERPL DL<=0.005 MIU/L-ACNC: 2.33 MU/L (ref 0.4–4)

## 2019-08-23 ENCOUNTER — HOSPITAL ENCOUNTER (OUTPATIENT)
Dept: CARDIOLOGY | Facility: CLINIC | Age: 69
Discharge: HOME | End: 2019-08-23
Attending: NURSE PRACTITIONER | Admitting: NURSE PRACTITIONER
Payer: MEDICARE

## 2019-08-23 DIAGNOSIS — R94.31 ABNORMAL ELECTROCARDIOGRAM: ICD-10-CM

## 2019-08-23 PROCEDURE — 93321 DOPPLER ECHO F-UP/LMTD STD: CPT | Mod: 26 | Performed by: INTERNAL MEDICINE

## 2019-08-23 PROCEDURE — 93350 STRESS TTE ONLY: CPT | Mod: TC

## 2019-08-23 PROCEDURE — 93350 STRESS TTE ONLY: CPT | Mod: 26 | Performed by: INTERNAL MEDICINE

## 2019-08-23 PROCEDURE — 93325 DOPPLER ECHO COLOR FLOW MAPG: CPT | Mod: 26 | Performed by: INTERNAL MEDICINE

## 2019-08-23 PROCEDURE — 93018 CV STRESS TEST I&R ONLY: CPT | Performed by: INTERNAL MEDICINE

## 2019-08-23 PROCEDURE — 93016 CV STRESS TEST SUPVJ ONLY: CPT | Performed by: INTERNAL MEDICINE

## 2019-08-23 RX ORDER — LEVOTHYROXINE SODIUM 25 UG/1
25 TABLET ORAL DAILY
Qty: 90 TABLET | Refills: 3 | Status: SHIPPED | OUTPATIENT
Start: 2019-08-23 | End: 2020-09-29

## 2019-08-26 ENCOUNTER — TELEPHONE (OUTPATIENT)
Dept: FAMILY MEDICINE | Facility: CLINIC | Age: 69
End: 2019-08-26

## 2019-08-26 NOTE — TELEPHONE ENCOUNTER
Patient notified of message below from Haase, Susan Rachele APRN CNP    No questions from patient at this time     Haase, Susan Rachele, APRN CNP  P Cr Triage             Please call lovely and let her know that her stress echo was normal, she can proceed with her surgery without any further testing.   Thanks,   Susan Haase, CNP Theresa Robertson, Registered Nurse   [facility]

## 2019-11-02 DIAGNOSIS — E78.00 HYPERCHOLESTEROLEMIA: ICD-10-CM

## 2019-11-04 RX ORDER — PRAVASTATIN SODIUM 10 MG
10 TABLET ORAL DAILY
Qty: 30 TABLET | Refills: 0 | Status: SHIPPED | OUTPATIENT
Start: 2019-11-04 | End: 2019-12-14

## 2019-11-04 NOTE — TELEPHONE ENCOUNTER
"Requested Prescriptions   Pending Prescriptions Disp Refills     pravastatin (PRAVACHOL) 10 MG tablet [Pharmacy Med Name: Pravastatin Sodium Oral Tablet 10 MG]  Last Written Prescription Date:  8/19/2019  Last Fill Quantity: 90 tablet,  # refills: 0   Last office visit: 8/19/2019 with prescribing provider:  Haase     Future Office Visit:   Next 5 appointments (look out 90 days)    Nov 14, 2019 11:30 AM CST  Non provider visit with KELSEY KRUGER/LPN  [facility] [address] [place] [id number] [phone]          90 tablet 0     Sig: Take 1 tablet (10 mg) by mouth daily.       Statins Protocol Passed - 11/2/2019 11:20 AM        Passed - LDL on file in past 12 months     Recent Labs   Lab Test 11/10/18  0831   *             Passed - No abnormal creatine kinase in past 12 months     No lab results found.             Passed - Recent (12 mo) or future (30 days) visit within the authorizing provider's specialty     Patient has had an office visit with the authorizing provider or a provider within the authorizing providers department within the previous 12 mos or has a future within next 30 days. See \"Patient Info\" tab in inbasket, or \"Choose Columns\" in Meds & Orders section of the refill encounter.              Passed - Medication is active on med list        Passed - Patient is age 18 or older        Passed - No active pregnancy on record        Passed - No positive pregnancy test in past 12 months          "

## 2019-11-04 NOTE — TELEPHONE ENCOUNTER
Medication is being filled for 1 time refill only due to:  due for fasting labs.   Lawanda Dewey RN

## 2019-11-14 ENCOUNTER — HOSPITAL ENCOUNTER (OUTPATIENT)
Dept: CT IMAGING | Facility: CLINIC | Age: 69
Discharge: HOME | End: 2019-11-14
Attending: NURSE PRACTITIONER | Admitting: NURSE PRACTITIONER
Payer: MEDICARE

## 2019-11-14 DIAGNOSIS — R91.1 LUNG NODULE, SOLITARY: ICD-10-CM

## 2019-11-14 PROCEDURE — 71250 CT THORAX DX C-: CPT

## 2019-11-20 ENCOUNTER — TELEPHONE (OUTPATIENT)
Dept: FAMILY MEDICINE | Facility: CLINIC | Age: 69
End: 2019-11-20

## 2019-11-20 NOTE — LETTER
November 22, 2019      Jeannine Carpio  [address] [place] [address]        Dear ,    We are writing to inform you of your test results.      Haase, Susan Rachele, APRN CNP   10:52 PM   Note      Please call Jeannine and let her know that her CT scan did not show any changes in the nodule from the scan completed in 4/2019.  I have sent a message to the specialist and am waiting to see if any further CT scans will be needed to follow the nodule.   Thanks,  Susan Haase, CNP            If you have any questions or concerns, please call the clinic at the number listed above.       Sincerely,        Susan Haase, APRN CNP/Patsy Ellsworth RN

## 2019-11-22 NOTE — TELEPHONE ENCOUNTER
Called patient back.  Advised of below message and plan.  Letter mailed to and copy of report also mailed to patient as well.  Patsy Ellsworth RN

## 2019-11-22 NOTE — TELEPHONE ENCOUNTER
Please call Jeannine and let her know that her CT scan did not show any changes in the nodule from the scan completed in 4/2019.  I have sent a message to the specialist and am waiting to see if any further CT scans will be needed to follow the nodule.   Thanks,  Susan Haase, CNP

## 2019-12-02 NOTE — TELEPHONE ENCOUNTER
12/02/2019    Patient called stating she never received the letter sent on 11/22/2019. Patient stated she really would like to have a paper copy and is requesting we send it to her address again. I verified address and it is correct on file.    [address] -Patient Representative

## 2019-12-14 DIAGNOSIS — E78.00 HYPERCHOLESTEROLEMIA: ICD-10-CM

## 2019-12-17 RX ORDER — PRAVASTATIN SODIUM 10 MG
TABLET ORAL
Qty: 30 TABLET | Refills: 0 | Status: SHIPPED | OUTPATIENT
Start: 2019-12-17 | End: 2020-01-03

## 2019-12-17 NOTE — TELEPHONE ENCOUNTER
Can you please call lovely and let her know that she needs to set up an appt for a fasting lab visit to get her cholesterol checked?  Her last visit for labs was in 11/2018, this needs to be done every year.  A 30 day supply has been sent.  Thanks,  Susan Haase, CNP

## 2019-12-18 NOTE — TELEPHONE ENCOUNTER
Called patient has to call back, please make lab only fasting appt for patient       Winnie Jenkins/MARTÍNEZ

## 2020-01-03 DIAGNOSIS — E78.00 HYPERCHOLESTEROLEMIA: ICD-10-CM

## 2020-01-03 LAB
ALBUMIN SERPL-MCNC: 3.9 G/DL (ref 3.4–5)
ALP SERPL-CCNC: 71 U/L (ref 40–150)
ALT SERPL W P-5'-P-CCNC: 26 U/L (ref 0–50)
ANION GAP SERPL CALCULATED.3IONS-SCNC: 6 MMOL/L (ref 3–14)
AST SERPL W P-5'-P-CCNC: 19 U/L (ref 0–45)
BILIRUB SERPL-MCNC: 0.9 MG/DL (ref 0.2–1.3)
BUN SERPL-MCNC: 13 MG/DL (ref 7–30)
CALCIUM SERPL-MCNC: 9.3 MG/DL (ref 8.5–10.1)
CHLORIDE SERPL-SCNC: 106 MMOL/L (ref 94–109)
CHOLEST SERPL-MCNC: 180 MG/DL
CO2 SERPL-SCNC: 28 MMOL/L (ref 20–32)
CREAT SERPL-MCNC: 0.75 MG/DL (ref 0.52–1.04)
GFR SERPL CREATININE-BSD FRML MDRD: 82 ML/MIN/{1.73_M2}
GLUCOSE SERPL-MCNC: 112 MG/DL (ref 70–99)
HDLC SERPL-MCNC: 54 MG/DL
LDLC SERPL CALC-MCNC: 93 MG/DL
NONHDLC SERPL-MCNC: 126 MG/DL
POTASSIUM SERPL-SCNC: 3.7 MMOL/L (ref 3.4–5.3)
PROT SERPL-MCNC: 7.1 G/DL (ref 6.8–8.8)
SODIUM SERPL-SCNC: 140 MMOL/L (ref 133–144)
TRIGL SERPL-MCNC: 164 MG/DL

## 2020-01-03 PROCEDURE — 80053 COMPREHEN METABOLIC PANEL: CPT | Performed by: NURSE PRACTITIONER

## 2020-01-03 PROCEDURE — 80061 LIPID PANEL: CPT | Performed by: NURSE PRACTITIONER

## 2020-01-03 PROCEDURE — 36415 COLL VENOUS BLD VENIPUNCTURE: CPT | Performed by: NURSE PRACTITIONER

## 2020-01-03 RX ORDER — PRAVASTATIN SODIUM 10 MG
10 TABLET ORAL DAILY
Qty: 90 TABLET | Refills: 3 | Status: SHIPPED | OUTPATIENT
Start: 2020-01-03 | End: 2020-12-30

## 2020-01-14 ENCOUNTER — TELEPHONE (OUTPATIENT)
Dept: FAMILY MEDICINE | Facility: CLINIC | Age: 70
End: 2020-01-14

## 2020-01-14 DIAGNOSIS — Z12.11 SPECIAL SCREENING FOR MALIGNANT NEOPLASMS, COLON: ICD-10-CM

## 2020-01-14 DIAGNOSIS — Z12.31 ENCOUNTER FOR SCREENING MAMMOGRAM FOR BREAST CANCER: Primary | ICD-10-CM

## 2020-01-14 NOTE — TELEPHONE ENCOUNTER
Summary:    Patient is due/failing the following:   FIT, MAMMOGRAM and PHQ9    Reviewed:  [x] CARE EVERYWHERE  [x] LAST OV NOTE INCLUDING ENDO  [x] FYI TAB  [x] LAST PANEL ENCOUNTER  [x] FUTURE APTS  [x] MYCHART STATUS   [x] IMMUNIZATIONS  Action needed:   Patient needs to do PHQ9. and Patient needs referral/order: Mammo and FIT    Type of outreach:    Phone, spoke to patient.  she is picking up the FIT test                                                                               Alivia TROTTER

## 2020-05-13 ENCOUNTER — TELEPHONE (OUTPATIENT)
Dept: FAMILY MEDICINE | Facility: CLINIC | Age: 70
End: 2020-05-13

## 2020-05-13 NOTE — TELEPHONE ENCOUNTER
Please call to schedule annual medicare wellness screening video visit with primary care provider.     Dustin Munoz PA-C on 5/13/2020 at 1:26 PM

## 2020-10-12 NOTE — PROGRESS NOTES
Pre-Visit Planning     Future Appointments   Date Time Provider Department Center   10/13/2020  2:00 PM Haase, Susan Rachele, APRN CNP [facility]   11/10/2020  9:20 AM Haase, Susan Rachele, APRN CNP [facility]     Arrival Time for this Appointment:  1:40 PM     Appointment Notes for this encounter:   2 new lumps on lower part of Left breast   Bumps under both armpits   Discuss thyroid     Questionnaires Reviewed/Assigned  Additional questionnaires assigned PHQ, VERÓNICA, FALL RISK     Hospital/ER visits since last pcp appt? NO     Imagin2019 Chest CT                                                            IMPRESSION:  Stable 4 mm pulmonary nodule in the left lower lobe since  2019     Lab review:    Triglycerides and glucose elevated 1/3/2020     MyChart  NOT active on my chart, appt note placed to discuss during check in process     Specialty Visits - NONE per chart review     Patient preferred phone number: [phone]    Spoke to patient via phone. Are there any additional questions or concerns you'd like to review with your provider during your visit? Yes: added to notes     Patient does have additional questions or concerns. Adjusted Appointment Notes.       Visit is not preventive.    Meds  Is there anything on your medication list that needs to be updated? Yes: reviewed and pended refills     Current Outpatient Medications   Medication     Cholecalciferol (VITAMIN D) 2000 UNIT tablet     levothyroxine (SYNTHROID/LEVOTHROID) 25 MCG tablet     OMEPRAZOLE PO     pravastatin (PRAVACHOL) 10 MG tablet     albuterol (PROAIR HFA/PROVENTIL HFA/VENTOLIN HFA) 108 (90 Base) MCG/ACT inhaler     No current facility-administered medications for this visit.      Which pharmacy do you prefer to use for medications during this visit if needed? [facility] in [place]     Do you need refills on any of your medications? Yes: pended     Health Maintenance Due   Topic Date Due     DEXCED  1950     HEPATITIS C SCREENING   "1950     ANNUAL REVIEW OF HM ORDERS  1950     COPD ACTION PLAN  1950     ZOSTER IMMUNIZATION (1 of 2) 05/04/2002     Pneumococcal Vaccine: 65+ Years (1 of 1 - PPSV23) 05/04/2017     MEDICARE ANNUAL WELLNESS VISIT  07/25/2018     MAMMO SCREENING  08/08/2019     FALL RISK ASSESSMENT  09/11/2019     COLORECTAL CANCER SCREENING  09/18/2019     PHQ-9  10/05/2019     DTAP/TDAP/TD IMMUNIZATION (2 - Td) 01/13/2020     INFLUENZA VACCINE (1) 09/01/2020     Patient is due for:  HM due pended, due for MWV, note placed on appt     Call Summary  \"Thank you for your time today.  If anything comes up before your appointment, please feel free to contact us at [phone].\"      Chelsey Ocampo, Registered Nurse, PAL (Patient Advocate Liason)   [facility]   [phone]         "

## 2020-10-13 ENCOUNTER — OFFICE VISIT (OUTPATIENT)
Dept: FAMILY MEDICINE | Facility: CLINIC | Age: 70
End: 2020-10-13
Payer: MEDICARE

## 2020-10-13 VITALS
HEIGHT: 63 IN | HEART RATE: 80 BPM | WEIGHT: 99.4 LBS | TEMPERATURE: 98.3 F | BODY MASS INDEX: 17.61 KG/M2 | RESPIRATION RATE: 14 BRPM | DIASTOLIC BLOOD PRESSURE: 74 MMHG | OXYGEN SATURATION: 97 % | SYSTOLIC BLOOD PRESSURE: 137 MMHG

## 2020-10-13 DIAGNOSIS — J43.9 PULMONARY EMPHYSEMA, UNSPECIFIED EMPHYSEMA TYPE (H): ICD-10-CM

## 2020-10-13 DIAGNOSIS — E46 PROTEIN-CALORIE MALNUTRITION, UNSPECIFIED SEVERITY (H): ICD-10-CM

## 2020-10-13 DIAGNOSIS — Z23 NEED FOR TD VACCINE: ICD-10-CM

## 2020-10-13 DIAGNOSIS — N63.15 BREAST LUMP ON RIGHT SIDE AT 3 O'CLOCK POSITION: ICD-10-CM

## 2020-10-13 DIAGNOSIS — F32.0 MILD MAJOR DEPRESSION (H): ICD-10-CM

## 2020-10-13 DIAGNOSIS — N63.0 LUMP OR MASS IN BREAST: Primary | ICD-10-CM

## 2020-10-13 PROCEDURE — 90714 TD VACC NO PRESV 7 YRS+ IM: CPT | Performed by: NURSE PRACTITIONER

## 2020-10-13 PROCEDURE — 99214 OFFICE O/P EST MOD 30 MIN: CPT | Mod: 25 | Performed by: NURSE PRACTITIONER

## 2020-10-13 PROCEDURE — 90471 IMMUNIZATION ADMIN: CPT | Performed by: NURSE PRACTITIONER

## 2020-10-13 RX ORDER — ALBUTEROL SULFATE 90 UG/1
2 AEROSOL, METERED RESPIRATORY (INHALATION) EVERY 6 HOURS
Qty: 8.5 G | Refills: 0 | Status: CANCELLED | OUTPATIENT
Start: 2020-10-13

## 2020-10-13 RX ORDER — LEVOTHYROXINE SODIUM 25 UG/1
25 TABLET ORAL DAILY
Qty: 90 TABLET | Refills: 0 | Status: CANCELLED | OUTPATIENT
Start: 2020-10-13

## 2020-10-13 ASSESSMENT — PATIENT HEALTH QUESTIONNAIRE - PHQ9
SUM OF ALL RESPONSES TO PHQ QUESTIONS 1-9: 13
10. IF YOU CHECKED OFF ANY PROBLEMS, HOW DIFFICULT HAVE THESE PROBLEMS MADE IT FOR YOU TO DO YOUR WORK, TAKE CARE OF THINGS AT HOME, OR GET ALONG WITH OTHER PEOPLE: SOMEWHAT DIFFICULT
SUM OF ALL RESPONSES TO PHQ QUESTIONS 1-9: 13

## 2020-10-13 ASSESSMENT — MIFFLIN-ST. JEOR: SCORE: 956.36

## 2020-10-13 ASSESSMENT — ANXIETY QUESTIONNAIRES
3. WORRYING TOO MUCH ABOUT DIFFERENT THINGS: NEARLY EVERY DAY
GAD7 TOTAL SCORE: 18
6. BECOMING EASILY ANNOYED OR IRRITABLE: SEVERAL DAYS
4. TROUBLE RELAXING: NEARLY EVERY DAY
2. NOT BEING ABLE TO STOP OR CONTROL WORRYING: NEARLY EVERY DAY
GAD7 TOTAL SCORE: 18
1. FEELING NERVOUS, ANXIOUS, OR ON EDGE: NEARLY EVERY DAY
5. BEING SO RESTLESS THAT IT IS HARD TO SIT STILL: NEARLY EVERY DAY
7. FEELING AFRAID AS IF SOMETHING AWFUL MIGHT HAPPEN: MORE THAN HALF THE DAYS
7. FEELING AFRAID AS IF SOMETHING AWFUL MIGHT HAPPEN: MORE THAN HALF THE DAYS
GAD7 TOTAL SCORE: 18

## 2020-10-13 NOTE — PROGRESS NOTES
"Subjective     Jeannine Carpio is a 70 year old female who presents to clinic today for the following health issues:    History of Present Illness       She eats 2-3 servings of fruits and vegetables daily.She consumes 0 sweetened beverage(s) daily.She exercises with enough effort to increase her heart rate 20 to 29 minutes per day.  She exercises with enough effort to increase her heart rate 3 or less days per week.   She is taking medications regularly.           Breast Concern  Onset/Duration: 1-2 weeks  Description:   Location: lower left breast  Pain or tenderness: YES  Redness: no  Intensity: mild  Progression of Symptoms: same  Accompanying Signs & Symptoms:  Any lumps in axillary region: YES  Movable: YES  Nipple discharge: no  Changes in the skin or nipple: no  On Hormone therapy: no  Does it change with menstrual cycle: no  Previous history of similar problem: yes  First degree relative with breast cancer: a positive family history for breast cancer in her cousin  Precipitating factors:           Worsened by: not sure  Alleviating factors:            Improved by: not sure  Therapies tried and outcome: None    Last mammogram in 8/2017 with cyst of left breast found.     Depression:  PhQ 9 of 13, VERÓNICA 7 score of 18.  Denies thoughts of harming self or others. Feels that depression is stable.     Pulmonary emphysema:  Using albuterol inhaler on a prn basis.      Review of Systems   CONSTITUTIONAL: NEGATIVE for fever, chills, change in weight  RESP: NEGATIVE for significant cough or SOB  BREAST: see HPI  CV: NEGATIVE for chest pain, palpitations or peripheral edema      Objective    /74 (BP Location: Right arm, Patient Position: Chair, Cuff Size: Adult Small)   Pulse 80   Temp 98.3  F (36.8  C) (Oral)   Resp 14   Ht 1.61 m (5' 3.4\")   Wt 45.1 kg (99 lb 6.4 oz)   SpO2 97%   BMI 17.39 kg/m    Body mass index is 17.39 kg/m .  Physical Exam   GENERAL: healthy, alert and no distress  RESP: lungs clear to " auscultation - no rales, rhonchi or wheezes  BREAST: right:  0.5 cm mass at 3:00, nontender, not mobile, no nipple discharge and no palpable axillary masses or adenopathy   Left:   0.5 cm cyst at 6:00, mobile, tender, no nipple discharge and no palpable axillary masses or adenopathy  CV: regular rate and rhythm, normal S1 S2, no S3 or S4, no murmur, click or rub, no peripheral edema             Assessment & Plan     Lump or mass in breast, right side at 3:00, left side at 6:00. Will order a diagnostic mammogram for further evaluation.    - MA Diagnostic Digital Bilateral  Depression:  PhQ 9 of 13, VERÓNICA 7 score of 18.  Denies thoughts of harming self or others. Feels that depression is stable.     Pulmonary emphysema:  Using albuterol inhaler on a prn basis.    Protein calorie malnutrition:  Weight is stable at 99 lbs.     Follow up on 11/10/2020 for physical exam, sooner as needed.          Susan Haase, APRN [facility]

## 2020-10-14 ASSESSMENT — PATIENT HEALTH QUESTIONNAIRE - PHQ9: SUM OF ALL RESPONSES TO PHQ QUESTIONS 1-9: 13

## 2020-10-14 ASSESSMENT — ANXIETY QUESTIONNAIRES: GAD7 TOTAL SCORE: 18

## 2020-11-10 ENCOUNTER — OFFICE VISIT (OUTPATIENT)
Dept: FAMILY MEDICINE | Facility: CLINIC | Age: 70
End: 2020-11-10
Payer: MEDICARE

## 2020-11-10 ENCOUNTER — ANCILLARY PROCEDURE (OUTPATIENT)
Dept: GENERAL RADIOLOGY | Facility: CLINIC | Age: 70
End: 2020-11-10
Attending: NURSE PRACTITIONER
Payer: MEDICARE

## 2020-11-10 VITALS
SYSTOLIC BLOOD PRESSURE: 121 MMHG | HEART RATE: 93 BPM | TEMPERATURE: 98.4 F | WEIGHT: 98.2 LBS | DIASTOLIC BLOOD PRESSURE: 72 MMHG | HEIGHT: 65 IN | BODY MASS INDEX: 16.36 KG/M2 | RESPIRATION RATE: 16 BRPM | OXYGEN SATURATION: 97 %

## 2020-11-10 DIAGNOSIS — Z13.6 CARDIOVASCULAR SCREENING; LDL GOAL LESS THAN 130: ICD-10-CM

## 2020-11-10 DIAGNOSIS — J43.9 PULMONARY EMPHYSEMA, UNSPECIFIED EMPHYSEMA TYPE (H): ICD-10-CM

## 2020-11-10 DIAGNOSIS — F32.0 MILD MAJOR DEPRESSION (H): ICD-10-CM

## 2020-11-10 DIAGNOSIS — R73.09 ELEVATED GLUCOSE: ICD-10-CM

## 2020-11-10 DIAGNOSIS — E03.9 HYPOTHYROIDISM, UNSPECIFIED TYPE: ICD-10-CM

## 2020-11-10 DIAGNOSIS — E46 PROTEIN-CALORIE MALNUTRITION, UNSPECIFIED SEVERITY (H): ICD-10-CM

## 2020-11-10 DIAGNOSIS — M79.674 GREAT TOE PAIN, RIGHT: ICD-10-CM

## 2020-11-10 DIAGNOSIS — Z00.00 ENCOUNTER FOR MEDICARE ANNUAL WELLNESS EXAM: Primary | ICD-10-CM

## 2020-11-10 DIAGNOSIS — E55.9 VITAMIN D INSUFFICIENCY: ICD-10-CM

## 2020-11-10 DIAGNOSIS — Z12.11 COLON CANCER SCREENING: ICD-10-CM

## 2020-11-10 DIAGNOSIS — R35.0 URINARY FREQUENCY: ICD-10-CM

## 2020-11-10 DIAGNOSIS — N63.0 BREAST MASS: ICD-10-CM

## 2020-11-10 LAB
ALBUMIN SERPL-MCNC: 3.9 G/DL (ref 3.4–5)
ALBUMIN UR-MCNC: NEGATIVE MG/DL
ALP SERPL-CCNC: 79 U/L (ref 40–150)
ALT SERPL W P-5'-P-CCNC: 26 U/L (ref 0–50)
ANION GAP SERPL CALCULATED.3IONS-SCNC: 6 MMOL/L (ref 3–14)
APPEARANCE UR: ABNORMAL
AST SERPL W P-5'-P-CCNC: 20 U/L (ref 0–45)
BACTERIA #/AREA URNS HPF: ABNORMAL /HPF
BASOPHILS # BLD AUTO: 0 10E9/L (ref 0–0.2)
BASOPHILS NFR BLD AUTO: 0.5 %
BILIRUB SERPL-MCNC: 0.7 MG/DL (ref 0.2–1.3)
BILIRUB UR QL STRIP: NEGATIVE
BUN SERPL-MCNC: 9 MG/DL (ref 7–30)
CALCIUM SERPL-MCNC: 9.1 MG/DL (ref 8.5–10.1)
CHLORIDE SERPL-SCNC: 106 MMOL/L (ref 94–109)
CHOLEST SERPL-MCNC: 183 MG/DL
CO2 SERPL-SCNC: 27 MMOL/L (ref 20–32)
COLOR UR AUTO: YELLOW
CREAT SERPL-MCNC: 0.65 MG/DL (ref 0.52–1.04)
DIFFERENTIAL METHOD BLD: NORMAL
EOSINOPHIL # BLD AUTO: 0.2 10E9/L (ref 0–0.7)
EOSINOPHIL NFR BLD AUTO: 2.1 %
ERYTHROCYTE [DISTWIDTH] IN BLOOD BY AUTOMATED COUNT: 13.3 % (ref 10–15)
GFR SERPL CREATININE-BSD FRML MDRD: >90 ML/MIN/{1.73_M2}
GLUCOSE SERPL-MCNC: 105 MG/DL (ref 70–99)
GLUCOSE UR STRIP-MCNC: NEGATIVE MG/DL
HBA1C MFR BLD: 5.7 % (ref 0–5.6)
HCT VFR BLD AUTO: 46 % (ref 35–47)
HDLC SERPL-MCNC: 48 MG/DL
HGB BLD-MCNC: 15.6 G/DL (ref 11.7–15.7)
HGB UR QL STRIP: ABNORMAL
KETONES UR STRIP-MCNC: NEGATIVE MG/DL
LDLC SERPL CALC-MCNC: 105 MG/DL
LEUKOCYTE ESTERASE UR QL STRIP: NEGATIVE
LYMPHOCYTES # BLD AUTO: 1.5 10E9/L (ref 0.8–5.3)
LYMPHOCYTES NFR BLD AUTO: 19.8 %
MCH RBC QN AUTO: 30.9 PG (ref 26.5–33)
MCHC RBC AUTO-ENTMCNC: 33.9 G/DL (ref 31.5–36.5)
MCV RBC AUTO: 91 FL (ref 78–100)
MONOCYTES # BLD AUTO: 0.8 10E9/L (ref 0–1.3)
MONOCYTES NFR BLD AUTO: 10.2 %
NEUTROPHILS # BLD AUTO: 5.1 10E9/L (ref 1.6–8.3)
NEUTROPHILS NFR BLD AUTO: 67.4 %
NITRATE UR QL: NEGATIVE
NON-SQ EPI CELLS #/AREA URNS LPF: ABNORMAL /LPF
NONHDLC SERPL-MCNC: 135 MG/DL
PH UR STRIP: 7 PH (ref 5–7)
PLATELET # BLD AUTO: 195 10E9/L (ref 150–450)
POTASSIUM SERPL-SCNC: 3.9 MMOL/L (ref 3.4–5.3)
PROT SERPL-MCNC: 7.2 G/DL (ref 6.8–8.8)
RBC # BLD AUTO: 5.05 10E12/L (ref 3.8–5.2)
RBC #/AREA URNS AUTO: ABNORMAL /HPF
SODIUM SERPL-SCNC: 139 MMOL/L (ref 133–144)
SOURCE: ABNORMAL
SP GR UR STRIP: 1.02 (ref 1–1.03)
TRIGL SERPL-MCNC: 148 MG/DL
TSH SERPL DL<=0.005 MIU/L-ACNC: 3.39 MU/L (ref 0.4–4)
URATE SERPL-MCNC: 2.7 MG/DL (ref 2.6–6)
UROBILINOGEN UR STRIP-ACNC: 0.2 EU/DL (ref 0.2–1)
WBC # BLD AUTO: 7.6 10E9/L (ref 4–11)
WBC #/AREA URNS AUTO: ABNORMAL /HPF

## 2020-11-10 PROCEDURE — 73660 X-RAY EXAM OF TOE(S): CPT | Mod: RT | Performed by: RADIOLOGY

## 2020-11-10 PROCEDURE — 84550 ASSAY OF BLOOD/URIC ACID: CPT | Performed by: NURSE PRACTITIONER

## 2020-11-10 PROCEDURE — 80053 COMPREHEN METABOLIC PANEL: CPT | Performed by: NURSE PRACTITIONER

## 2020-11-10 PROCEDURE — 82306 VITAMIN D 25 HYDROXY: CPT | Performed by: NURSE PRACTITIONER

## 2020-11-10 PROCEDURE — 80061 LIPID PANEL: CPT | Performed by: NURSE PRACTITIONER

## 2020-11-10 PROCEDURE — 84443 ASSAY THYROID STIM HORMONE: CPT | Performed by: NURSE PRACTITIONER

## 2020-11-10 PROCEDURE — 81001 URINALYSIS AUTO W/SCOPE: CPT | Performed by: NURSE PRACTITIONER

## 2020-11-10 PROCEDURE — 83036 HEMOGLOBIN GLYCOSYLATED A1C: CPT | Performed by: NURSE PRACTITIONER

## 2020-11-10 PROCEDURE — 36415 COLL VENOUS BLD VENIPUNCTURE: CPT | Performed by: NURSE PRACTITIONER

## 2020-11-10 PROCEDURE — 85025 COMPLETE CBC W/AUTO DIFF WBC: CPT | Performed by: NURSE PRACTITIONER

## 2020-11-10 PROCEDURE — G0439 PPPS, SUBSEQ VISIT: HCPCS | Performed by: NURSE PRACTITIONER

## 2020-11-10 PROCEDURE — 99213 OFFICE O/P EST LOW 20 MIN: CPT | Mod: 25 | Performed by: NURSE PRACTITIONER

## 2020-11-10 RX ORDER — ALBUTEROL SULFATE 90 UG/1
2 AEROSOL, METERED RESPIRATORY (INHALATION) EVERY 6 HOURS
Qty: 8.5 G | Refills: 0 | Status: SHIPPED | OUTPATIENT
Start: 2020-11-10 | End: 2020-11-10

## 2020-11-10 RX ORDER — ALBUTEROL SULFATE 90 UG/1
2 AEROSOL, METERED RESPIRATORY (INHALATION) EVERY 6 HOURS
Qty: 8.5 G | Refills: 4 | Status: SHIPPED | OUTPATIENT
Start: 2020-11-10 | End: 2022-09-13

## 2020-11-10 ASSESSMENT — ENCOUNTER SYMPTOMS
EYE PAIN: 1
NAUSEA: 0
HEADACHES: 1
PALPITATIONS: 0
JOINT SWELLING: 0
NERVOUS/ANXIOUS: 1
ARTHRALGIAS: 1
CONSTIPATION: 0
SORE THROAT: 0
DIZZINESS: 0
FREQUENCY: 1
HEMATOCHEZIA: 0
CHILLS: 0
ABDOMINAL PAIN: 0
WEAKNESS: 0
DIARRHEA: 0
MYALGIAS: 1
COUGH: 0
HEMATURIA: 0
SHORTNESS OF BREATH: 1
DYSURIA: 0
FEVER: 0
BREAST MASS: 1
PARESTHESIAS: 0

## 2020-11-10 ASSESSMENT — ACTIVITIES OF DAILY LIVING (ADL): CURRENT_FUNCTION: NO ASSISTANCE NEEDED

## 2020-11-10 ASSESSMENT — MIFFLIN-ST. JEOR: SCORE: 968.37

## 2020-11-10 NOTE — Clinical Note
Rodney Longoria,  Can you please reach out to Jeannine regarding her depression and anxiety?  Thanks,  Susan Haase, CNP

## 2020-11-10 NOTE — PATIENT INSTRUCTIONS
Patient Education   Personalized Prevention Plan  You are due for the preventive services outlined below.  Your care team is available to assist you in scheduling these services.  If you have already completed any of these items, please share that information with your care team to update in your medical record.  Health Maintenance Due   Topic Date Due     Osteoporosis Screening  1950     COPD Action Plan  1950     Hepatitis C Screening  05/04/1970     Zoster (Shingles) Vaccine (1 of 2) 05/04/2002     Discuss Advance Care Planning  02/17/2017     Pneumococcal Vaccine (1 of 1 - PPSV23) 05/04/2017     Annual Wellness Visit  07/25/2018     Mammogram  08/08/2019     Colorectal Cancer Screening  09/18/2019     Flu Vaccine (1) 09/01/2020

## 2020-11-10 NOTE — PROGRESS NOTES
"SUBJECTIVE:   Jeannine Carpio is a 70 year old female who presents for Preventive Visit.    {Patient has been advised of split billing requirements and indicates understanding: Yes   Are you in the first 12 months of your Medicare coverage?  No    Healthy Habits:     In general, how would you rate your overall health?  Fair    Frequency of exercise:  None    Do you usually eat at least 4 servings of fruit and vegetables a day, include whole grains    & fiber and avoid regularly eating high fat or \"junk\" foods?  No    Taking medications regularly:  Yes    Medication side effects:  None    Ability to successfully perform activities of daily living:  No assistance needed    Home Safety:  No safety concerns identified    Hearing Impairment:  Need to ask people to speak up or repeat themselves and difficulty understanding soft or whispered speech    In the past 6 months, have you been bothered by leaking of urine? Yes    In general, how would you rate your overall mental or emotional health?  Fair      PHQ-2 Total Score: 2    Additional concerns today:  No  Breast cancer screening:  Last in 8/2017, lump of bilateral breasts at last visit on 10/13, just called to set up a mammogram, appt on 11/27. Denies increase in size of lumps.  CT chest : last 11/2019  Colon cancer screening:  Last FIT in 9/2018  Dexa:   delined  Right big toe pain: Patient has been waking up the last couple of days and her right big toe has been hurting really bad that it is waking her up at night. Patient states she don't remember doing anything to it.  Denies redness or swelling.    Do you feel safe in your environment? Yes    Have you ever done Advance Care Planning? (For example, a Health Directive, POLST, or a discussion with a medical provider or your loved ones about your wishes): declined      Fall risk  Fallen 2 or more times in the past year?: No  Any fall with injury in the past year?: No    Cognitive Screening   1) Repeat 3 items (Leader, " Season, Table)    2) Clock draw: NORMAL  3) 3 item recall: Recalls 2 objects   Results: NORMAL clock, 1-2 items recalled: COGNITIVE IMPAIRMENT LESS LIKELY    Mini-CogTM Copyright DILSHAD Avery. Licensed by the author for use in [facility]; reprinted with permission (amber@[facility]). All rights reserved.      Do you have sleep apnea, excessive snoring or daytime drowsiness?: Day time drowsiness when she does not stay busy    Reviewed and updated as needed this visit by clinical staff  Tobacco  Allergies    Med Hx  Surg Hx  Fam Hx  Soc Hx        Reviewed and updated as needed this visit by Provider                Social History     Tobacco Use     Smoking status: Current Every Day Smoker     Packs/day: 0.50     Types: Cigarettes     Smokeless tobacco: Never Used   Substance Use Topics     Alcohol use: No     Frequency: Never     Binge frequency: Never         Alcohol Use 11/10/2020   Prescreen: >3 drinks/day or >7 drinks/week? No   Prescreen: >3 drinks/day or >7 drinks/week? -         Current providers sharing in care for this patient include:   Patient Care Team:  Haase, Susan Rachele, APRN CNP as PCP - General (Nurse Practitioner)  Haase, Susan Rachele, APRN CNP as Assigned PCP  Chelsey Ocampo RN as Personal Advocate & Liaison (PAL) (Nurse)    The following health maintenance items are reviewed in Epic and correct as of today:  Health Maintenance   Topic Date Due     DEXA  1950     COPD ACTION PLAN  1950     HEPATITIS C SCREENING  05/04/1970     ZOSTER IMMUNIZATION (1 of 2) 05/04/2002     ADVANCE CARE PLANNING  02/17/2017     Pneumococcal Vaccine: 65+ Years (1 of 1 - PPSV23) 05/04/2017     MAMMO SCREENING  08/08/2019     COLORECTAL CANCER SCREENING  09/18/2019     INFLUENZA VACCINE (1) 09/01/2020     PHQ-9  04/13/2021     ANNUAL REVIEW OF HM ORDERS  10/13/2021     FALL RISK ASSESSMENT  10/13/2021     MEDICARE ANNUAL WELLNESS VISIT  11/10/2021     LIPID  01/03/2025     DTAP/TDAP/TD  IMMUNIZATION (3 - Td) 10/13/2030     SPIROMETRY  Completed     DEPRESSION ACTION PLAN  Completed     Pneumococcal Vaccine: Pediatrics (0 to 5 Years) and At-Risk Patients (6 to 64 Years)  Aged Out     IPV IMMUNIZATION  Aged Out     MENINGITIS IMMUNIZATION  Aged Out     HEPATITIS B IMMUNIZATION  Aged Out     Lab work is in process  Labs reviewed in EPIC  BP Readings from Last 3 Encounters:   11/10/20 121/72   10/13/20 137/74   08/19/19 130/71    Wt Readings from Last 3 Encounters:   11/10/20 44.5 kg (98 lb 3.2 oz)   10/13/20 45.1 kg (99 lb 6.4 oz)   08/19/19 44.5 kg (98 lb)                  Patient Active Problem List   Diagnosis     Tobacco dependence     Arthralgia     Lumbar radiculopathy     CARDIOVASCULAR SCREENING; LDL GOAL LESS THAN 130     Vaginal atrophy     Menopause     DDD (degenerative disc disease), lumbar     Advanced directives, counseling/discussion     Social anxiety disorder     Mild major depression (H)     Fatigue     Vitamin D insufficiency     Elevated glucose     Protein-calorie malnutrition, unspecified severity (H)     Hypothyroidism, unspecified type     Lung nodule, solitary     Pulmonary emphysema, unspecified emphysema type (H)     Past Surgical History:   Procedure Laterality Date     LEEP TX, CERVICAL      LEEP TX Cervical       Social History     Tobacco Use     Smoking status: Current Every Day Smoker     Packs/day: 0.50     Types: Cigarettes     Smokeless tobacco: Never Used   Substance Use Topics     Alcohol use: No     Frequency: Never     Binge frequency: Never     Family History   Problem Relation Age of Onset     Diabetes Mother      Cerebrovascular Disease Mother      Arthritis Mother      Eye Disorder Mother         Cataracts     Heart Disease Mother         CHF, A-Fib     Osteoporosis Mother      Thyroid Disease Mother         Hypothyroidism     Alzheimer Disease Father      Eye Disorder Father         Cataracts     Heart Disease Father         Bypass     Thyroid Disease  Maternal Grandmother      Thyroid Disease Brother         Hyperthyroidism     Depression Sister      Thyroid Disease Sister      Thyroid Disease Daughter         Hypothyroid         Current Outpatient Medications   Medication Sig Dispense Refill     albuterol (PROAIR HFA/PROVENTIL HFA/VENTOLIN HFA) 108 (90 Base) MCG/ACT inhaler Inhale 2 puffs into the lungs every 6 hours 8.5 g 0     Cholecalciferol (VITAMIN D) 2000 UNIT tablet Take 1,000 Units by mouth daily        levothyroxine (SYNTHROID/LEVOTHROID) 25 MCG tablet TAKE 1 TABLET (25 MCG) BY MOUTH DAILY 90 tablet 0     OMEPRAZOLE PO        pravastatin (PRAVACHOL) 10 MG tablet Take 1 tablet (10 mg) by mouth daily 90 tablet 3     Allergies   Allergen Reactions     Ibuprofen Hives     Penicillins Hives     Tetracyclines Hives     Pneumonia Vaccine:offered, declined.  History of abnormal Pap smear:   Last 3 Pap Results:   PAP (no units)   Date Value   07/25/2017 NIL   08/18/2015 NIL   10/29/2010 NIL     Last 3 Pap and HPV Results:   PAP / HPV Latest Ref Rng & Units 7/25/2017 8/18/2015 10/29/2010   PAP - NIL NIL NIL   HPV 16 DNA NEG Negative Negative -   HPV 18 DNA NEG Negative Negative -   OTHER HR HPV NEG Negative Negative -       Review of Systems   Constitutional: Negative for chills and fever.   HENT: Positive for congestion, ear pain and hearing loss. Negative for sore throat.    Eyes: Positive for pain. Negative for visual disturbance.   Respiratory: Positive for shortness of breath. Negative for cough.    Cardiovascular: Negative for chest pain, palpitations and peripheral edema.   Gastrointestinal: Negative for abdominal pain, constipation, diarrhea, hematochezia and nausea.   Breasts:  Positive for tenderness and breast mass. Negative for discharge.   Genitourinary: Positive for frequency and urgency. Negative for dysuria, genital sores, hematuria, pelvic pain, vaginal bleeding and vaginal discharge.   Musculoskeletal: Positive for arthralgias and myalgias.  "Negative for joint swelling.   Skin: Negative for rash.   Neurological: Positive for headaches. Negative for dizziness, weakness and paresthesias.   Psychiatric/Behavioral: Negative for mood changes. The patient is nervous/anxious.    ,   Allergies: right eye tearing, congestion, headache.  Slight increase in cough.  Feels shortness of breath with tickle in throat. Is not taking anythiing OTC.  Urine:  Frequency and urgency, present for 6 months. Getting up 2-3 times per night to urinate.    Anxiet/depression:  PHQ 9 score of 13, VERÓNICA 7 of 18. Increased son with schizophrenia.        OBJECTIVE:   /72 (BP Location: Right arm, Patient Position: Sitting, Cuff Size: Adult Regular)   Pulse 93   Temp 98.4  F (36.9  C) (Oral)   Resp 16   Ht 1.638 m (5' 4.5\")   Wt 44.5 kg (98 lb 3.2 oz)   SpO2 97%   Breastfeeding No   BMI 16.60 kg/m   Estimated body mass index is 16.6 kg/m  as calculated from the following:    Height as of this encounter: 1.638 m (5' 4.5\").    Weight as of this encounter: 44.5 kg (98 lb 3.2 oz).  Physical Exam  GENERAL APPEARANCE: very thin, alert and no distress  EYES: Eyes watery,   HENT: ear canals and TM's normal, nose and mouth without ulcers or lesions, oropharynx clear and oral mucous membranes moist  NECK: no adenopathy, no asymmetry, masses, or scars and thyroid normal to palpation  RESP: lungs clear to auscultation - no rales, rhonchi or wheezes  BREAST: right:  0.5 cm mass at 3:00, nontender, not mobile, no nipple discharge and no palpable axillary masses or adenopathy   Left:   0.5 cm cyst at 6:00, mobile, tender, no nipple discharge and no palpable axillary masses or adenopathy  CV: regular rate and rhythm, normal S1 S2, no S3 or S4, no murmur, click or rub, no peripheral edema and peripheral pulses strong  ABDOMEN: soft, nontender, no hepatosplenomegaly, no masses and bowel sounds normal  MS: medial aspect of right great toe with tenderness upon palpation at the DIP joint, no " warmth/swelling/redness.   SKIN: no suspicious lesions or rashes  NEURO: Normal strength and tone, sensory exam grossly normal, mentation intact and speech normal  PSYCH: mentation appears normal and affect normal/bright        ASSESSMENT / PLAN:   Jeannine was seen today for wellness visit.    Diagnoses and all orders for this visit:    Encounter for Medicare annual wellness exam  -     Urine Microscopic    Great toe pain, right:  Xray appears normal, patient informed, will await final reading by radiologist. If xray and uric acid are normal and continues to have pain suggested calling for a referral to podiatry.  -     XR Toe Right G/E 2 Views  -     Uric acid    Pulmonary emphysema, unspecified emphysema type (H): allergy symptoms at this time, suggested adding zyrtec daily.  -     Discontinue: albuterol (PROAIR HFA/PROVENTIL HFA/VENTOLIN HFA) 108 (90 Base) MCG/ACT inhaler; Inhale 2 puffs into the lungs every 6 hours  -     albuterol (PROAIR HFA/PROVENTIL HFA/VENTOLIN HFA) 108 (90 Base) MCG/ACT inhaler; Inhale 2 puffs into the lungs every 6 hours    Urinary frequency: will obtain UA to ensure a UTI isn't present.   -     *UA reflex to Microscopic and Culture ([facility] (except [place] and [place])    Mild major depression (H):  PHQ 9 score of 13, VERÓNICA 7 of 18. Increased son with schizophrenia.  Denies thoughts of harming self or others, is interested in seeing the [facility]. Declines medication has been on in the past with side effects.     Vitamin D insufficiency  -     Vitamin D Deficiency    Elevated glucose  -     Hemoglobin A1c    Hypothyroidism, unspecified type: will refill levothyroxine after TSH is back  -     TSH with free T4 reflex    CARDIOVASCULAR SCREENING; LDL GOAL LESS THAN 130  -     Comprehensive metabolic panel  -     Lipid panel reflex to direct LDL Fasting    Protein-calorie malnutrition, unspecified severity (H):  Weight is consistently  lbs, is trying to increase her  "weight.  -     CBC with platelets differential    Breast mass: bilateral, has mammogram scheduled for 11/27/2020        Patient has been advised of split billing requirements and indicates understanding: Yes  COUNSELING:  Reviewed preventive health counseling, as reflected in patient instructions       Regular exercise       Healthy diet/nutrition       Osteoporosis Prevention/Bone Health       Colon cancer screening    Estimated body mass index is 16.6 kg/m  as calculated from the following:    Height as of this encounter: 1.638 m (5' 4.5\").    Weight as of this encounter: 44.5 kg (98 lb 3.2 oz).    Weight management plan noted, stable and monitoring    She reports that she has been smoking cigarettes. She has been smoking about 0.50 packs per day. She has never used smokeless tobacco.  Tobacco Cessation Action Plan:   Information offered: Patient not interested at this time      Appropriate preventive services were discussed with this patient, including applicable screening as appropriate for cardiovascular disease, diabetes, osteopenia/osteoporosis, and glaucoma.  As appropriate for age/gender, discussed screening for colorectal cancer, prostate cancer, breast cancer, and cervical cancer. Checklist reviewing preventive services available has been given to the patient.    Reviewed patients plan of care and provided an AVS. The Basic Care Plan (routine screening as documented in Health Maintenance) for Jeannine meets the Care Plan requirement. This Care Plan has been established and reviewed with the Patient.    Counseling Resources:  ATP IV Guidelines  Pooled Cohorts Equation Calculator  Breast Cancer Risk Calculator  Breast Cancer: Medication to Reduce Risk  FRAX Risk Assessment  ICSI Preventive Guidelines  Dietary Guidelines for Americans, 2010  USDA's MyPlate  ASA Prophylaxis  Lung CA Screening    Susan Haase, APRN [facility]    Identified Health Risks:  "

## 2020-11-10 NOTE — LETTER
[facility]  [address] [place], [address]  [phone]        November 16, 2020    Jeannine Carpio                                                                                                                                                       [address] [facility] [address]          Hi Jeannine,     Your lab results are as below:     1)  TSH (thyroid level) 3.39 which is normal (range 0.4-4)   2)  Cholesterol is normal at 183,  your LDL (bad cholesterol) is normal at 105 and your HDL (good cholesterol) is just lsightly low at 48.  Continue to take pravastatin 10 mg every day and we will recheck this in 1 year.   3)  Glucose is slightly elevated at 105 (normal fasting is <100).   4)  Vitamin D level is normal at 31.   5) Uric acid level (test for gout) is normal at 2.7. If your toe pain continues it would be a good idea to see a podiatrist.   6)  CBC (checks for anemia and infection) is normal.   7)  Urine is normal, no indication of an infection.   8)  A1C (test for diabetes) is 5.7%, this is in the range of prediabetes.  Just watch your intake of simple carbohydrates such as white rice, pasta and bread.     If you have any questions do not hesitate to call the clinic to discuss the results with me further.     Sincerely,       Susan Haase, CNP

## 2020-11-11 LAB — DEPRECATED CALCIDIOL+CALCIFEROL SERPL-MC: 31 UG/L (ref 20–75)

## 2020-11-11 NOTE — PROGRESS NOTES
"[facility]  Evaluator Name:  Swati Bruce     Credentials:  [facility]    PATIENT'S NAME: Jeannine Carpio  PREFERRED NAME: Jeannine  PRONOUNS:  she/her     MRN:   [id number]  :   1950   ACCT. NUMBER: [id number]  DATE OF SERVICE: 20  START TIME: 1230pm  END TIME: 120pm  PREFERRED PHONE: [phone]  May we leave a program related message: Yes  SERVICE MODALITY:  Phone Visit:    The patient has been notified of the following:      \"We have found that certain health care needs can be provided without the need for a face to face visit.  This service lets us provide the care you need with a phone conversation.       I will have full access to your [place] medical record during this entire phone call.   I will be taking notes for your medical record.      Since this is like an office visit, we will bill your insurance company for this service.       There are potential benefits and risks of telephone visits (e.g. limits to patient confidentiality) that differ from in-person visits.?  Confidentiality still applies for telephone services, and nobody will record the visit.  It is important to be in a quiet, private space that is free of distractions (including cell phone or other devices) during the visit.??      If during the course of the call I believe a telephone visit is not appropriate, you will not be charged for this service\"     Consent has been obtained for this service by care team member: Yes     UNIVERSAL ADULT DIAGNOSTIC ASSESSMENT      Identifying Information:  Patient is a 70 year old, .  The pronoun use throughout this assessment reflects the patient's chosen pronoun.  Patient was referred for an assessment by primary care provider.  Patient attended the session alone.     Chief Complaint:   The reason for seeking services at this time is: \"feels hyper. Can't still still\" Son who has alcoholism, bipolar and schizophrenia lives with her. Feels concerned for him. Anxious. " "  3 years ago.  The problem(s) began \"going on for quite awhile\". Patient has attempted to resolve these concerns in the past through counseling 2+ years ago. Goals: \"to help me figure out my purpose. Get past grieving. How to stay ok\".     Social/Family History:  Patient reported they grew up in [place], [place] and [facility] They were raised by biological parents.  Parents stayed . Both . Siblings: 1 brother and 3 sisters, all live in [place]. Patient reported that their childhood was fun.  Patient described their current relationships with family of origin as close with sister.      The patient describes their cultural background as white, middle class.  Cultural influences and impact on patient's life structure, values, norms, and healthcare: none identified.  Contextual influences on patient's health include: none identified    These factors will be addressed in the Preliminary Treatment plan.  Patient identified their preferred language to be English. Patient reported they does not need the assistance of an  or other support involved in therapy.     Patient reported had no significant delays in developmental tasks.   Patient's highest education level was high school graduate. Patient identified the following learning problems: none reported.  Modifications will not be used to assist communication in therapy.   Patient reports they are  able to understand written materials.    Patient reported the following relationship history  to first  for 25 years  because of his infidelity. Second marriage lasted 17 years. He  of [place] 3 years ago. Patient's current relationship status is  for 3 years.   Patient identified their sexual orientation as heterosexual.  Patient reported having six child(omar). Patient identified adult child as part of their support system.  Patient identified the quality of these relationships as good.      Patient's current " living/housing situation involves staying with daughter who bought home large enough for she and son..  They live with daughter and they report that housing is stable.     Patient is currently retired.  Patient reports their finances are obtained through [employer].  Patient does not identify finances as a current stressor.      Patient reported that they have not been involved with the legal system.   Patient denies being on probation / parole / under the jurisdiction of the court.    Patient's Strengths and Limitations:  Patient identified the following strengths or resources that will help them succeed in treatment: insight and motivation. Things that may interfere with the patient's success in treatment include: few friends.     Personal and Family Medical History:   Patient does not report a family history of mental health concerns.  Patient reports family history includes Alcoholism in her brother; Alzheimer Disease in her father; Arthritis in her mother; Cerebrovascular Disease in her mother; Depression in her sister; Diabetes in her mother; Eye Disorder in her father and mother; Heart Disease in her father and mother; Osteoporosis in her mother; Thyroid Disease in her brother, daughter, maternal grandmother, mother, and sister..     Patient does report Mental Health Diagnosis and/or Treatment.  Patient Patient reported the following previous diagnoses which include(s): Depression.  Patient reported symptoms began several years ago.   Patient has received mental health services in the past: outpt.  Psychiatric Hospitalizations: yes, 20-25 years ago following divorce. IOP Patient denies a history of civil commitment.  Currently, patient is not receiving other mental health services.  These include none.           Patient has had a physical exam to rule out medical causes for current symptoms.  Date of last physical exam was within the past year. Client was encouraged to follow up with PCP if symptoms were to  develop. The patient has a [facility] Primary Care Provider, who is named Haase, Susan Rachele..  Patient reports the following current medical concerns: emphysema.  There are not significant appetite / nutritional concerns / weight changes.   Patient does not report a history of head injury / trauma / cognitive impairment.      Patient reports current meds as:   Outpatient Medications Marked as Taking for the 20 encounter (Virtual Visit) with Swati Bruce LICSW   Medication Sig     albuterol (PROAIR HFA/PROVENTIL HFA/VENTOLIN HFA) 108 (90 Base) MCG/ACT inhaler Inhale 2 puffs into the lungs every 6 hours     Cholecalciferol (VITAMIN D) 2000 UNIT tablet Take 1,000 Units by mouth daily      levothyroxine (SYNTHROID/LEVOTHROID) 25 MCG tablet TAKE 1 TABLET (25 MCG) BY MOUTH DAILY     OMEPRAZOLE PO      pravastatin (PRAVACHOL) 10 MG tablet Take 1 tablet (10 mg) by mouth daily       Medication Adherence:  Patient reports taking prescribed medications as prescribed.    Patient Allergies:    Allergies   Allergen Reactions     Ibuprofen Hives     Penicillins Hives     Tetracyclines Hives       Medical History:    Past Medical History:   Diagnosis Date     Abnormal glandular Papanicolaou smear of cervix     cryo and LEEP done, normal since     Delivery normal      1 miscarriage     Menopause age 54         Current Mental Status Exam:   Appearance:  Unable to assess (phone)  Eye Contact:  Unable to assess (phone)  Psychomotor:  Unable to assess (phone)      Gait / station:  Unable to assess (phone)  Attitude / Demeanor: Cooperative   Speech      Rate / Production: Normal/ Responsive      Volume:  Normal  volume      Language:  intact  Mood:   Anxious  Depressed   Affect:   Appropriate    Thought Content: Clear   Thought Process: Coherent  Logical       Associations: No loosening of associations  Insight:   Good   Judgment:  Intact   Orientation:  All  Attention/concentration: Good    Rating Scales:    PHQ9:     PHQ-9 SCORE 1/18/2019 4/5/2019 10/13/2020   PHQ-9 Total Score - - -   PHQ-9 Total Score MyChart - 14 (Moderate depression) 13 (Moderate depression)   PHQ-9 Total Score 16 14 13   ;    GAD7:    VERÓNICA-7 SCORE 10/9/2018 4/5/2019 10/13/2020   Total Score - - -   Total Score 16 (severe anxiety) 16 (severe anxiety) 18 (severe anxiety)   Total Score 16 16 18     CGI:     First:Considering your total clinical experience with this particular patient population, how severe are the patient's symptoms at this time?: 4 (11/12/2020  1:12 PM)  ;    Most recentNo data recorded    Substance Use:  Patient did report a family history of substance use concerns; see medical history section for details.  Patient has not received chemical dependency treatment in the past.  Patient has not ever been to detox.      Patient is not currently receiving any chemical dependency treatment. Patient reported the following problems as a result of their substance use: none.    Patient denies using alcohol.  Patient denies using tobacco.  Patient denies using marijuana.  Patient denies using caffeine.  Patient reports using/abusing the following substance(s). Patient reported no other substance use.     CAGE- AID:    CAGE-AID Total Score 11/12/2020   Total Score 0       Substance Use: No symptoms    Based on the negative CAGE score and clinical interview there  are not indications of drug or alcohol abuse.      Significant Losses / Trauma / Abuse / Neglect Issues:   Patient did not serve in the .  There are indications or report of significant loss, trauma, abuse or neglect issues related to: are no indications and client denies any losses, trauma, abuse, or neglect concerns.  Concerns for possible neglect are not present.     Safety Assessment:   Current Safety Concerns:  [place] Suicide Severity Rating Scale (Lifetime/Recent)  [place] Suicide Severity Rating (Lifetime/Recent) 11/12/2020   1. Wish to be Dead (Lifetime) No   1. Wish to be  Dead (Recent) No   2. Non-Specific Active Suicidal Thoughts (Lifetime) No   2. Non-Specific Active Suicidal Thoughts (Recent) No   3. Active Suicidal Ideation with any Methods (Not Plan) Without Intent to Act (Lifetime) No   3. Active Sucidal Ideation with any Methods (Not Plan) Without Intent to Act (Recent) No   4. Active Suicidal Ideation with Some Intent to Act, Without Specific Plan (Lifetime) No   4. Active Suicidal Ideation with Some Intent to Act, Without Specific Plan (Recent) No   5. Active Suicidal Ideation with Specific Plan and Intent (Lifetime) No   5. Active Suicidal Ideation with Specific Plan and Intent (Recent) No   Most Severe Ideation Rating (Lifetime) NA   Frequency (Lifetime) NA   Duration (Lifetime) NA   Controllability (Lifetime) NA   Protective Factors  (Lifetime) NA   Reasons for Ideation (Lifetime) NA   Most Severe Ideation Rating (Past Month) NA   Frequency (Past Month) NA   Duration (Past Month) NA   Controllability (Past Month) NA   Protective Factors (Past Month) NA   Reasons for Ideation (Past Month) NA   Actual Attempt (Lifetime) No   Actual Attempt (Past 3 Months) No   Has subject engaged in non-suicidal self-injurious behavior? (Lifetime) No   Has subject engaged in non-suicidal self-injurious behavior? (Past 3 Months) No   Interrupted Attempts (Lifetime) No   Interrupted Attempts (Past 3 Months) No   Aborted or Self-Interrupted Attempt (Lifetime) No   Aborted or Self-Interrupted Attempt (Past 3 Months) No   Preparatory Acts or Behavior (Lifetime) No   Preparatory Acts or Behavior (Past 3 Months) No   Most Recent Attempt Actual Lethality Code NA   Most Lethal Attempt Actual Lethality Code NA   Initial/First Attempt Actual Lethality Code NA     Patient denies current homicidal ideation and behaviors.  Patient denies current self-injurious ideation and behaviors.    Patient denied risk behaviors associated with substance use.  Patient denies any high risk behaviors associated with  mental health symptoms.  Patient reports the following current concerns for their personal safety: None.  Patient reports there are  firearms in the house. The firearms are secured in a locked space.     History of Safety Concerns:  Patient denied a history of homicidal ideation.     Patient denied a history of personal safety concerns.    Patient denied a history of assaultive behaviors.    Patient denied a history of sexual assault behaviors.     Patient denied a history of risk behaviors associated with substance use.  Patient denies any history of high risk behaviors associated with mental health symptoms.  Patient reports the following protective factors: positive relationships positive social network and positive family connections, forward/future oriented thinking, dedication to family/friends, positive social skills and financial stability    Risk Plan:  See Recommendations for Safety and Risk Management Plan    Review of Symptoms per patient report:  Depression: Change in sleep, Lack of interest, Excessive or inappropriate guilt, Change in energy level, Difficulties concentrating, Low self-worth, Ruminations, Feeling sad, down, or depressed and Frequent crying  [place]:  No Symptoms  Psychosis: No Symptoms  Anxiety: Excessive worry, Nervousness, Physical complaints, such as headaches, stomachaches, muscle tension, Sleep disturbance, Ruminations and Poor concentration  Panic:  Palpitations  Post Traumatic Stress Disorder:  No Symptoms   Eating Disorder: No Symptoms  ADD / ADHD:  Inattentive, Difficulties listening, Distractibility and Restlessness/fidgety  Conduct Disorder: No symptoms  Autism Spectrum Disorder: No symptoms  Obsessive Compulsive Disorder: No Symptoms    Patient reports the following compulsive behaviors and treatment history: none.      Diagnostic Criteria:   A. Excessive anxiety and worry about a number of events or activities (such as work or school performance).   B. The person finds it  difficult to control the worry.  C. Select 3 or more symptoms (required for diagnosis). Only one item is required in children.   - Restlessness or feeling keyed up or on edge.    - Being easily fatigued.    - Difficulty concentrating or mind going blank.    - Sleep disturbance (difficulty falling or staying asleep, or restless unsatisfying sleep).   D. The focus of the anxiety and worry is not confined to features of an Axis I disorder.  E. The anxiety, worry, or physical symptoms cause clinically significant distress or impairment in social, occupational, or other important areas of functioning.   F. The disturbance is not due to the direct physiological effects of a substance (e.g., a drug of abuse, a medication) or a general medical condition (e.g., hyperthyroidism) and does not occur exclusively during a Mood Disorder, a Psychotic Disorder, or a Pervasive Developmental Disorder.  CRITERIA (A-C) REPRESENT A MAJOR DEPRESSIVE EPISODE - SELECT THESE CRITERIA  A) Recurrent episode(s) - symptoms have been present during the same 2-week period and represent a change from previous functioning 5 or more symptoms (required for diagnosis)   - Depressed mood. Note: In children and adolescents, can be irritable mood.     - Diminished interest or pleasure in all, or almost all, activities.    - Significant weight gaindecrease in appetite.    - Fatigue or loss of energy.    - Diminished ability to think or concentrate, or indecisiveness.   B) The symptoms cause clinically significant distress or impairment in social, occupational, or other important areas of functioning  C) The episode is not attributable to the physiological effects of a substance or to another medical condition  D) The occurence of major depressive episode is not better explained by other thought / psychotic disorders  E) There has never been a manic episode or hypomanic episode    Functional Status:  Patient reports the following functional impairments:  social interactions.     WHODAS:   WHODAS 2.0 Total Score 11/12/2020   Total Score 12       Clinical Summary:  1. Reason for assessment: depression and anxiety  .  2. Psychosocial, Cultural and Contextual Factors: caring for son who has mental illness, death of  3 years ago  .  3. Principal DSM5 Diagnoses  (Sustained by DSM5 Criteria Listed Above):   296.32 (F33.1) Major Depressive Disorder, Recurrent Episode, Moderate _ and With anxious distress.  4. Other Diagnoses that is relevant to services:   300.02 (F41.1) Generalized Anxiety Disorder.  5. Provisional Diagnosis:  296.32 (F33.1) Major Depressive Disorder, Recurrent Episode, Moderate _ and With anxious distress  300.02 (F41.1) Generalized Anxiety Disorder as evidenced by see ROS .  6. Prognosis: Return to Normal Functioning.  7. Likely consequences of symptoms if not treated: worsening symptoms.  8. Client strengths include:  caring, empathetic, motivated, responsible parent and support of family, friends and providers .     Recommendations:     1. Plan for Safety and Risk Management:   Recommended that patient call 911 or go to the local ED should there be a change in any of these risk factors..          Report to child / adult protection services was NA.     2. Patient's identified mental health concerns with a cultural influence will be addressed by none identified.     3. Initial Treatment will focus on:    Depressed Mood - moderate.     4. Resources/Service Plan:    services are not indicated.   Modifications to assist communication are not indicated.   Additional disability accommodations are not indicated.      5. Collaboration:   Collaboration / coordination of treatment will be initiated with the following  support professionals: none.      6.  Referrals:   The following referral(s) will be initiated: [facility]patient [facility]. Next Scheduled Appointment: 11/19/2020.     A Release of Information has been obtained for the  following: none identified.    7. RONAK:    RONAK:  Discussed the general effects of drugs and alcohol on health and well-being. Provider gave patient printed information about the effects of chemical use on their health and well being. Recommendations:  None .     8. Records:    were not available for review at time of assessment.   Information in this assessment was obtained from the medical record and  provided by patient who is a good historian.    Patient will have open access to their mental health medical record.      Eval type:  Mental Health    Provider Name/ Credentials:  Swati Bruce [facility]  November 12, 2020

## 2020-11-12 ENCOUNTER — VIRTUAL VISIT (OUTPATIENT)
Dept: BEHAVIORAL HEALTH | Facility: CLINIC | Age: 70
End: 2020-11-12
Payer: MEDICARE

## 2020-11-12 DIAGNOSIS — F33.1 DEPRESSION, MAJOR, RECURRENT, MODERATE (H): Primary | ICD-10-CM

## 2020-11-12 DIAGNOSIS — F41.1 GENERALIZED ANXIETY DISORDER: ICD-10-CM

## 2020-11-12 PROCEDURE — 90791 PSYCH DIAGNOSTIC EVALUATION: CPT | Mod: 95 | Performed by: SOCIAL WORKER

## 2020-11-12 ASSESSMENT — COLUMBIA-SUICIDE SEVERITY RATING SCALE - C-SSRS
1. IN THE PAST MONTH, HAVE YOU WISHED YOU WERE DEAD OR WISHED YOU COULD GO TO SLEEP AND NOT WAKE UP?: NO
TOTAL  NUMBER OF INTERRUPTED ATTEMPTS PAST 3 MONTHS: NO
ATTEMPT PAST THREE MONTHS: NO
4. HAVE YOU HAD THESE THOUGHTS AND HAD SOME INTENTION OF ACTING ON THEM?: NO
6. HAVE YOU EVER DONE ANYTHING, STARTED TO DO ANYTHING, OR PREPARED TO DO ANYTHING TO END YOUR LIFE?: NO
2. HAVE YOU ACTUALLY HAD ANY THOUGHTS OF KILLING YOURSELF?: NO
6. HAVE YOU EVER DONE ANYTHING, STARTED TO DO ANYTHING, OR PREPARED TO DO ANYTHING TO END YOUR LIFE?: NO
ATTEMPT LIFETIME: NO
4. HAVE YOU HAD THESE THOUGHTS AND HAD SOME INTENTION OF ACTING ON THEM?: NO
TOTAL  NUMBER OF ABORTED OR SELF INTERRUPTED ATTEMPTS PAST 3 MONTHS: NO
3. HAVE YOU BEEN THINKING ABOUT HOW YOU MIGHT KILL YOURSELF?: NO
1. IN THE PAST MONTH, HAVE YOU WISHED YOU WERE DEAD OR WISHED YOU COULD GO TO SLEEP AND NOT WAKE UP?: NO
5. HAVE YOU STARTED TO WORK OUT OR WORKED OUT THE DETAILS OF HOW TO KILL YOURSELF? DO YOU INTEND TO CARRY OUT THIS PLAN?: NO
TOTAL  NUMBER OF ABORTED OR SELF INTERRUPTED ATTEMPTS PAST LIFETIME: NO
2. HAVE YOU ACTUALLY HAD ANY THOUGHTS OF KILLING YOURSELF LIFETIME?: NO
TOTAL  NUMBER OF INTERRUPTED ATTEMPTS LIFETIME: NO
5. HAVE YOU STARTED TO WORK OUT OR WORKED OUT THE DETAILS OF HOW TO KILL YOURSELF? DO YOU INTEND TO CARRY OUT THIS PLAN?: NO

## 2020-11-12 NOTE — PROGRESS NOTES
Treatment Plan    Client's Name: Jeannine Carpio  YOB: 1950    Date: 11/12/2020    DSM-V Diagnoses: 296.32 (F33.1) Major Depressive Disorder, Recurrent Episode, Moderate _ and With anxious distress  Psychosocial / Contextual Factors: caring for son who has mental illness and death of [place] 3 years ago  WHODAS: 12    Referral / Collaboration:  The following referral(s) will be initiated: [facility].    Anticipated number of session or this episode of care: 3-6      MeasurableTreatment Goal(s) related to diagnosis / functional impairment(s)  Goal 1: Client will experience improved depression   met goal when sleep has improved    Objective #A (Client Action)    Client will Increase interest, engagement, and pleasure in doing things  Decrease frequency and intensity of feeling down, depressed, hopeless  Improve quantity and quality of night time sleep / decrease daytime naps  Feel less tired and more energy during the day   Improve diet, appetite, mindful eating, and / or meal planning  Identify negative self-talk and behaviors: challenge core beliefs, myths, and actions  Improve concentration, focus, and mindfulness in daily activities   Feel less fidgety, restless or slow in daily activities / interpersonal interactions.  Status: Continued - Date(s): 02/21/2021     Intervention(s)  Therapist will assign homework to facilitate growth toward goal  make referrals to outpatient therapist.        Patient has reviewed and agreed to the above plan.      PB Srinivasan  November 12, 2020

## 2020-11-17 DIAGNOSIS — Z12.11 COLON CANCER SCREENING: ICD-10-CM

## 2020-11-17 PROCEDURE — 82274 ASSAY TEST FOR BLOOD FECAL: CPT | Performed by: NURSE PRACTITIONER

## 2020-11-19 ENCOUNTER — VIRTUAL VISIT (OUTPATIENT)
Dept: BEHAVIORAL HEALTH | Facility: CLINIC | Age: 70
End: 2020-11-19
Payer: MEDICARE

## 2020-11-19 DIAGNOSIS — F33.1 DEPRESSION, MAJOR, RECURRENT, MODERATE (H): Primary | ICD-10-CM

## 2020-11-19 DIAGNOSIS — F41.1 GENERALIZED ANXIETY DISORDER: ICD-10-CM

## 2020-11-19 PROCEDURE — 90832 PSYTX W PT 30 MINUTES: CPT | Mod: 95 | Performed by: SOCIAL WORKER

## 2020-11-19 NOTE — PROGRESS NOTES
[facility] Primary Care: Integrated Behavioral Health  2020      Behavioral Health Clinician Progress Note    Patient Name: Jeannine Carpio           Service Type:  Individual      Service Location:   Phone call (patient / identified key support person reached)     Session Start Time: 200pm  Session End Time: 227pm      Session Length: 16 - 37      Attendees: Client    Visit Activities (Refresh list every visit): [facility] Only    Diagnostic Assessment Date: 2020  Treatment Plan Review Date: 2021  See Flowsheets for today's PHQ-9 and VERÓNICA-7 results  Previous PHQ-9:   PHQ-9 SCORE 2019 2019 10/13/2020   PHQ-9 Total Score - - -   PHQ-9 Total Score MyChart - 14 (Moderate depression) 13 (Moderate depression)   PHQ-9 Total Score 16 14 13     Previous VERÓNICA-7:   VERÓNICA-7 SCORE 10/9/2018 2019 10/13/2020   Total Score - - -   Total Score 16 (severe anxiety) 16 (severe anxiety) 18 (severe anxiety)   Total Score 16 16 18       HENRY LEVEL:  HENRY Score (Last Two) 2011 10/13/2020   HENRY Raw Score 37 30   Activation Score 49.9 56   HENRY Level 2 3       DATA  Extended Session (60+ minutes): No  Interactive Complexity: No  Crisis: No  [facility] Patient: No    Treatment Objective(s) Addressed in This Session:  Target Behavior(s): manage anxiety and depression    Anxiety: will experience a reduction in anxiety    Current Stressors / Issues:  No changes in anxiety or depression. Ongoing stress around husbands death and son's illness. He will not allow family to be involved in care. Reviewed stress mgt strategies: spiritual practices, has good support system, spends time with family. Given information on local grief support organizations and JOSHUA for son. Explored loneliness since  . She provided much of his home care. Educated about grief process and explored where she is. Explored rituals that family engages in to memorialize him.  Has some sense of purpose in taking care of son and  helping with grandchildren.       Progress on Treatment Objective(s) / Homework:  No improvement - ACTION (Actively working towards change); Intervened by reinforcing change plan / affirming steps taken    Given resource information    Care Plan review completed: No    Medication Review:  No changes to current psychiatric medication(s)    Medication Compliance:  Yes    Changes in Health Issues:   None reported    Chemical Use Review:   Substance Use: Chemical use reviewed, no active concerns identified      Tobacco Use: No current tobacco use.      Assessment: Current Emotional / Mental Status (status of significant symptoms):  Risk status (Self / Other harm or suicidal ideation)  Patient denies a history of suicidal ideation, suicide attempts, self-injurious behavior, homicidal ideation, homicidal behavior and and other safety concerns  Patient denies current fears or concerns for personal safety.  Patient denies current or recent suicidal ideation or behaviors.  Patient denies current or recent homicidal ideation or behaviors.  Patient denies current or recent self injurious behavior or ideation.  Patient denies other safety concerns.  A safety and risk management plan has not been developed at this time, however patient was encouraged to call [place] [phone] should there be a change in any of these risk factors.    Appearance:   Unable to assess (phone)  Eye Contact:   Unable to assess (phone)  Psychomotor Behavior: Unable to assess (phone)  Attitude:   Cooperative   Orientation:   All  Speech   Rate / Production: Normal    Volume:  Normal   Mood:    Normal  Affect:    Appropriate   Thought Content:  Clear   Thought Form:  Coherent  Logical   Insight:    Good     Diagnoses:  1. Depression, major, recurrent, moderate (H)    2. Generalized anxiety disorder        Collateral Reports Completed:  Not Applicable    Plan: (Homework, other):  Patient was given information about behavioral services and encouraged to  schedule a follow up appointment with the clinic [facility] in 2 weeks.  She was also given information about grief counseling and JOSHUA.  CD Recommendations: No indications of CD issues.  Swati Bruce [facility]      ______________________________________________________________________    Integrated Primary Care Behavioral Health Treatment Plan                                           Treatment Plan    Client's Name: Jeannine Carpio  YOB: 1950    Date: 11/12/2020    DSM-V Diagnoses: 296.32 (F33.1) Major Depressive Disorder, Recurrent Episode, Moderate _ and With anxious distress  Psychosocial / Contextual Factors: caring for son who has mental illness and death of husand 3 years ago  WHODAS: 14    Referral / Collaboration:  The following referral(s) will be initiated: [facility].    Anticipated number of session or this episode of care: 3-6      MeasurableTreatment Goal(s) related to diagnosis / functional impairment(s)  Goal 1: Client will experience improved depression   met goal when sleep has improved    Objective #A (Client Action)    Client will Increase interest, engagement, and pleasure in doing things  Decrease frequency and intensity of feeling down, depressed, hopeless  Improve quantity and quality of night time sleep / decrease daytime naps  Feel less tired and more energy during the day   Improve diet, appetite, mindful eating, and / or meal planning  Identify negative self-talk and behaviors: challenge core beliefs, myths, and actions  Improve concentration, focus, and mindfulness in daily activities   Feel less fidgety, restless or slow in daily activities / interpersonal interactions.  Status: Continued - Date(s): 02/21/2021     Intervention(s)  Therapist will assign homework to facilitate growth toward goal  make referrals to outpatient therapist.        Patient has reviewed and agreed to the above plan.      Swati Bruce, [facility]  November 19, 2020

## 2020-11-22 LAB — HEMOCCULT STL QL IA: NEGATIVE

## 2020-11-27 ENCOUNTER — HOSPITAL ENCOUNTER (OUTPATIENT)
Dept: ULTRASOUND IMAGING | Facility: CLINIC | Age: 70
End: 2020-11-27
Attending: NURSE PRACTITIONER
Payer: MEDICARE

## 2020-11-27 ENCOUNTER — HOSPITAL ENCOUNTER (OUTPATIENT)
Dept: MAMMOGRAPHY | Facility: CLINIC | Age: 70
End: 2020-11-27
Attending: NURSE PRACTITIONER
Payer: MEDICARE

## 2020-11-27 DIAGNOSIS — Z12.31 ENCOUNTER FOR SCREENING MAMMOGRAM FOR BREAST CANCER: ICD-10-CM

## 2020-11-27 DIAGNOSIS — N63.0 LUMP OR MASS IN BREAST: ICD-10-CM

## 2020-11-27 DIAGNOSIS — N63.15 BREAST LUMP ON RIGHT SIDE AT 3 O'CLOCK POSITION: ICD-10-CM

## 2020-11-27 PROCEDURE — 76642 ULTRASOUND BREAST LIMITED: CPT | Mod: 50

## 2020-11-27 PROCEDURE — 77066 DX MAMMO INCL CAD BI: CPT

## 2020-12-29 DIAGNOSIS — E78.00 HYPERCHOLESTEROLEMIA: ICD-10-CM

## 2020-12-30 RX ORDER — PRAVASTATIN SODIUM 10 MG
10 TABLET ORAL DAILY
Qty: 90 TABLET | Refills: 2 | Status: SHIPPED | OUTPATIENT
Start: 2020-12-30 | End: 2021-09-14

## 2020-12-30 NOTE — TELEPHONE ENCOUNTER
Prescription approved per [facility] Refill Protocol.    Patsy Ellsworth RN

## 2021-01-19 DIAGNOSIS — E03.9 HYPOTHYROIDISM, UNSPECIFIED TYPE: ICD-10-CM

## 2021-01-19 RX ORDER — LEVOTHYROXINE SODIUM 25 UG/1
25 TABLET ORAL DAILY
Qty: 90 TABLET | Refills: 2 | Status: SHIPPED | OUTPATIENT
Start: 2021-01-19 | End: 2021-10-08

## 2021-01-19 NOTE — TELEPHONE ENCOUNTER
Prescription approved per [facility].    Patsy Ellsworth RN

## 2021-03-14 ENCOUNTER — HEALTH MAINTENANCE LETTER (OUTPATIENT)
Age: 71
End: 2021-03-14

## 2021-04-20 ENCOUNTER — TELEPHONE (OUTPATIENT)
Dept: FAMILY MEDICINE | Facility: CLINIC | Age: 71
End: 2021-04-20

## 2021-04-20 NOTE — TELEPHONE ENCOUNTER
Patient Quality Outreach      Summary:    Patient has the following on her problem list/HM:   Depression / Dysthymia review    6 Month Remission: 4-8 month window range: 2/12/2021-6/12/2021         PHQ-9 SCORE 1/18/2019 4/5/2019 10/13/2020   PHQ-9 Total Score - - -   PHQ-9 Total Score MyChart - 14 (Moderate depression) 13 (Moderate depression)   PHQ-9 Total Score 16 14 13       If PHQ-9 recheck is 5 or more, route to provider for next steps.    Patient is due/failing the following:   PHQ-9 Needed    Type of outreach:    Sent [employer] message.    Questions for provider review:    None                                                                                                                                     Jennifer Castro       Chart routed to Care Team.

## 2021-07-05 ENCOUNTER — NURSE TRIAGE (OUTPATIENT)
Dept: FAMILY MEDICINE | Facility: CLINIC | Age: 71
End: 2021-07-05

## 2021-09-14 DIAGNOSIS — E78.00 HYPERCHOLESTEROLEMIA: ICD-10-CM

## 2021-09-14 RX ORDER — PRAVASTATIN SODIUM 10 MG
10 TABLET ORAL DAILY
Qty: 90 TABLET | Refills: 0 | Status: SHIPPED | OUTPATIENT
Start: 2021-09-14 | End: 2021-11-14

## 2021-10-07 DIAGNOSIS — E03.9 HYPOTHYROIDISM, UNSPECIFIED TYPE: ICD-10-CM

## 2021-10-08 ENCOUNTER — TELEPHONE (OUTPATIENT)
Dept: FAMILY MEDICINE | Facility: CLINIC | Age: 71
End: 2021-10-08

## 2021-10-08 RX ORDER — LEVOTHYROXINE SODIUM 25 UG/1
25 TABLET ORAL DAILY
Qty: 90 TABLET | Refills: 0 | Status: SHIPPED | OUTPATIENT
Start: 2021-10-08 | End: 2021-11-14

## 2021-10-08 NOTE — TELEPHONE ENCOUNTER
Reason for Call:  Same Day Appointment, Requested Provider:  any    PCP: Haase, Susan Rachele    Reason for visit: patient hurt her back last Monday    Duration of symptoms: last monday    Have you been treated for this in the past? No    Additional comments: Is wondering if anyone would work her in today     Can we leave a detailed message on this number? YES    Phone number patient can be reached at: Home number on file [phone] (home)    Best Time: any    Call taken on 10/8/2021 at 8:21 AM by Radha Li

## 2021-10-13 ENCOUNTER — ANCILLARY PROCEDURE (OUTPATIENT)
Dept: GENERAL RADIOLOGY | Facility: CLINIC | Age: 71
End: 2021-10-13
Attending: PHYSICIAN ASSISTANT
Payer: MEDICARE

## 2021-10-13 ENCOUNTER — OFFICE VISIT (OUTPATIENT)
Dept: FAMILY MEDICINE | Facility: CLINIC | Age: 71
End: 2021-10-13
Payer: MEDICARE

## 2021-10-13 VITALS
DIASTOLIC BLOOD PRESSURE: 80 MMHG | BODY MASS INDEX: 16.46 KG/M2 | HEART RATE: 106 BPM | RESPIRATION RATE: 16 BRPM | OXYGEN SATURATION: 99 % | WEIGHT: 97.4 LBS | SYSTOLIC BLOOD PRESSURE: 144 MMHG

## 2021-10-13 DIAGNOSIS — H93.13 TINNITUS, BILATERAL: ICD-10-CM

## 2021-10-13 DIAGNOSIS — M54.42 ACUTE MIDLINE LOW BACK PAIN WITH LEFT-SIDED SCIATICA: ICD-10-CM

## 2021-10-13 DIAGNOSIS — M54.42 ACUTE MIDLINE LOW BACK PAIN WITH LEFT-SIDED SCIATICA: Primary | ICD-10-CM

## 2021-10-13 PROCEDURE — 72100 X-RAY EXAM L-S SPINE 2/3 VWS: CPT | Performed by: RADIOLOGY

## 2021-10-13 PROCEDURE — 99213 OFFICE O/P EST LOW 20 MIN: CPT | Performed by: PHYSICIAN ASSISTANT

## 2021-10-13 ASSESSMENT — PATIENT HEALTH QUESTIONNAIRE - PHQ9: SUM OF ALL RESPONSES TO PHQ QUESTIONS 1-9: 13

## 2021-10-13 NOTE — PROGRESS NOTES
Assessment & Plan     Acute midline low back pain with left-sided sciatica  X-ray obtained due to midline tenderness. No trauma that would suggest fracture and x-ray does not reveal that either.  Will start with conservative treatment with PT. If not improving MRI should be ordered for further evaluation.  - XR Lumbar Spine 2/3 Views; Future  - PAUOL PT and Hand Referral; Future    Tinnitus, bilateral  Continue to monitor. Has annual with PCP in 1 month.      Review of the result(s) of each unique test - x-ray lumbar  Ordering of each unique test    Tobacco Cessation:   reports that she has been smoking cigarettes. She has been smoking about 0.50 packs per day. She has never used smokeless tobacco.  Tobacco Cessation Action Plan: Information offered: Patient not interested at this time    Follow up as planned with PCP in 1 month.    DALILA Merrill [facility]    Phone call and letter    Subjective   Jeannine is a 71 year old who presents for the following health issues    HPI     Back Pain  Onset/Duration: X1 week   Description:   Location of pain: low back, more on the left side is more tender. When she pushes on the spot where it is more tender the pain shoots across her low back and down the left leg.   Character of pain: stabbing  Pain radiation: radiates into the left buttocks and radiates into the left leg. Patient states that she is getting headaches from it. And the neck hurts  New numbness or weakness in legs, not attributed to pain: no   Intensity: At its worst 8/10  Progression of Symptoms: same  History:   Specific cause: Put a bag in the back seat and she screamed out loud thinks maytbe she twisted wrong  Pain interferes with job: not applicable  History of back problems: previous herniated disc and spinal stenosis  Any previous MRI or X-rays: Yes- at [facility] (x-ray and MRI).  Date 11/12/2010- she had some PT at that time.  Sees a specialist for back pain:  No  Alleviating factors:   Improved by: Nothing     Precipitating factors:  Worsened by: Sitting, Walking and certain movements (turning over in bed). Worse in the morning  Therapies tried and outcome: acetaminophen (Tylenol), cold, heat and rest    Accompanying Signs & Symptoms:  Risk of Fracture: None  Risk of Cauda Equina: None  Risk of Infection: None  Risk of Cancer: None  Risk of Ankylosing Spondylitis: Onset at age <35, male, AND morning back stiffness no    Patient states that she has tinnitus and Sunday the ringing got really loud and then she could not hear for about 15 mins.  Patient states that it is getting better. Patient feels like it is happening more at night. But is not like is was before the ringing started.    Review of Systems   GENERAL:  No fevers  MUSCULOSKELETAL: As noted in HPI          Objective    BP (!) 144/80   Pulse 106   Resp 16   Wt 44.2 kg (97 lb 6.4 oz)   SpO2 99%   Breastfeeding No   BMI 16.46 kg/m    Body mass index is 16.46 kg/m .  Physical Exam   GENERAL: No acute distress  HEENT: Normocephalic  EXTREMITIES: No midline tenderness over the cervical or thoracic. Tenderness along the midline lumbar spine. No tenderness over the right lumbar paraspinous muscles. No tenderness over the left lumbar paraspinous muscles.  Right lower extremity: 5/5 strength with flexion of the knee, 5/5 strength with extension of the knee, 5/5 strength with flexion of the hip, 5/5 strength with dorsiflexion, 5/5 strength with plantar flexion. Straight leg raise positive with pain on the left back. Patellar reflex 2+. Achilles reflex 2+.  Left lower extremity: 5/5 strength with flexion of the knee, 5/5 strength with extension of the knee, 5/5 strength with flexion of the hip, 5/5 strength with dorsiflexion, 5/5 strength with plantar flexion. Straight leg raise negative. Patellar reflex 2+. Achilles reflex 2+.  NEURO: Alert, non-focal      LUMBAR SPINE TWO TO THREE VIEWS   10/13/2021 9:38 AM       HISTORY: Acute midline low back pain with left-sided sciatica.     COMPARISON: 3/5/2010                                                                      IMPRESSION: Vertebral body heights are maintained. Posterior alignment  is normal. Mild multilevel degenerative facet disease is present.  Vascular calcifications noted.

## 2021-10-24 ENCOUNTER — HEALTH MAINTENANCE LETTER (OUTPATIENT)
Age: 71
End: 2021-10-24

## 2021-11-11 ENCOUNTER — OFFICE VISIT (OUTPATIENT)
Dept: FAMILY MEDICINE | Facility: CLINIC | Age: 71
End: 2021-11-11
Payer: MEDICARE

## 2021-11-11 VITALS
HEIGHT: 64 IN | WEIGHT: 98.8 LBS | OXYGEN SATURATION: 99 % | HEART RATE: 114 BPM | DIASTOLIC BLOOD PRESSURE: 86 MMHG | TEMPERATURE: 98.2 F | SYSTOLIC BLOOD PRESSURE: 132 MMHG | BODY MASS INDEX: 16.87 KG/M2 | RESPIRATION RATE: 18 BRPM

## 2021-11-11 DIAGNOSIS — F32.0 MILD MAJOR DEPRESSION (H): ICD-10-CM

## 2021-11-11 DIAGNOSIS — Z00.00 ENCOUNTER FOR MEDICARE ANNUAL WELLNESS EXAM: Primary | ICD-10-CM

## 2021-11-11 DIAGNOSIS — E03.9 HYPOTHYROIDISM, UNSPECIFIED TYPE: ICD-10-CM

## 2021-11-11 DIAGNOSIS — F41.9 ANXIETY: ICD-10-CM

## 2021-11-11 DIAGNOSIS — B35.1 ONYCHOMYCOSIS: ICD-10-CM

## 2021-11-11 DIAGNOSIS — J43.9 PULMONARY EMPHYSEMA, UNSPECIFIED EMPHYSEMA TYPE (H): ICD-10-CM

## 2021-11-11 DIAGNOSIS — E46 PROTEIN-CALORIE MALNUTRITION, UNSPECIFIED SEVERITY (H): ICD-10-CM

## 2021-11-11 LAB
BASOPHILS # BLD AUTO: 0 10E3/UL (ref 0–0.2)
BASOPHILS NFR BLD AUTO: 1 %
EOSINOPHIL # BLD AUTO: 0.3 10E3/UL (ref 0–0.7)
EOSINOPHIL NFR BLD AUTO: 4 %
ERYTHROCYTE [DISTWIDTH] IN BLOOD BY AUTOMATED COUNT: 13.1 % (ref 10–15)
HCT VFR BLD AUTO: 46.8 % (ref 35–47)
HGB BLD-MCNC: 15.8 G/DL (ref 11.7–15.7)
LYMPHOCYTES # BLD AUTO: 1.4 10E3/UL (ref 0.8–5.3)
LYMPHOCYTES NFR BLD AUTO: 20 %
MCH RBC QN AUTO: 30.4 PG (ref 26.5–33)
MCHC RBC AUTO-ENTMCNC: 33.8 G/DL (ref 31.5–36.5)
MCV RBC AUTO: 90 FL (ref 78–100)
MONOCYTES # BLD AUTO: 0.7 10E3/UL (ref 0–1.3)
MONOCYTES NFR BLD AUTO: 10 %
NEUTROPHILS # BLD AUTO: 4.6 10E3/UL (ref 1.6–8.3)
NEUTROPHILS NFR BLD AUTO: 65 %
PLATELET # BLD AUTO: 228 10E3/UL (ref 150–450)
RBC # BLD AUTO: 5.19 10E6/UL (ref 3.8–5.2)
WBC # BLD AUTO: 7 10E3/UL (ref 4–11)

## 2021-11-11 PROCEDURE — G0439 PPPS, SUBSEQ VISIT: HCPCS | Performed by: NURSE PRACTITIONER

## 2021-11-11 PROCEDURE — 80053 COMPREHEN METABOLIC PANEL: CPT | Performed by: NURSE PRACTITIONER

## 2021-11-11 PROCEDURE — 85025 COMPLETE CBC W/AUTO DIFF WBC: CPT | Performed by: NURSE PRACTITIONER

## 2021-11-11 PROCEDURE — 80061 LIPID PANEL: CPT | Performed by: NURSE PRACTITIONER

## 2021-11-11 PROCEDURE — 84443 ASSAY THYROID STIM HORMONE: CPT | Performed by: NURSE PRACTITIONER

## 2021-11-11 PROCEDURE — 99214 OFFICE O/P EST MOD 30 MIN: CPT | Mod: 25 | Performed by: NURSE PRACTITIONER

## 2021-11-11 PROCEDURE — 36415 COLL VENOUS BLD VENIPUNCTURE: CPT | Performed by: NURSE PRACTITIONER

## 2021-11-11 RX ORDER — PROPRANOLOL HCL 60 MG
60 CAPSULE, EXTENDED RELEASE 24HR ORAL DAILY
Qty: 30 CAPSULE | Refills: 3 | Status: SHIPPED | OUTPATIENT
Start: 2021-11-11 | End: 2022-09-13

## 2021-11-11 SDOH — ECONOMIC STABILITY: FOOD INSECURITY: WITHIN THE PAST 12 MONTHS, THE FOOD YOU BOUGHT JUST DIDN'T LAST AND YOU DIDN'T HAVE MONEY TO GET MORE.: NEVER TRUE

## 2021-11-11 SDOH — HEALTH STABILITY: PHYSICAL HEALTH: ON AVERAGE, HOW MANY DAYS PER WEEK DO YOU ENGAGE IN MODERATE TO STRENUOUS EXERCISE (LIKE A BRISK WALK)?: 2 DAYS

## 2021-11-11 SDOH — ECONOMIC STABILITY: FOOD INSECURITY: WITHIN THE PAST 12 MONTHS, YOU WORRIED THAT YOUR FOOD WOULD RUN OUT BEFORE YOU GOT MONEY TO BUY MORE.: PATIENT DECLINED

## 2021-11-11 SDOH — HEALTH STABILITY: PHYSICAL HEALTH: ON AVERAGE, HOW MANY MINUTES DO YOU ENGAGE IN EXERCISE AT THIS LEVEL?: 30 MIN

## 2021-11-11 SDOH — ECONOMIC STABILITY: INCOME INSECURITY: HOW HARD IS IT FOR YOU TO PAY FOR THE VERY BASICS LIKE FOOD, HOUSING, MEDICAL CARE, AND HEATING?: NOT VERY HARD

## 2021-11-11 SDOH — ECONOMIC STABILITY: INCOME INSECURITY: IN THE LAST 12 MONTHS, WAS THERE A TIME WHEN YOU WERE NOT ABLE TO PAY THE MORTGAGE OR RENT ON TIME?: NO

## 2021-11-11 SDOH — ECONOMIC STABILITY: TRANSPORTATION INSECURITY
IN THE PAST 12 MONTHS, HAS LACK OF TRANSPORTATION KEPT YOU FROM MEETINGS, WORK, OR FROM GETTING THINGS NEEDED FOR DAILY LIVING?: NO

## 2021-11-11 ASSESSMENT — ENCOUNTER SYMPTOMS
BREAST MASS: 0
DYSURIA: 0
CONSTIPATION: 0
EYE PAIN: 1
HEADACHES: 1
DIARRHEA: 0
JOINT SWELLING: 1
HEARTBURN: 1
WEAKNESS: 0
PALPITATIONS: 1
ARTHRALGIAS: 1
COUGH: 1
HEMATURIA: 0
MYALGIAS: 1
ABDOMINAL PAIN: 0
FEVER: 0
FREQUENCY: 0
HEMATOCHEZIA: 0
NAUSEA: 1
CHILLS: 0
SHORTNESS OF BREATH: 1
PARESTHESIAS: 0
NERVOUS/ANXIOUS: 1
SORE THROAT: 0

## 2021-11-11 ASSESSMENT — ACTIVITIES OF DAILY LIVING (ADL): CURRENT_FUNCTION: NO ASSISTANCE NEEDED

## 2021-11-11 ASSESSMENT — SOCIAL DETERMINANTS OF HEALTH (SDOH)
HOW OFTEN DO YOU ATTEND CHURCH OR RELIGIOUS SERVICES?: MORE THAN 4 TIMES PER YEAR
IN A TYPICAL WEEK, HOW MANY TIMES DO YOU TALK ON THE PHONE WITH FAMILY, FRIENDS, OR NEIGHBORS?: MORE THAN THREE TIMES A WEEK
HOW OFTEN DO YOU GET TOGETHER WITH FRIENDS OR RELATIVES?: ONCE A WEEK
DO YOU BELONG TO ANY CLUBS OR ORGANIZATIONS SUCH AS CHURCH GROUPS UNIONS, FRATERNAL OR ATHLETIC GROUPS, OR SCHOOL GROUPS?: NO

## 2021-11-11 ASSESSMENT — LIFESTYLE VARIABLES
HOW OFTEN DO YOU HAVE SIX OR MORE DRINKS ON ONE OCCASION: NEVER
HOW OFTEN DO YOU HAVE A DRINK CONTAINING ALCOHOL: NEVER
HOW MANY STANDARD DRINKS CONTAINING ALCOHOL DO YOU HAVE ON A TYPICAL DAY: PATIENT DECLINED

## 2021-11-11 ASSESSMENT — PATIENT HEALTH QUESTIONNAIRE - PHQ9
10. IF YOU CHECKED OFF ANY PROBLEMS, HOW DIFFICULT HAVE THESE PROBLEMS MADE IT FOR YOU TO DO YOUR WORK, TAKE CARE OF THINGS AT HOME, OR GET ALONG WITH OTHER PEOPLE: SOMEWHAT DIFFICULT
SUM OF ALL RESPONSES TO PHQ QUESTIONS 1-9: 14
SUM OF ALL RESPONSES TO PHQ QUESTIONS 1-9: 14

## 2021-11-11 ASSESSMENT — MIFFLIN-ST. JEOR: SCORE: 958.15

## 2021-11-11 NOTE — PATIENT INSTRUCTIONS
Patient Education   Personalized Prevention Plan  You are due for the preventive services outlined below.  Your care team is available to assist you in scheduling these services.  If you have already completed any of these items, please share that information with your care team to update in your medical record.  Health Maintenance Due   Topic Date Due     Osteoporosis Screening  Never done     COPD Action Plan  Never done     Hepatitis C Screening  Never done     Zoster (Shingles) Vaccine (1 of 2) Never done     Discuss Advance Care Planning  02/17/2017     Pneumococcal Vaccine (1 of 1 - PPSV23) Never done     LUNG CANCER SCREENING  11/14/2020     Flu Vaccine (1) Never done     ANNUAL REVIEW OF HM ORDERS  10/13/2021     COVID-19 Vaccine (3 - Booster for Pfizer series) 10/21/2021     FALL RISK ASSESSMENT  11/10/2021     Annual Wellness Visit  11/10/2021     Colorectal Cancer Screening  11/17/2021

## 2021-11-11 NOTE — PROGRESS NOTES
"SUBJECTIVE:   Jeannine Carpio is a 71 year old female who presents for Preventive Visit.    {Patient has been advised of split billing requirements and indicates understanding: Yes   Are you in the first 12 months of your Medicare coverage?  No    Healthy Habits:     In general, how would you rate your overall health?  Good    Frequency of exercise:  1 day/week    Do you usually eat at least 4 servings of fruit and vegetables a day, include whole grains    & fiber and avoid regularly eating high fat or \"junk\" foods?  No    Taking medications regularly:  Yes    Ability to successfully perform activities of daily living:  No assistance needed    Home Safety:  No safety concerns identified    Hearing Impairment:  Difficulty following a conversation in a noisy restaurant or crowded room, feel that people are mumbling or not speaking clearly, need to ask people to speak up or repeat themselves and difficulty understanding soft or whispered speech    In the past 6 months, have you been bothered by leaking of urine?  No    In general, how would you rate your overall mental or emotional health?  Poor      PHQ-2 Total Score: 3    Additional concerns today:  No  Breast cancer screening:  Last mammogram 11/2020  Colon cancer screening:  Last FIT 11/17/2020  Lung cancer screening:  Last 2019     Anxiety:  In the last 6 months has had increase in anxiety, is not able to sit still.  Denies difficulty with insomnia.  Anxiety largely due to her son that has mental health issues, will only talk to her.   Toenail fungus:  Left great toenail is about to fall off, requests referral to podiatry.        Do you feel safe in your environment? Yes    Have you ever done Advance Care Planning? (For example, a Health Directive, POLST, or a discussion with a medical provider or your loved ones about your wishes): No, advance care planning information given to patient to review.  Patient declined advance care planning discussion at this time.     "   Fall risk  Fallen 2 or more times in the past year?: No  Any fall with injury in the past year?: No    Cognitive Screening   1) Repeat 3 items (Leader, Season, Table)    2) Clock draw: NORMAL  3) 3 item recall: Recalls 3 objects  Results: NORMAL clock, 1-2 items recalled: COGNITIVE IMPAIRMENT LESS LIKELY      Reviewed and updated as needed this visit by clinical staff                Reviewed and updated as needed this visit by Provider               Social History     Tobacco Use     Smoking status: Current Every Day Smoker     Packs/day: 0.50     Types: Cigarettes     Smokeless tobacco: Never Used   Substance Use Topics     Alcohol use: No         Alcohol Use 11/11/2021   Prescreen: >3 drinks/day or >7 drinks/week? Not Applicable   Prescreen: >3 drinks/day or >7 drinks/week? -     Current providers sharing in care for this patient include:   Patient Care Team:  Haase, Susan Rachele, APRN CNP as PCP - General (Nurse Practitioner)  Haase, Susan Rachele, APRN CNP as Assigned PCP    The following health maintenance items are reviewed in Epic and correct as of today:  Health Maintenance Due   Topic Date Due     DEXA  Never done     COPD ACTION PLAN  Never done     HEPATITIS C SCREENING  Never done     ZOSTER IMMUNIZATION (1 of 2) Never done     ADVANCE CARE PLANNING  02/17/2017     Pneumococcal Vaccine: 65+ Years (1 of 1 - PPSV23) Never done     LUNG CANCER SCREENING  11/14/2020     INFLUENZA VACCINE (1) Never done     ANNUAL REVIEW OF HM ORDERS  10/13/2021     COVID-19 Vaccine (3 - Booster for Pfizer series) 10/21/2021     FALL RISK ASSESSMENT  11/10/2021     MEDICARE ANNUAL WELLNESS VISIT  11/10/2021     COLORECTAL CANCER SCREENING  11/17/2021     Lab work is in process  BP Readings from Last 3 Encounters:   11/11/21 (!) 148/88   10/13/21 (!) 144/80   11/10/20 121/72    Wt Readings from Last 3 Encounters:   11/11/21 44.8 kg (98 lb 12.8 oz)   10/13/21 44.2 kg (97 lb 6.4 oz)   11/10/20 44.5 kg (98 lb 3.2 oz)                   Patient Active Problem List   Diagnosis     Tobacco dependence     Arthralgia     Lumbar radiculopathy     CARDIOVASCULAR SCREENING; LDL GOAL LESS THAN 130     Vaginal atrophy     Menopause     DDD (degenerative disc disease), lumbar     Advanced directives, counseling/discussion     Social anxiety disorder     Mild major depression (H)     Fatigue     Vitamin D insufficiency     Elevated glucose     Protein-calorie malnutrition, unspecified severity (H)     Hypothyroidism, unspecified type     Lung nodule, solitary     Pulmonary emphysema, unspecified emphysema type (H)     Past Surgical History:   Procedure Laterality Date     LEEP TX, CERVICAL      LEEP TX Cervical       Social History     Tobacco Use     Smoking status: Current Every Day Smoker     Packs/day: 0.50     Types: Cigarettes     Smokeless tobacco: Never Used   Substance Use Topics     Alcohol use: No     Family History   Problem Relation Age of Onset     Diabetes Mother      Cerebrovascular Disease Mother      Arthritis Mother      Eye Disorder Mother         Cataracts     Heart Disease Mother         CHF, A-Fib     Osteoporosis Mother      Thyroid Disease Mother         Hypothyroidism     Alzheimer Disease Father      Eye Disorder Father         Cataracts     Heart Disease Father         Bypass     Thyroid Disease Maternal Grandmother      Thyroid Disease Brother         Hyperthyroidism     Alcoholism Brother      Depression Sister      Thyroid Disease Sister      Thyroid Disease Daughter         Hypothyroid         Current Outpatient Medications   Medication Sig Dispense Refill     pravastatin (PRAVACHOL) 10 MG tablet Take 1 tablet (10 mg) by mouth daily 90 tablet 0     albuterol (PROAIR HFA/PROVENTIL HFA/VENTOLIN HFA) 108 (90 Base) MCG/ACT inhaler Inhale 2 puffs into the lungs every 6 hours 8.5 g 4     Cholecalciferol (VITAMIN D) 2000 UNIT tablet Take 1,000 Units by mouth daily        levothyroxine (SYNTHROID/LEVOTHROID) 25 MCG tablet TAKE  "1 TABLET (25 MCG) BY MOUTH DAILY 90 tablet 0     OMEPRAZOLE PO        Allergies   Allergen Reactions     Ibuprofen Hives     Penicillins Hives     Tetracyclines Hives       FHS-7: No flowsheet data found.    Mammogram Screening: Recommended mammography every 1-2 years with patient discussion and risk factor consideration  Pertinent mammograms are reviewed under the imaging tab.    Review of Systems   Constitutional: Negative for chills and fever.   HENT: Positive for ear pain and hearing loss. Negative for congestion and sore throat.    Eyes: Positive for pain. Negative for visual disturbance.   Respiratory: Positive for cough and shortness of breath.    Cardiovascular: Positive for palpitations. Negative for chest pain and peripheral edema.   Gastrointestinal: Positive for heartburn and nausea. Negative for abdominal pain, constipation, diarrhea and hematochezia.   Breasts:  Negative for tenderness, breast mass and discharge.   Genitourinary: Positive for urgency. Negative for dysuria, frequency, genital sores, hematuria, pelvic pain, vaginal bleeding and vaginal discharge.   Musculoskeletal: Positive for arthralgias, joint swelling and myalgias.   Skin: Negative for rash.   Neurological: Positive for headaches. Negative for weakness and paresthesias.   Psychiatric/Behavioral: Positive for mood changes. The patient is nervous/anxious.    Bilateral ear pain:  Chronic, bilateral.  Had a sudden episode of loss of hearing last week that lasted 10-15 min ago, hearing has been fine since that time.    Dry eyes:  Eye drops help.    GERD:  Taking omeprazole daily, denies heartburn at night.    Depression:  PHQ 9 of 14, denies thoughts of harming self or others, difficulty with her son.     OBJECTIVE:   BP (!) 148/88   Pulse 114   Temp 98.2  F (36.8  C) (Oral)   Resp 18   Ht 1.626 m (5' 4\")   Wt 44.8 kg (98 lb 12.8 oz)   SpO2 99%   BMI 16.96 kg/m   Estimated body mass index is 16.46 kg/m  as calculated from the " "following:    Height as of 11/10/20: 1.638 m (5' 4.5\").    Weight as of 10/13/21: 44.2 kg (97 lb 6.4 oz).  Physical Exam  GENERAL:very thin,  alert and no distress  EYES: Eyes grossly normal to inspection,  HENT: ear canals and TM's normal, nose and mouth without ulcers or lesions  NECK: no adenopathy, no asymmetry, masses, or scars and thyroid normal to palpation  RESP: lungs clear to auscultation - no rales, rhonchi or wheezes  BREAST: normal without masses, tenderness or nipple discharge and no palpable axillary masses or adenopathy  CV: regular rate and rhythm, normal S1 S2, no S3 or S4, no murmur, click or rub, no peripheral edema  ABDOMEN: soft, nontender, no hepatosplenomegaly, no masses and bowel sounds normal  MS: no gross musculoskeletal defects noted, no edema  SKIN: numerous moles. Left great toenails thick, yellow.  NEURO: Normal strength and tone, mentation intact and speech normal  PSYCH: mentation appears normal, affect normal/bright    ASSESSMENT / PLAN:   Jeannine was seen today for wellness visit.    Diagnoses and all orders for this visit:    Encounter for Medicare annual wellness exam  -     CBC with Platelets & Differential; Future  -     Comprehensive metabolic panel; Future  -     Lipid panel reflex to direct LDL Fasting; Future  -     Adult Dermatology Referral; Future  -     Lipid panel reflex to direct LDL Fasting  -     Comprehensive metabolic panel  -     CBC with Platelets & Differential    Hypothyroidism, unspecified type; TSH pending, will refill levothyroxine based on results.  -     TSH with free T4 reflex; Future  -     TSH with free T4 reflex    Anxiety: will start on propranolol, may also help with elevated HR and borderline HTN. Instructed regarding risks/side effects. Will have her daughter check her BP and pulse in 2 weeks, follow up visit with me in 4 weeks.    -     propranolol ER (INDERAL LA) 60 MG 24 hr capsule; Take 1 capsule (60 mg) by mouth daily    Mild major depression " "(H): PhQ 9 of 14, denies thoughts of harming self or others.  Does not want to take an selective serotonin reuptake inhibitor or SNRI due to side effects in the past.  Has support of family.    Onychomycosis referral to podiatry for possible removal of toenail  -     Orthopedic  Referral; Future    Protein-calorie malnutrition, unspecified severity (H):  Discussed high protein food options, weight stable over last 5 years.    Pulmonary emphysema, unspecified emphysema type (H): continues to smoke 1/2 ppd, declines cessation.  Using inhaler on a prn basis.    Other orders  -     REVIEW OF HEALTH MAINTENANCE PROTOCOL ORDERS        Patient has been advised of split billing requirements and indicates understanding:   COUNSELING:  Reviewed preventive health counseling, as reflected in patient instructions       Regular exercise       Healthy diet/nutrition    Estimated body mass index is 16.46 kg/m  as calculated from the following:    Height as of 11/10/20: 1.638 m (5' 4.5\").    Weight as of 10/13/21: 44.2 kg (97 lb 6.4 oz).    Weight management plan noted, stable and monitoring    She reports that she has been smoking cigarettes. She has been smoking about 0.50 packs per day. She has never used smokeless tobacco.  Tobacco Cessation Action Plan:   Information offered: Patient not interested at this time      Appropriate preventive services were discussed with this patient, including applicable screening as appropriate for cardiovascular disease, diabetes, osteopenia/osteoporosis, and glaucoma.  As appropriate for age/gender, discussed screening for colorectal cancer, prostate cancer, breast cancer, and cervical cancer. Checklist reviewing preventive services available has been given to the patient.    Reviewed patients plan of care and provided an AVS. The Basic Care Plan (routine screening as documented in Health Maintenance) for Jeannine meets the Care Plan requirement. This Care Plan has been established and " reviewed with the Patient.    Counseling Resources:  ATP IV Guidelines  Pooled Cohorts Equation Calculator  Breast Cancer Risk Calculator  Breast Cancer: Medication to Reduce Risk  FRAX Risk Assessment  ICSI Preventive Guidelines  Dietary Guidelines for Americans, 2010  USDA's MyPlate  ASA Prophylaxis  Lung CA Screening  Follow up in 4 weeks, sooner as needed.   Susan Haase, APRN [facility]    Identified Health Risks:  Answers for HPI/ROS submitted by the patient on 11/11/2021  If you checked off any problems, how difficult have these problems made it for you to do your work, take care of things at home, or get along with other people?: Somewhat difficult  PHQ9 TOTAL SCORE: 14

## 2021-11-12 LAB
ALBUMIN SERPL-MCNC: 3.9 G/DL (ref 3.4–5)
ALP SERPL-CCNC: 76 U/L (ref 40–150)
ALT SERPL W P-5'-P-CCNC: 24 U/L (ref 0–50)
ANION GAP SERPL CALCULATED.3IONS-SCNC: 3 MMOL/L (ref 3–14)
AST SERPL W P-5'-P-CCNC: 16 U/L (ref 0–45)
BILIRUB SERPL-MCNC: 0.7 MG/DL (ref 0.2–1.3)
BUN SERPL-MCNC: 11 MG/DL (ref 7–30)
CALCIUM SERPL-MCNC: 9.1 MG/DL (ref 8.5–10.1)
CHLORIDE BLD-SCNC: 105 MMOL/L (ref 94–109)
CHOLEST SERPL-MCNC: 199 MG/DL
CO2 SERPL-SCNC: 29 MMOL/L (ref 20–32)
CREAT SERPL-MCNC: 0.66 MG/DL (ref 0.52–1.04)
FASTING STATUS PATIENT QL REPORTED: YES
GFR SERPL CREATININE-BSD FRML MDRD: 90 ML/MIN/1.73M2
GLUCOSE BLD-MCNC: 109 MG/DL (ref 70–99)
HDLC SERPL-MCNC: 46 MG/DL
LDLC SERPL CALC-MCNC: 118 MG/DL
NONHDLC SERPL-MCNC: 153 MG/DL
POTASSIUM BLD-SCNC: 4 MMOL/L (ref 3.4–5.3)
PROT SERPL-MCNC: 7.1 G/DL (ref 6.8–8.8)
SODIUM SERPL-SCNC: 137 MMOL/L (ref 133–144)
TRIGL SERPL-MCNC: 174 MG/DL
TSH SERPL DL<=0.005 MIU/L-ACNC: 3.68 MU/L (ref 0.4–4)

## 2021-11-12 ASSESSMENT — PATIENT HEALTH QUESTIONNAIRE - PHQ9: SUM OF ALL RESPONSES TO PHQ QUESTIONS 1-9: 14

## 2021-11-14 DIAGNOSIS — E03.9 HYPOTHYROIDISM, UNSPECIFIED TYPE: ICD-10-CM

## 2021-11-14 DIAGNOSIS — E78.00 HYPERCHOLESTEROLEMIA: ICD-10-CM

## 2021-11-14 RX ORDER — LEVOTHYROXINE SODIUM 25 UG/1
25 TABLET ORAL DAILY
Qty: 90 TABLET | Refills: 3 | Status: SHIPPED | OUTPATIENT
Start: 2021-11-14 | End: 2023-01-04

## 2021-11-14 RX ORDER — PRAVASTATIN SODIUM 10 MG
10 TABLET ORAL DAILY
Qty: 90 TABLET | Refills: 3 | Status: SHIPPED | OUTPATIENT
Start: 2021-11-14 | End: 2022-11-08

## 2021-11-19 ENCOUNTER — OFFICE VISIT (OUTPATIENT)
Dept: PODIATRY | Facility: CLINIC | Age: 71
End: 2021-11-19
Payer: MEDICARE

## 2021-11-19 ENCOUNTER — TELEPHONE (OUTPATIENT)
Dept: FAMILY MEDICINE | Facility: CLINIC | Age: 71
End: 2021-11-19
Payer: MEDICARE

## 2021-11-19 VITALS
SYSTOLIC BLOOD PRESSURE: 118 MMHG | BODY MASS INDEX: 17.11 KG/M2 | HEIGHT: 64 IN | WEIGHT: 100.2 LBS | RESPIRATION RATE: 20 BRPM | DIASTOLIC BLOOD PRESSURE: 78 MMHG

## 2021-11-19 DIAGNOSIS — F17.200 TOBACCO DEPENDENCE: ICD-10-CM

## 2021-11-19 DIAGNOSIS — M79.672 FOOT PAIN, BILATERAL: Primary | ICD-10-CM

## 2021-11-19 DIAGNOSIS — M79.671 FOOT PAIN, BILATERAL: Primary | ICD-10-CM

## 2021-11-19 DIAGNOSIS — B35.1 ONYCHOMYCOSIS: ICD-10-CM

## 2021-11-19 PROCEDURE — 99203 OFFICE O/P NEW LOW 30 MIN: CPT | Performed by: PODIATRIST

## 2021-11-19 RX ORDER — CICLOPIROX OLAMINE 7.7 MG/G
CREAM TOPICAL DAILY
Qty: 30 G | Refills: 6 | Status: SHIPPED | OUTPATIENT
Start: 2021-11-19 | End: 2023-12-14

## 2021-11-19 ASSESSMENT — MIFFLIN-ST. JEOR: SCORE: 964.5

## 2021-11-19 NOTE — LETTER
[facility]  [address] [place], [address]  [phone]        November 19, 2021    Jeannine Carpio                                                                                                                                                       [address] [facility] [address]          Rodney Paez,    Your lab results are as below:    1)  TSH (thyroid level) 3.68 which is normal (range 0.4-4). Continue on levothyroxine as prescribed.  2)  Cholesterol is normal at 199, your LDL (bad cholesterol) is normal and your HDL (good cholesterol) is slightly lower than normal.  Continue on pravastatin daily as prescribed.   3)  Glucose is slightly elevated at 109 (normal fasting is <100).  4)  CBC (checks for anemia and infection) is normal.     If you have any questions do not hesitate to call the clinic to discuss the results with me further.      Sincerely,     Susan Haase, CNP

## 2021-11-19 NOTE — PROGRESS NOTES
PATIENT HISTORY:  Mayra KOHLER requested I see this patient for their foot issue.  Jeannine Carpio is a 71 year old female who presents to clinic for nail fungus.  Patient notes that she has had it for 4 years.  Sometimes the feet and toes will ache.  That can be every day.  Denies specific injury.  Has not done anything for the nails.  Wondering what can be done to get rid of the nail fungus.    Review of Systems:  Patient denies fever, chills, rash, wound, stiffness, limping, numbness, weakness, heart burn, blood in stool, chest pain with activity, calf pain when walking, shortness of breath with activity, chronic cough, easy bleeding/bruising, swelling of ankles, excessive thirst, fatigue, depression, anxiety.       PAST MEDICAL HISTORY:   Past Medical History:   Diagnosis Date     Abnormal glandular Papanicolaou smear of cervix     cryo and LEEP done, normal since     Delivery normal      1 miscarriage     Menopause age 54        PAST SURGICAL HISTORY:   Past Surgical History:   Procedure Laterality Date     LEEP TX, CERVICAL      LEEP TX Cervical        MEDICATIONS:   Current Outpatient Medications:      albuterol (PROAIR HFA/PROVENTIL HFA/VENTOLIN HFA) 108 (90 Base) MCG/ACT inhaler, Inhale 2 puffs into the lungs every 6 hours, Disp: 8.5 g, Rfl: 4     Cholecalciferol (VITAMIN D) 2000 UNIT tablet, Take 1,000 Units by mouth daily , Disp: , Rfl:      levothyroxine (SYNTHROID/LEVOTHROID) 25 MCG tablet, Take 1 tablet (25 mcg) by mouth daily, Disp: 90 tablet, Rfl: 3     OMEPRAZOLE PO, , Disp: , Rfl:      pravastatin (PRAVACHOL) 10 MG tablet, Take 1 tablet (10 mg) by mouth daily, Disp: 90 tablet, Rfl: 3     propranolol ER (INDERAL LA) 60 MG 24 hr capsule, Take 1 capsule (60 mg) by mouth daily, Disp: 30 capsule, Rfl: 3     ALLERGIES:    Allergies   Allergen Reactions     Ibuprofen Hives     Penicillins Hives     Tetracyclines Hives        SOCIAL HISTORY:   Social History     Socioeconomic History     Marital  status:      Spouse name: Damon     Number of children: 6     Years of education: Not on file     Highest education level: 12th grade   Occupational History     Occupation: packagaing line, factory     Employer: [employer]   Tobacco Use     Smoking status: Current Every Day Smoker     Packs/day: 0.50     Types: Cigarettes     Smokeless tobacco: Never Used   Vaping Use     Vaping Use: Never used   Substance and Sexual Activity     Alcohol use: No     Drug use: No     Sexual activity: Not Currently     Partners: Male   Other Topics Concern     Parent/sibling w/ CABG, MI or angioplasty before 65F 55M? Not Asked   Social History Narrative     Not on file     Social Determinants of Health     Financial Resource Strain: Low Risk      Difficulty of Paying Living Expenses: Not very hard   Food Insecurity: Unknown     Worried About Running Out of Food in the Last Year: Patient refused     Ran Out of Food in the Last Year: Never true   Transportation Needs: No Transportation Needs     Lack of Transportation (Medical): No     Lack of Transportation (Non-Medical): No   Physical Activity: Insufficiently Active     Days of Exercise per Week: 2 days     Minutes of Exercise per Session: 30 min   Stress: Stress Concern Present     Feeling of Stress : To some extent   Social Connections: Moderately Isolated     Frequency of Communication with Friends and Family: More than three times a week     Frequency of Social Gatherings with Friends and Family: Once a week     Attends Yazidi Services: More than 4 times per year     Active Member of Clubs or Organizations: No     Attends Club or Organization Meetings: Not on file     Marital Status:    Intimate Partner Violence: Not on file   Housing Stability: Low Risk      Unable to Pay for Housing in the Last Year: No     Number of Places Lived in the Last Year: 1     Unstable Housing in the Last Year: No        FAMILY HISTORY:   Family History   Problem Relation Age of Onset  "    Diabetes Mother      Cerebrovascular Disease Mother      Arthritis Mother      Eye Disorder Mother         Cataracts     Heart Disease Mother         CHF, A-Fib     Osteoporosis Mother      Thyroid Disease Mother         Hypothyroidism     Alzheimer Disease Father      Eye Disorder Father         Cataracts     Heart Disease Father         Bypass     Thyroid Disease Maternal Grandmother      Thyroid Disease Brother         Hyperthyroidism     Alcoholism Brother      Depression Sister      Thyroid Disease Sister      Thyroid Disease Daughter         Hypothyroid        EXAM:Vitals: /78   Resp 20   Ht 1.626 m (5' 4\")   Wt 45.5 kg (100 lb 3.2 oz)   BMI 17.20 kg/m      A1C: 5.7 (11/2020)    General appearance: Patient is alert and fully cooperative with history & exam.  No sign of distress is noted during the visit.     Psychiatric: Affect is pleasant & appropriate.  Patient appears motivated to improve health.     Respiratory: Breathing is regular & unlabored while sitting.     HEENT: Hearing is intact to spoken word.  Speech is clear.  No gross evidence of visual impairment that would impact ambulation.     Dermatologic: Nails x10 are thickened, darkened, dystrophic and present with subungual debris.  No open lesions or acute signs of infection noted.v       Vascular: DP & PT pulses are intact & regular bilaterally.  No significant edema or varicosities noted.  CFT and skin temperature is normal to both lower extremities.     Neurologic: Lower extremity sensation is intact to light touch.  No evidence of weakness or contracture in the lower extremities.  No evidence of neuropathy.     Musculoskeletal: Patient is ambulatory without assistive device or brace.  No gross ankle deformity noted.  No foot or ankle joint effusion is noted.     ASSESSMENT:    Onychomycosis  Foot pain, bilateral  Tobacco dependence     Medical Decision Making/Plan:  Reviewed patient's chart in epic. Discussed causes and treatments " of nail fungus.  Explained that even if a culture comes back negative, a patient could still have nail fungus.  Discussed treatment options with patient and explained that there isn't one treatment that is 100% effective.  Discussed oral lamisil which is the most effective at about 70% but which can have liver effects.  Explained that if she wanted to try this that she would need serial blood draws to test her liver function.  Discussed over the counter antifungal creams.  Explained that these are about 50% effective and need to be applied once a day for about 6-8months.  Also talked about prescription penlac which is a nail laquer.  Again this is also only 50% effective.  Also discussed that if there was damage to the nail and the nail is now dystrophic that non of the above is going to change the nail.  If there was damage, there is note anything that can be done for the nail to correct it.  Discussed that if it becomes painful, we can remove the nail in clinic.        At this time we will do an order for an antifungal cream that she will apply daily for the next 6 to 12 months.  Recommend cutting down the nails with a nail file once a week.  All questions were answered to patient satisfaction and she will call for the questions or concerns.      Patient risk factor: Patient is at low risk for infection.        Nedra Castro DPM, Podiatry/Foot and Ankle Surgery

## 2021-11-19 NOTE — LETTER
2021         RE: Jeannine Carpio  [address] [address]        Dear Colleague,    Thank you for referring your patient, Jeannine Carpio, to the [facility]. Please see a copy of my visit note below.    PATIENT HISTORY:  Mayra KOHLER requested I see this patient for their foot issue.  Jeannine Carpio is a 71 year old female who presents to clinic for nail fungus.  Patient notes that she has had it for 4 years.  Sometimes the feet and toes will ache.  That can be every day.  Denies specific injury.  Has not done anything for the nails.  Wondering what can be done to get rid of the nail fungus.    Review of Systems:  Patient denies fever, chills, rash, wound, stiffness, limping, numbness, weakness, heart burn, blood in stool, chest pain with activity, calf pain when walking, shortness of breath with activity, chronic cough, easy bleeding/bruising, swelling of ankles, excessive thirst, fatigue, depression, anxiety.       PAST MEDICAL HISTORY:   Past Medical History:   Diagnosis Date     Abnormal glandular Papanicolaou smear of cervix     cryo and LEEP done, normal since     Delivery normal      1 miscarriage     Menopause age 54        PAST SURGICAL HISTORY:   Past Surgical History:   Procedure Laterality Date     LEEP TX, CERVICAL      LEEP TX Cervical        MEDICATIONS:   Current Outpatient Medications:      albuterol (PROAIR HFA/PROVENTIL HFA/VENTOLIN HFA) 108 (90 Base) MCG/ACT inhaler, Inhale 2 puffs into the lungs every 6 hours, Disp: 8.5 g, Rfl: 4     Cholecalciferol (VITAMIN D) 2000 UNIT tablet, Take 1,000 Units by mouth daily , Disp: , Rfl:      levothyroxine (SYNTHROID/LEVOTHROID) 25 MCG tablet, Take 1 tablet (25 mcg) by mouth daily, Disp: 90 tablet, Rfl: 3     OMEPRAZOLE PO, , Disp: , Rfl:      pravastatin (PRAVACHOL) 10 MG tablet, Take 1 tablet (10 mg) by mouth daily, Disp: 90 tablet, Rfl: 3     propranolol ER (INDERAL LA) 60 MG 24 hr capsule,  Take 1 capsule (60 mg) by mouth daily, Disp: 30 capsule, Rfl: 3     ALLERGIES:    Allergies   Allergen Reactions     Ibuprofen Hives     Penicillins Hives     Tetracyclines Hives        SOCIAL HISTORY:   Social History     Socioeconomic History     Marital status:      Spouse name: Damon     Number of children: 6     Years of education: Not on file     Highest education level: 12th grade   Occupational History     Occupation: packagaing line, [employer]     Employer: [employer]   Tobacco Use     Smoking status: Current Every Day Smoker     Packs/day: 0.50     Types: Cigarettes     Smokeless tobacco: Never Used   Vaping Use     Vaping Use: Never used   Substance and Sexual Activity     Alcohol use: No     Drug use: No     Sexual activity: Not Currently     Partners: Male   Other Topics Concern     Parent/sibling w/ CABG, MI or angioplasty before 65F 55M? Not Asked   Social History Narrative     Not on file     Social Determinants of Health     Financial Resource Strain: Low Risk      Difficulty of Paying Living Expenses: Not very hard   Food Insecurity: Unknown     Worried About Running Out of Food in the Last Year: Patient refused     Ran Out of Food in the Last Year: Never true   Transportation Needs: No Transportation Needs     Lack of Transportation (Medical): No     Lack of Transportation (Non-Medical): No   Physical Activity: Insufficiently Active     Days of Exercise per Week: 2 days     Minutes of Exercise per Session: 30 min   Stress: Stress Concern Present     Feeling of Stress : To some extent   Social Connections: Moderately Isolated     Frequency of Communication with Friends and Family: More than three times a week     Frequency of Social Gatherings with Friends and Family: Once a week     Attends Nondenominational Services: More than 4 times per year     Active Member of Clubs or Organizations: No     Attends Club or Organization Meetings: Not on file     Marital Status:    Intimate Partner Violence:  "Not on file   Housing Stability: Low Risk      Unable to Pay for Housing in the Last Year: No     Number of Places Lived in the Last Year: 1     Unstable Housing in the Last Year: No        FAMILY HISTORY:   Family History   Problem Relation Age of Onset     Diabetes Mother      Cerebrovascular Disease Mother      Arthritis Mother      Eye Disorder Mother         Cataracts     Heart Disease Mother         CHF, A-Fib     Osteoporosis Mother      Thyroid Disease Mother         Hypothyroidism     Alzheimer Disease Father      Eye Disorder Father         Cataracts     Heart Disease Father         Bypass     Thyroid Disease Maternal Grandmother      Thyroid Disease Brother         Hyperthyroidism     Alcoholism Brother      Depression Sister      Thyroid Disease Sister      Thyroid Disease Daughter         Hypothyroid        EXAM:Vitals: /78   Resp 20   Ht 1.626 m (5' 4\")   Wt 45.5 kg (100 lb 3.2 oz)   BMI 17.20 kg/m      A1C: 5.7 (11/2020)    General appearance: Patient is alert and fully cooperative with history & exam.  No sign of distress is noted during the visit.     Psychiatric: Affect is pleasant & appropriate.  Patient appears motivated to improve health.     Respiratory: Breathing is regular & unlabored while sitting.     HEENT: Hearing is intact to spoken word.  Speech is clear.  No gross evidence of visual impairment that would impact ambulation.     Dermatologic: Nails x10 are thickened, darkened, dystrophic and present with subungual debris.  No open lesions or acute signs of infection noted.v       Vascular: DP & PT pulses are intact & regular bilaterally.  No significant edema or varicosities noted.  CFT and skin temperature is normal to both lower extremities.     Neurologic: Lower extremity sensation is intact to light touch.  No evidence of weakness or contracture in the lower extremities.  No evidence of neuropathy.     Musculoskeletal: Patient is ambulatory without assistive device or " brace.  No gross ankle deformity noted.  No foot or ankle joint effusion is noted.     ASSESSMENT:    Onychomycosis  Foot pain, bilateral  Tobacco dependence     Medical Decision Making/Plan:  Reviewed patient's chart in epic. Discussed causes and treatments of nail fungus.  Explained that even if a culture comes back negative, a patient could still have nail fungus.  Discussed treatment options with patient and explained that there isn't one treatment that is 100% effective.  Discussed oral lamisil which is the most effective at about 70% but which can have liver effects.  Explained that if she wanted to try this that she would need serial blood draws to test her liver function.  Discussed over the counter antifungal creams.  Explained that these are about 50% effective and need to be applied once a day for about 6-8months.  Also talked about prescription penlac which is a nail laquer.  Again this is also only 50% effective.  Also discussed that if there was damage to the nail and the nail is now dystrophic that non of the above is going to change the nail.  If there was damage, there is note anything that can be done for the nail to correct it.  Discussed that if it becomes painful, we can remove the nail in clinic.        At this time we will do an order for an antifungal cream that she will apply daily for the next 6 to 12 months.  Recommend cutting down the nails with a nail file once a week.  All questions were answered to patient satisfaction and she will call for the questions or concerns.      Patient risk factor: Patient is at low risk for infection.        Nedra Castro DPM, Podiatry/Foot and Ankle Surgery        Again, thank you for allowing me to participate in the care of your patient.        Sincerely,        Nedra Castro DPM, Podiatry/Foot and Ankle Surgery

## 2021-11-19 NOTE — PATIENT INSTRUCTIONS
Thank you for choosing [facility] Podiatry / Foot & Ankle Surgery!    DR. ZAMARRIPA'S CLINIC:  [facility]   [id number]   [place] [address]   [phone]  [phone]      SCHEDULE SURGERY: [phone]   BILLING QUESTIONS: [phone]   APPOINTMENTS: [phone]   RADIOLOGY: [phone]   CONSUMER PRICE LINE: [phone]      Follow up: as needed    Next steps: Up antifungal cream and apply daily at night to affected nails for the next 6 months to year.  Once a week take a nail file and file down all the nails.      NAIL FUNGUS / ONYCHOMYCOSIS   Nail fungus is not a hygiene problem and will likely not lead to significant medical problems. The nails may get thick causing pain and possibly local skin infection. Treatments include debridement (trimming), oral antifungals, topical antifungals and complete removal of the nail. Most fungal nails are not treated.     Topicals such as tea tree oil can be helpful for surface fungus and may, at best, limit progression. Over the counter creams (such as Lamisil) can also be used however, their effectiveness is also quite low.  Topical treatment with Pen lac is expensive and often not covered by insurance. Pen lac has an approximate 8% success rate. Topical therapy recommendations is to apply twice a day for at least 3-4 months as it takes 9 months for new nail to grow out.     Experts suggest soaking your feet for 15 to 20 minutes in a mixture of 1 cup vinegar to 4 cups warm water. Be sure to rinse well and pat your feet dry when you're done. You can soak your feet like this daily. But if your skin becomes irritated, try soaking only two to three times a week. Vicks VapoRub, as with vinegar, there have been no controlled clinical trials to assess the effectiveness of Vicks VapoRub on nail fungus, but there have been numerous anecdotal reports that it works. There's no consensus on how often to apply this product, so check with your  doctor before using it on your nails.      Oral therapies include Sporanox and Lamisil. Oral therapies are also expensive and not very effective. Side effects such as liver disease are the main concern. Return of fungus is common even if the treatment worked.      Other Tips:  - Penlac nail medication apply daily x 4 months; remove old polish first day of each week  - Antifungal cream/powder (Zeasorb) - apply daily to feet and shoes x 2 months  - Clean shoes with Lysol or in washing machine every few weeks  - Rotate shoe gear; give them 24 hours to dry out between days wearing them  - Clean pair of socks in morning, clean pair in afternoon if your feet sweat  - Shower shoes used in public showers/pools            Flu vaccines are now available at all [facility] clinics and retail pharmacies across the [facility]. Appointments are required for clinic locations. To schedule an appointment online, please log into [employer] or create an account if you are a new user. You can also call [phone], or simply walk in at one of the [facility] retail pharmacy locations.

## 2021-11-19 NOTE — TELEPHONE ENCOUNTER
Pt is requesting lab results mailed to her.  Letter and lab results mailed to pt's home address.  Jessica Levine, CMA

## 2022-08-17 NOTE — LETTER
17-Aug-2022 10:34 [facility]  [address] [place], [address]  [phone]        November 11, 2020    Jeannine Carpio                                                                                                                                                       [address] [address]          Rodney Paez,     The xray of your toe is normal, your uric acid (test for gout) is also normal. As discussed at our visit if your pain continues please let me know and I will have you see podiatry.     Sincerely,           Susan Haase, CNP         17-Aug-2022 10:36

## 2022-09-13 ENCOUNTER — TELEPHONE (OUTPATIENT)
Dept: URGENT CARE | Facility: URGENT CARE | Age: 72
End: 2022-09-13

## 2022-09-13 ENCOUNTER — OFFICE VISIT (OUTPATIENT)
Dept: FAMILY MEDICINE | Facility: CLINIC | Age: 72
End: 2022-09-13
Payer: MEDICARE

## 2022-09-13 VITALS
BODY MASS INDEX: 16.6 KG/M2 | TEMPERATURE: 97.6 F | HEIGHT: 65 IN | SYSTOLIC BLOOD PRESSURE: 122 MMHG | DIASTOLIC BLOOD PRESSURE: 60 MMHG | WEIGHT: 99.6 LBS | OXYGEN SATURATION: 98 % | HEART RATE: 87 BPM

## 2022-09-13 DIAGNOSIS — M25.562 ACUTE PAIN OF BOTH KNEES: ICD-10-CM

## 2022-09-13 DIAGNOSIS — M25.561 ACUTE PAIN OF BOTH KNEES: ICD-10-CM

## 2022-09-13 DIAGNOSIS — J43.9 PULMONARY EMPHYSEMA, UNSPECIFIED EMPHYSEMA TYPE (H): ICD-10-CM

## 2022-09-13 DIAGNOSIS — M72.2 PLANTAR FASCIITIS: ICD-10-CM

## 2022-09-13 DIAGNOSIS — E46 PROTEIN-CALORIE MALNUTRITION, UNSPECIFIED SEVERITY (H): ICD-10-CM

## 2022-09-13 DIAGNOSIS — F33.1 MODERATE EPISODE OF RECURRENT MAJOR DEPRESSIVE DISORDER (H): Primary | ICD-10-CM

## 2022-09-13 PROCEDURE — 99214 OFFICE O/P EST MOD 30 MIN: CPT | Performed by: PHYSICIAN ASSISTANT

## 2022-09-13 RX ORDER — ALBUTEROL SULFATE 90 UG/1
2 AEROSOL, METERED RESPIRATORY (INHALATION) EVERY 6 HOURS
Qty: 8.5 G | Refills: 4 | Status: SHIPPED | OUTPATIENT
Start: 2022-09-13

## 2022-09-13 ASSESSMENT — ANXIETY QUESTIONNAIRES
GAD7 TOTAL SCORE: 18
4. TROUBLE RELAXING: NEARLY EVERY DAY
GAD7 TOTAL SCORE: 18
GAD7 TOTAL SCORE: 18
7. FEELING AFRAID AS IF SOMETHING AWFUL MIGHT HAPPEN: NEARLY EVERY DAY
6. BECOMING EASILY ANNOYED OR IRRITABLE: NOT AT ALL
1. FEELING NERVOUS, ANXIOUS, OR ON EDGE: NEARLY EVERY DAY
2. NOT BEING ABLE TO STOP OR CONTROL WORRYING: NEARLY EVERY DAY
5. BEING SO RESTLESS THAT IT IS HARD TO SIT STILL: NEARLY EVERY DAY
8. IF YOU CHECKED OFF ANY PROBLEMS, HOW DIFFICULT HAVE THESE MADE IT FOR YOU TO DO YOUR WORK, TAKE CARE OF THINGS AT HOME, OR GET ALONG WITH OTHER PEOPLE?: SOMEWHAT DIFFICULT
3. WORRYING TOO MUCH ABOUT DIFFERENT THINGS: NEARLY EVERY DAY
7. FEELING AFRAID AS IF SOMETHING AWFUL MIGHT HAPPEN: NEARLY EVERY DAY
IF YOU CHECKED OFF ANY PROBLEMS ON THIS QUESTIONNAIRE, HOW DIFFICULT HAVE THESE PROBLEMS MADE IT FOR YOU TO DO YOUR WORK, TAKE CARE OF THINGS AT HOME, OR GET ALONG WITH OTHER PEOPLE: SOMEWHAT DIFFICULT

## 2022-09-13 ASSESSMENT — PATIENT HEALTH QUESTIONNAIRE - PHQ9
SUM OF ALL RESPONSES TO PHQ QUESTIONS 1-9: 17
SUM OF ALL RESPONSES TO PHQ QUESTIONS 1-9: 17
10. IF YOU CHECKED OFF ANY PROBLEMS, HOW DIFFICULT HAVE THESE PROBLEMS MADE IT FOR YOU TO DO YOUR WORK, TAKE CARE OF THINGS AT HOME, OR GET ALONG WITH OTHER PEOPLE: SOMEWHAT DIFFICULT

## 2022-09-13 NOTE — TELEPHONE ENCOUNTER
Called pt and relayed provider message below. Patient was given an opportunity to ask questions, verbalized understanding of plan, and is agreeable.    Radha Wallace RN

## 2022-09-13 NOTE — PROGRESS NOTES
Assessment & Plan     Moderate episode of recurrent major depressive disorder (H)  Currently depression is worsened. She has had a lot of stressors with her  passing, daughter with medical problems (stroke), son with bipolar and helping manage this with him and loss of a good friend. Patient does not wish to take medications due to concern for side effects. [facility] visit scheduled today.    Acute pain of both knees  X-rays of the knees ordered. Likely has some arthritis. Referral to orthopedics placed.  - XR Knee Standing Bilateral 3 Views; Future  - Orthopedic  Referral; Future    Plantar fasciitis  History consistent with plantar fasciitis. Discussed at home treatments and referral to podiatry placed.  - Orthopedic  Referral; Future    Protein-calorie malnutrition, unspecified severity (H)  Discussed nutrition. Patient eats 3 small meals a day. Since losing her  she has lost some weight (10 pounds per patient, though vitals shows patient down only about a pound). She has had trouble adding this back on. Nutrition referral discussed but declined at this time. Continue to monitor.  She has an annual due in 2 months. Recheck weight at that time.    Pulmonary emphysema, unspecified emphysema type (H)  Patient uses as needed. Refilled today. Continues to smoke about 1/2 pack daily. She would like to quit but declines cessation counseling at this time.  - albuterol (PROAIR HFA/PROVENTIL HFA/VENTOLIN HFA) 108 (90 Base) MCG/ACT inhaler; Inhale 2 puffs into the lungs every 6 hours    Review of external notes as documented elsewhere in note  Ordering of each unique test  Prescription drug management  38 minutes spent on the date of the encounter doing chart review, history and exam, documentation and further activities per the note       See Patient Instructions    No follow-ups on file.   Follow-up Visit   Expected date:  Nov 13, 2022 (Approximate)      Follow Up Appointment Details:      Follow-up with whom?: Specify Provider    Provider Name: MARLA VIRAMONTES    Follow-Up for what?: Medicare Wellness    Any Additional Chronic Condition Management?:  Depression  Anxiety       Welcome or Annual?: Annual Wellness    How?: In Person                    Hina Gabriel PA-C  [facility]    Call with x-ray results    Subjective   Jeannine is a 72 year old, presenting for the following health issues:  Depression and Musculoskeletal Problem (Pain in legs and feet)      HPI     Foot/Leg Pain:  Going on for a few months, has been dealing with it but it seems to be getting worse.  She notes she has pain in the feet (worse in the morning but present throughout the day)  She indicates she is having pain in the knees.  Aches all the time, lower extremities sore to the touch.    No injuries- has arthritis, believes it is now impacting her lower extremities    Depression:  Having a really rough time, feeling hopeless and scared of how she has been feeling    Patient's  passed away after a larkin with cancer, son has bipolar disorder (son lives with patient), best friend passed away, and daughter recently had a stroke. This is all making the patient feel alone.  Patient not interested in medications but would be interested in therapy. She believes she has done therapy in the past a long time ago.    Depression Followup    How are you doing with your depression since your last visit? Worsened see above    Social History     Tobacco Use     Smoking status: Current Every Day Smoker     Packs/day: 0.50     Types: Cigarettes     Smokeless tobacco: Never Used   Vaping Use     Vaping Use: Never used   Substance Use Topics     Alcohol use: No     Drug use: No     PHQ 10/13/2021 11/11/2021 9/13/2022   PHQ-9 Total Score 13 14 17   Q9: Thoughts of better off dead/self-harm past 2 weeks Not at all Not at all Not at all     VERÓNICA-7 SCORE 4/5/2019 10/13/2020 9/13/2022   Total Score - - -  "  Total Score 16 (severe anxiety) 18 (severe anxiety) 18 (severe anxiety)   Total Score 16 18 18     Last PHQ-9 9/13/2022   1.  Little interest or pleasure in doing things 2   2.  Feeling down, depressed, or hopeless 3   3.  Trouble falling or staying asleep, or sleeping too much 2   4.  Feeling tired or having little energy 2   5.  Poor appetite or overeating 1   6.  Feeling bad about yourself 1   7.  Trouble concentrating 3   8.  Moving slowly or restless 3   Q9: Thoughts of better off dead/self-harm past 2 weeks 0   PHQ-9 Total Score 17   Difficulty at work, home, or with people -     VERÓNICA-7  9/13/2022   1. Feeling nervous, anxious, or on edge 3   2. Not being able to stop or control worrying 3   3. Worrying too much about different things 3   4. Trouble relaxing 3   5. Being so restless that it is hard to sit still 3   6. Becoming easily annoyed or irritable 0   7. Feeling afraid, as if something awful might happen 3   VERÓNICA-7 Total Score 18   If you checked any problems, how difficult have they made it for you to do your work, take care of things at home, or get along with other people? Somewhat difficult       Suicide Assessment Five-step Evaluation and Treatment (SAFE-T)      How many servings of fruits and vegetables do you eat daily?  2-3    On average, how many sweetened beverages do you drink each day (Examples: soda, juice, sweet tea, etc.  Do NOT count diet or artificially sweetened beverages)?   0    How many days per week do you exercise enough to make your heart beat faster? 7    How many minutes a day do you exercise enough to make your heart beat faster? 10 - 19    How many days per week do you miss taking your medication? 0      Review of Systems   GENERAL:  No fevers  MUSCULOSKELETAL: As noted in HPI            Objective    /60   Pulse 87   Temp 97.6  F (36.4  C) (Oral)   Ht 1.651 m (5' 5\")   Wt 45.2 kg (99 lb 9.6 oz)   SpO2 98%   BMI 16.57 kg/m    Body mass index is 16.57 " kg/m .  Physical Exam   GENERAL: No acute distress  HEENT: Normocephalic  VASCULAR: 1+ DP and PT pulses bilaterally  EXTREMITIES:   Right lower extremity: No tenderness over the quadriceps tendon or tibial tuberosity. Some tenderness over the lateral and medial joint spaces. Full range of motion with flexion and extension. No valgus or varus laxity. Negative Lachman's.  Left lower extremity: No tenderness over the quadriceps tendon or tibial tuberosity. Some tenderness over the lateral and medial joint spaces. Full range of motion with flexion and extension. No valgus or varus laxity. Negative Lachman's.  NEURO: Alert, non-focal

## 2022-09-13 NOTE — PATIENT INSTRUCTIONS
Pneumococcal 20 immunization recommended- please ask pharmacy     Referral to Behavioral Health Clinician (BHC)- Cyndi Hunt    During our visit, I encouraged you talk with our Behavioral Health Clinician (BHC). A BHC would be a great addition to your care team and will support your whole health!    What is a Behavioral Health Clinician (BHC)?    A BHC is a licensed mental health therapist. They can help you when behaviors, emotions, stress or worries get in the way of your life or health. Your BHC will work with you and your primary care team. Together, you'll come up with a unique care plan that works for you!         What will happen when I meet with a BHC?    BHCs ask questions to better understand your concerns. They will provide brief, solution-focused therapy. They can also make referrals to a specialty therapist or other services to help you reach your goals.      How do I schedule an appointment with the [facility]?    Call [phone] and ask for a C appointment.     How much time will I spend with the [facility]?     First visits are 45-60 minutes.  Return visits are typically 20-30 minutes.         How does billing work?      Outpatient mental health services are billed to insurance. Most plans don't charge a second co-pay if you see your primary care provider (PCP) the same day. Please verify your coverage and ask about your copay.

## 2022-09-13 NOTE — TELEPHONE ENCOUNTER
----- Message from Hina Gabriel PA-C sent at 9/13/2022 12:59 PM CDT -----  Please call patient and let her know that the x-rays do not show any significant arthritis. It is still possible arthritis is the cause of her pain. Follow up with orthopedics as discussed at visit.  X-ray also shows a small bony growth (possible osteochondroma which is benign- not cancerous). Further evaluation and discussion with orthopedics to determine next steps.

## 2022-09-19 ENCOUNTER — VIRTUAL VISIT (OUTPATIENT)
Dept: BEHAVIORAL HEALTH | Facility: CLINIC | Age: 72
End: 2022-09-19
Payer: MEDICARE

## 2022-09-19 DIAGNOSIS — F41.1 GENERALIZED ANXIETY DISORDER: Primary | ICD-10-CM

## 2022-09-19 PROCEDURE — 90837 PSYTX W PT 60 MINUTES: CPT | Mod: 95 | Performed by: SOCIAL WORKER

## 2022-09-19 ASSESSMENT — COLUMBIA-SUICIDE SEVERITY RATING SCALE - C-SSRS
2. HAVE YOU ACTUALLY HAD ANY THOUGHTS OF KILLING YOURSELF?: NO
TOTAL  NUMBER OF ABORTED OR SELF INTERRUPTED ATTEMPTS LIFETIME: NO
1. HAVE YOU WISHED YOU WERE DEAD OR WISHED YOU COULD GO TO SLEEP AND NOT WAKE UP?: NO
TOTAL  NUMBER OF INTERRUPTED ATTEMPTS LIFETIME: NO
ATTEMPT LIFETIME: NO
6. HAVE YOU EVER DONE ANYTHING, STARTED TO DO ANYTHING, OR PREPARED TO DO ANYTHING TO END YOUR LIFE?: NO

## 2022-09-19 NOTE — Clinical Note
She's upset you can't be her provider, Hina.  Looks like she saw Swati in 2020.  I'll be doing DA next visit but she's definitely anxious, some trauma in her hx, not well-treated son with mental illness.  Cyndi
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
## 2022-10-03 ENCOUNTER — OFFICE VISIT (OUTPATIENT)
Dept: ORTHOPEDICS | Facility: CLINIC | Age: 72
End: 2022-10-03
Attending: PHYSICIAN ASSISTANT
Payer: MEDICARE

## 2022-10-03 DIAGNOSIS — M22.8X2 PATELLAR TRACKING DISORDER OF LEFT KNEE: Primary | ICD-10-CM

## 2022-10-03 DIAGNOSIS — M25.562 ACUTE PAIN OF BOTH KNEES: ICD-10-CM

## 2022-10-03 DIAGNOSIS — M25.561 ACUTE PAIN OF BOTH KNEES: ICD-10-CM

## 2022-10-03 DIAGNOSIS — M22.8X1 PATELLAR TRACKING DISORDER OF RIGHT KNEE: ICD-10-CM

## 2022-10-03 PROCEDURE — 99203 OFFICE O/P NEW LOW 30 MIN: CPT | Performed by: PHYSICIAN ASSISTANT

## 2022-10-03 NOTE — PATIENT INSTRUCTIONS
We addressed patella tracking issues today.    Cares:  - avoid activities that aggravate the knee such as kneeling, climbing (stairs/ladders).  -You may ice the knee for up to 20 minutes after aggravating activities.  -Topical gel applied to the knees up to 4 times a day.  -You may also take NSAIDs such as Aleve.  Following dosing instructions per the bottle.    Physical therapy should contact you to schedule, please wait 2 to 3 weeks for symptoms to improve.  Physical therapy should start to show some improvement after 6 to 8 weeks.    Please follow-up if pain is not improving with care interventions, and then at the conclusion of physical therapy if you are not seeing improvement.    Adam Wells PA-C  [facility] - Surgery  [facility]  [address] [place]  [address]  [phone] fax  [phone] for scheduling

## 2022-10-03 NOTE — LETTER
10/3/2022         RE: Jeannine Carpio  [address] [facility] [address]        Dear Colleague,    Thank you for referring your patient, Jeannine Carpio, to the [facility] [place]. Please see a copy of my visit note below.    HISTORY OF PRESENT ILLNESS:    Jeannine Carpio is a 72 year old female who is seen in consultation at the request of Dr. Gabriel for bilateral knee pain    Present symptoms:  Symptoms began several month(s) ago.  Reports insidious onset without acute precipitating event.  She reports aching bilateral left worse than right knee pain that is located diffusely at the front; and extends proximally and distally slightly however true radiation Present Absent.  Symptoms are generally constant in nature. Pain is 8/10 in maximal severity, and 2/10 currently at rest.  Symptoms are generally worse with/at knee loaded activity/sitting for long periods; better with/at rest.   Overall the patient feels the condition is starting to worsening. Other treatment so far has consisted of occasional over-the-counter pain with minimal relief.  Associated symptoms: has not had  locking, or catching; denies buckling episodes or instability sensation.  She denies history of similar or related problems.  Or trauma to the knee.  Patient is not currently working however daughter states she is constantly moving.  Orthopedic PMH: None known.  Past Medical History:   Diagnosis Date     Abnormal glandular Papanicolaou smear of cervix     cryo and LEEP done, normal since     Delivery normal      1 miscarriage     Menopause age 54       Past Surgical History:   Procedure Laterality Date     LEEP TX, CERVICAL      LEEP TX Cervical       Family History   Problem Relation Age of Onset     Diabetes Mother      Cerebrovascular Disease Mother      Arthritis Mother      Eye Disorder Mother         Cataracts     Heart Disease Mother         CHF, A-Fib     Osteoporosis Mother      Thyroid Disease  Mother         Hypothyroidism     Alzheimer Disease Father      Eye Disorder Father         Cataracts     Heart Disease Father         Bypass     Thyroid Disease Maternal Grandmother      Thyroid Disease Brother         Hyperthyroidism     Alcoholism Brother      Depression Sister      Thyroid Disease Sister      Thyroid Disease Daughter         Hypothyroid       Social History     Socioeconomic History     Marital status:      Spouse name: Damon     Number of children: 6     Years of education: Not on file     Highest education level: 12th grade   Occupational History     Occupation: packagaing line, [employer]     Employer: [employer]   Tobacco Use     Smoking status: Current Every Day Smoker     Packs/day: 0.50     Types: Cigarettes     Smokeless tobacco: Never Used   Vaping Use     Vaping Use: Never used   Substance and Sexual Activity     Alcohol use: No     Drug use: No     Sexual activity: Not Currently     Partners: Male   Other Topics Concern     Parent/sibling w/ CABG, MI or angioplasty before 65F 55M? Not Asked   Social History Narrative     Not on file     Social Determinants of Health     Financial Resource Strain: Low Risk      Difficulty of Paying Living Expenses: Not very hard   Food Insecurity: Unknown     Worried About Running Out of Food in the Last Year: Patient refused     Ran Out of Food in the Last Year: Never true   Transportation Needs: No Transportation Needs     Lack of Transportation (Medical): No     Lack of Transportation (Non-Medical): No   Physical Activity: Insufficiently Active     Days of Exercise per Week: 2 days     Minutes of Exercise per Session: 30 min   Stress: Stress Concern Present     Feeling of Stress : To some extent   Social Connections: Moderately Isolated     Frequency of Communication with Friends and Family: More than three times a week     Frequency of Social Gatherings with Friends and Family: Once a week     Attends Scientologist Services: More than 4 times per  year     Active Member of Clubs or Organizations: No     Attends Club or Organization Meetings: Not on file     Marital Status:    Intimate Partner Violence: Not on file   Housing Stability: Low Risk      Unable to Pay for Housing in the Last Year: No     Number of Places Lived in the Last Year: 1     Unstable Housing in the Last Year: No       Current Outpatient Medications   Medication Sig Dispense Refill     diclofenac (VOLTAREN) 1 % topical gel Apply 2-4 g topically 4 times daily 350 g 0     albuterol (PROAIR HFA/PROVENTIL HFA/VENTOLIN HFA) 108 (90 Base) MCG/ACT inhaler Inhale 2 puffs into the lungs every 6 hours 8.5 g 4     Cholecalciferol (VITAMIN D) 2000 UNIT tablet Take 1,000 Units by mouth daily        ciclopirox (LOPROX) 0.77 % cream Apply topically daily Apply to affected nails daily at night 30 g 6     levothyroxine (SYNTHROID/LEVOTHROID) 25 MCG tablet Take 1 tablet (25 mcg) by mouth daily 90 tablet 3     OMEPRAZOLE PO        pravastatin (PRAVACHOL) 10 MG tablet Take 1 tablet (10 mg) by mouth daily 90 tablet 3       Allergies   Allergen Reactions     Ibuprofen Hives     Penicillins Hives     Tetracyclines Hives       Patient's past medical, surgical, social and family histories are reviewed today.  Medical history includes: Emphysema, tobacco dependence, malnutrition, hypothyroidism, depression, social anxiety disorder, history of alcoholism, vitamin D insufficiency.  REVIEW OF SYSTEMS: No new changes to the following.  CONSTITUTIONAL:  NEGATIVE for fever, chills, change in weight  INTEGUMENTARY/SKIN:  NEGATIVE for worrisome rashes, moles or lesions  EYES:  NEGATIVE for vision changes or irritation  ENT/MOUTH:  NEGATIVE for ear, mouth and throat problems  RESP:  NEGATIVE for significant cough or SOB  BREAST:  NEGATIVE for masses, tenderness or discharge  CV:  NEGATIVE for chest pain, palpitations or peripheral edema  GI:  NEGATIVE for nausea, abdominal pain, heartburn, or change in bowel  "habits  :  Negative   MUSCULOSKELETAL:  See HPI above  NEURO:  NEGATIVE for weakness, dizziness or paresthesias  ENDOCRINE:  NEGATIVE for temperature intolerance, skin/hair changes  HEME/ALLERGY/IMMUNE:  NEGATIVE for bleeding problems  PSYCHIATRIC:  NEGATIVE for changes in mood or affect      PHYSICAL EXAM:  There were no vitals taken for this visit.  On 9/13/2022 at primary care visit:/60   Pulse 87   Temp 97.6  F (36.4  C) (Oral)   Ht 1.651 m (5' 5\")   Wt 45.2 kg (99 lb 9.6 oz)   SpO2 98%   BMI 16.57 kg/m    Body mass index is 16.57 kg/m .    GENERAL APPEARANCE: Mildly malnourished however well kept and energetic.  NEURO: She is alert and oriented x 3, mentation intact and speech normal  POSTURE: Stands with normal weight bearing line.  PSYCH:  mentation appears normal and affect normal/bright    MUSCULOSKELETAL: Examination of bilateral knees.  GROSS OBSERVATION: Generalized atrophy of musculature symmetrically.  No scars lesions or gross deformities.  PALPATION: Patient is tender about the kneecap and diffusely about the knee without point tenderness.  ROM:  Right: Flexion 0-1 20.  Left: Symmetric  LIGAMENTOUS: Grossly intact to all planes.  STRENGTH: Extension mildly restricted left worse than right with pain at the patella.  SPECIAL TESTS: Patellar grind positive for pain.  Kim's negative, pivot shift negative, Lachman's negative.    SKIN: , no lesions, erythema noted bilaterally.  VASCULATURE:  Good capillary refill bilaterally.  SENSATION: Light touch intact bilaterally to lower legs, otherwise no gross deficits noted.   COORDINATION:  Able to move joint within above stated ROM with good control.    Imaging Interpretation:   4 images of the  knees from 9/13/2022: There is lateral positioning of the patella bilaterally on sunrise views.  Joint spaces are maintained throughout, and there is a probable osteochondroma at the lateral proximal tibia on the left.  There is a density behind the " right femur condyle in the region of flabella.  No other signs of acute fracture.    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 Acute pain of both knees    3. Patellar tracking disorder of right knee      Quadriceps weakness.    PLAN:  We discussed a 2 part plan: Controlling inflammation and working on patellar tracking.  To control inflammation she is allergic to ibuprofen and does not want a cortisone injection at this time.    1.  Reducing inflammation: Voltaren gel, avoid aggravating activities were loaded knee/weightbearing  2.  Patella tracking, consider bracing if not improved, therapy for now, may start after symptoms improve.    Follow up in clinic after 6 weeks of therapy or if pain is not resolving by the start of therapy.    A total of 30 minutes spent with patient on care and care coordinating activities.      Adam Wells PA-C  [facility] - Surgery        Again, thank you for allowing me to participate in the care of your patient.        Sincerely,        Adam Wells PA-C

## 2022-10-03 NOTE — PROGRESS NOTES
HISTORY OF PRESENT ILLNESS:    Jeannine Carpio is a 72 year old female who is seen in consultation at the request of Dr. Gabriel for bilateral knee pain    Present symptoms:  Symptoms began several month(s) ago.  Reports insidious onset without acute precipitating event.  She reports aching bilateral left worse than right knee pain that is located diffusely at the front; and extends proximally and distally slightly however true radiation Present Absent.  Symptoms are generally constant in nature. Pain is 8/10 in maximal severity, and 2/10 currently at rest.  Symptoms are generally worse with/at knee loaded activity/sitting for long periods; better with/at rest.   Overall the patient feels the condition is starting to worsening. Other treatment so far has consisted of occasional over-the-counter pain with minimal relief.  Associated symptoms: has not had  locking, or catching; denies buckling episodes or instability sensation.  She denies history of similar or related problems.  Or trauma to the knee.  Patient is not currently working however daughter states she is constantly moving.  Orthopedic PMH: None known.  Past Medical History:   Diagnosis Date     Abnormal glandular Papanicolaou smear of cervix     cryo and LEEP done, normal since     Delivery normal      1 miscarriage     Menopause age 54       Past Surgical History:   Procedure Laterality Date     LEEP TX, CERVICAL      LEEP TX Cervical       Family History   Problem Relation Age of Onset     Diabetes Mother      Cerebrovascular Disease Mother      Arthritis Mother      Eye Disorder Mother         Cataracts     Heart Disease Mother         CHF, A-Fib     Osteoporosis Mother      Thyroid Disease Mother         Hypothyroidism     Alzheimer Disease Father      Eye Disorder Father         Cataracts     Heart Disease Father         Bypass     Thyroid Disease Maternal Grandmother      Thyroid Disease Brother         Hyperthyroidism     Alcoholism Brother       Depression Sister      Thyroid Disease Sister      Thyroid Disease Daughter         Hypothyroid       Social History     Socioeconomic History     Marital status:      Spouse name: Damon     Number of children: 6     Years of education: Not on file     Highest education level: 12th grade   Occupational History     Occupation: packagaing line, factory     Employer: [employer]   Tobacco Use     Smoking status: Current Every Day Smoker     Packs/day: 0.50     Types: Cigarettes     Smokeless tobacco: Never Used   Vaping Use     Vaping Use: Never used   Substance and Sexual Activity     Alcohol use: No     Drug use: No     Sexual activity: Not Currently     Partners: Male   Other Topics Concern     Parent/sibling w/ CABG, MI or angioplasty before 65F 55M? Not Asked   Social History Narrative     Not on file     Social Determinants of Health     Financial Resource Strain: Low Risk      Difficulty of Paying Living Expenses: Not very hard   Food Insecurity: Unknown     Worried About Running Out of Food in the Last Year: Patient refused     Ran Out of Food in the Last Year: Never true   Transportation Needs: No Transportation Needs     Lack of Transportation (Medical): No     Lack of Transportation (Non-Medical): No   Physical Activity: Insufficiently Active     Days of Exercise per Week: 2 days     Minutes of Exercise per Session: 30 min   Stress: Stress Concern Present     Feeling of Stress : To some extent   Social Connections: Moderately Isolated     Frequency of Communication with Friends and Family: More than three times a week     Frequency of Social Gatherings with Friends and Family: Once a week     Attends Hinduism Services: More than 4 times per year     Active Member of Clubs or Organizations: No     Attends Club or Organization Meetings: Not on file     Marital Status:    Intimate Partner Violence: Not on file   Housing Stability: Low Risk      Unable to Pay for Housing in the Last Year: No      Number of Places Lived in the Last Year: 1     Unstable Housing in the Last Year: No       Current Outpatient Medications   Medication Sig Dispense Refill     diclofenac (VOLTAREN) 1 % topical gel Apply 2-4 g topically 4 times daily 350 g 0     albuterol (PROAIR HFA/PROVENTIL HFA/VENTOLIN HFA) 108 (90 Base) MCG/ACT inhaler Inhale 2 puffs into the lungs every 6 hours 8.5 g 4     Cholecalciferol (VITAMIN D) 2000 UNIT tablet Take 1,000 Units by mouth daily        ciclopirox (LOPROX) 0.77 % cream Apply topically daily Apply to affected nails daily at night 30 g 6     levothyroxine (SYNTHROID/LEVOTHROID) 25 MCG tablet Take 1 tablet (25 mcg) by mouth daily 90 tablet 3     OMEPRAZOLE PO        pravastatin (PRAVACHOL) 10 MG tablet Take 1 tablet (10 mg) by mouth daily 90 tablet 3       Allergies   Allergen Reactions     Ibuprofen Hives     Penicillins Hives     Tetracyclines Hives       Patient's past medical, surgical, social and family histories are reviewed today.  Medical history includes: Emphysema, tobacco dependence, malnutrition, hypothyroidism, depression, social anxiety disorder, history of alcoholism, vitamin D insufficiency.  REVIEW OF SYSTEMS: No new changes to the following.  CONSTITUTIONAL:  NEGATIVE for fever, chills, change in weight  INTEGUMENTARY/SKIN:  NEGATIVE for worrisome rashes, moles or lesions  EYES:  NEGATIVE for vision changes or irritation  ENT/MOUTH:  NEGATIVE for ear, mouth and throat problems  RESP:  NEGATIVE for significant cough or SOB  BREAST:  NEGATIVE for masses, tenderness or discharge  CV:  NEGATIVE for chest pain, palpitations or peripheral edema  GI:  NEGATIVE for nausea, abdominal pain, heartburn, or change in bowel habits  :  Negative   MUSCULOSKELETAL:  See HPI above  NEURO:  NEGATIVE for weakness, dizziness or paresthesias  ENDOCRINE:  NEGATIVE for temperature intolerance, skin/hair changes  HEME/ALLERGY/IMMUNE:  NEGATIVE for bleeding problems  PSYCHIATRIC:  NEGATIVE for changes  "in mood or affect      PHYSICAL EXAM:  There were no vitals taken for this visit.  On 9/13/2022 at primary care visit:/60   Pulse 87   Temp 97.6  F (36.4  C) (Oral)   Ht 1.651 m (5' 5\")   Wt 45.2 kg (99 lb 9.6 oz)   SpO2 98%   BMI 16.57 kg/m    Body mass index is 16.57 kg/m .    GENERAL APPEARANCE: Mildly malnourished however well kept and energetic.  NEURO: She is alert and oriented x 3, mentation intact and speech normal  POSTURE: Stands with normal weight bearing line.  PSYCH:  mentation appears normal and affect normal/bright    MUSCULOSKELETAL: Examination of bilateral knees.  GROSS OBSERVATION: Generalized atrophy of musculature symmetrically.  No scars lesions or gross deformities.  PALPATION: Patient is tender about the kneecap and diffusely about the knee without point tenderness.  ROM:  Right: Flexion 0-1 20.  Left: Symmetric  LIGAMENTOUS: Grossly intact to all planes.  STRENGTH: Extension mildly restricted left worse than right with pain at the patella.  SPECIAL TESTS: Patellar grind positive for pain.  Kim's negative, pivot shift negative, Lachman's negative.    SKIN: , no lesions, erythema noted bilaterally.  VASCULATURE:  Good capillary refill bilaterally.  SENSATION: Light touch intact bilaterally to lower legs, otherwise no gross deficits noted.   COORDINATION:  Able to move joint within above stated ROM with good control.    Imaging Interpretation:   4 images of the  knees from 9/13/2022: There is lateral positioning of the patella bilaterally on sunrise views.  Joint spaces are maintained throughout, and there is a probable osteochondroma at the lateral proximal tibia on the left.  There is a density behind the right femur condyle in the region of flabella.  No other signs of acute fracture.    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 Acute pain of both knees    3. Patellar tracking disorder of right knee      Quadriceps weakness.    PLAN:  We discussed a 2 " part plan: Controlling inflammation and working on patellar tracking.  To control inflammation she is allergic to ibuprofen and does not want a cortisone injection at this time.    1.  Reducing inflammation: Voltaren gel, avoid aggravating activities were loaded knee/weightbearing  2.  Patella tracking, consider bracing if not improved, therapy for now, may start after symptoms improve.    Follow up in clinic after 6 weeks of therapy or if pain is not resolving by the start of therapy.    A total of 30 minutes spent with patient on care and care coordinating activities.      Adam Wells PA-C  [facility] - Surgery

## 2022-11-06 DIAGNOSIS — E78.00 HYPERCHOLESTEROLEMIA: ICD-10-CM

## 2022-11-08 RX ORDER — PRAVASTATIN SODIUM 10 MG
10 TABLET ORAL DAILY
Qty: 90 TABLET | Refills: 0 | Status: SHIPPED | OUTPATIENT
Start: 2022-11-08 | End: 2023-02-12

## 2022-11-08 NOTE — TELEPHONE ENCOUNTER
Prescription approved per [facility] Refill Protocol.  Sharon Gallegos, RN, BSN, PHN  [facility]

## 2022-11-21 ENCOUNTER — OFFICE VISIT (OUTPATIENT)
Dept: FAMILY MEDICINE | Facility: CLINIC | Age: 72
End: 2022-11-21
Payer: MEDICARE

## 2022-11-21 VITALS
WEIGHT: 99.9 LBS | SYSTOLIC BLOOD PRESSURE: 126 MMHG | TEMPERATURE: 97.8 F | HEART RATE: 100 BPM | BODY MASS INDEX: 17.05 KG/M2 | HEIGHT: 64 IN | DIASTOLIC BLOOD PRESSURE: 64 MMHG | OXYGEN SATURATION: 97 %

## 2022-11-21 DIAGNOSIS — E78.00 HYPERCHOLESTEROLEMIA: ICD-10-CM

## 2022-11-21 DIAGNOSIS — E03.9 HYPOTHYROIDISM, UNSPECIFIED TYPE: ICD-10-CM

## 2022-11-21 DIAGNOSIS — Z12.31 ENCOUNTER FOR SCREENING MAMMOGRAM FOR BREAST CANCER: ICD-10-CM

## 2022-11-21 DIAGNOSIS — Z00.00 ENCOUNTER FOR ANNUAL WELLNESS VISIT (AWV) IN MEDICARE PATIENT: Primary | ICD-10-CM

## 2022-11-21 DIAGNOSIS — M54.2 CERVICALGIA: ICD-10-CM

## 2022-11-21 DIAGNOSIS — Z12.11 SCREEN FOR COLON CANCER: ICD-10-CM

## 2022-11-21 DIAGNOSIS — E46 PROTEIN-CALORIE MALNUTRITION, UNSPECIFIED SEVERITY (H): ICD-10-CM

## 2022-11-21 DIAGNOSIS — R73.03 PREDIABETES: ICD-10-CM

## 2022-11-21 LAB
ALBUMIN SERPL BCG-MCNC: 4.5 G/DL (ref 3.5–5.2)
ALP SERPL-CCNC: 83 U/L (ref 35–104)
ALT SERPL W P-5'-P-CCNC: 13 U/L (ref 10–35)
ANION GAP SERPL CALCULATED.3IONS-SCNC: 13 MMOL/L (ref 7–15)
AST SERPL W P-5'-P-CCNC: 25 U/L (ref 10–35)
BASOPHILS # BLD AUTO: 0.1 10E3/UL (ref 0–0.2)
BASOPHILS NFR BLD AUTO: 1 %
BILIRUB SERPL-MCNC: 0.6 MG/DL
BUN SERPL-MCNC: 12.5 MG/DL (ref 8–23)
CALCIUM SERPL-MCNC: 9.6 MG/DL (ref 8.8–10.2)
CHLORIDE SERPL-SCNC: 102 MMOL/L (ref 98–107)
CHOLEST SERPL-MCNC: 182 MG/DL
CREAT SERPL-MCNC: 0.65 MG/DL (ref 0.51–0.95)
DEPRECATED HCO3 PLAS-SCNC: 24 MMOL/L (ref 22–29)
EOSINOPHIL # BLD AUTO: 0.2 10E3/UL (ref 0–0.7)
EOSINOPHIL NFR BLD AUTO: 3 %
ERYTHROCYTE [DISTWIDTH] IN BLOOD BY AUTOMATED COUNT: 13.2 % (ref 10–15)
GFR SERPL CREATININE-BSD FRML MDRD: >90 ML/MIN/1.73M2
GLUCOSE SERPL-MCNC: 107 MG/DL (ref 70–99)
HBA1C MFR BLD: 5.8 % (ref 0–5.6)
HCT VFR BLD AUTO: 47 % (ref 35–47)
HDLC SERPL-MCNC: 52 MG/DL
HGB BLD-MCNC: 15.7 G/DL (ref 11.7–15.7)
LDLC SERPL CALC-MCNC: 104 MG/DL
LYMPHOCYTES # BLD AUTO: 1.5 10E3/UL (ref 0.8–5.3)
LYMPHOCYTES NFR BLD AUTO: 22 %
MCH RBC QN AUTO: 30.3 PG (ref 26.5–33)
MCHC RBC AUTO-ENTMCNC: 33.4 G/DL (ref 31.5–36.5)
MCV RBC AUTO: 91 FL (ref 78–100)
MONOCYTES # BLD AUTO: 0.7 10E3/UL (ref 0–1.3)
MONOCYTES NFR BLD AUTO: 10 %
NEUTROPHILS # BLD AUTO: 4.5 10E3/UL (ref 1.6–8.3)
NEUTROPHILS NFR BLD AUTO: 64 %
NONHDLC SERPL-MCNC: 130 MG/DL
PLATELET # BLD AUTO: 211 10E3/UL (ref 150–450)
POTASSIUM SERPL-SCNC: 4.2 MMOL/L (ref 3.4–5.3)
PROT SERPL-MCNC: 7 G/DL (ref 6.4–8.3)
RBC # BLD AUTO: 5.19 10E6/UL (ref 3.8–5.2)
SODIUM SERPL-SCNC: 139 MMOL/L (ref 136–145)
T4 FREE SERPL-MCNC: 1.46 NG/DL (ref 0.9–1.7)
TRIGL SERPL-MCNC: 129 MG/DL
TSH SERPL DL<=0.005 MIU/L-ACNC: 4.88 UIU/ML (ref 0.3–4.2)
WBC # BLD AUTO: 7 10E3/UL (ref 4–11)

## 2022-11-21 PROCEDURE — 84439 ASSAY OF FREE THYROXINE: CPT | Performed by: PHYSICIAN ASSISTANT

## 2022-11-21 PROCEDURE — 80061 LIPID PANEL: CPT | Performed by: PHYSICIAN ASSISTANT

## 2022-11-21 PROCEDURE — G0439 PPPS, SUBSEQ VISIT: HCPCS | Performed by: PHYSICIAN ASSISTANT

## 2022-11-21 PROCEDURE — 80050 GENERAL HEALTH PANEL: CPT | Performed by: PHYSICIAN ASSISTANT

## 2022-11-21 PROCEDURE — 99213 OFFICE O/P EST LOW 20 MIN: CPT | Mod: 25 | Performed by: PHYSICIAN ASSISTANT

## 2022-11-21 PROCEDURE — 83036 HEMOGLOBIN GLYCOSYLATED A1C: CPT | Performed by: PHYSICIAN ASSISTANT

## 2022-11-21 PROCEDURE — 36415 COLL VENOUS BLD VENIPUNCTURE: CPT | Performed by: PHYSICIAN ASSISTANT

## 2022-11-21 ASSESSMENT — ENCOUNTER SYMPTOMS
NAUSEA: 0
DYSURIA: 0
DIZZINESS: 0
HEADACHES: 0
PALPITATIONS: 0
MYALGIAS: 1
NERVOUS/ANXIOUS: 0
SHORTNESS OF BREATH: 0
HEMATOCHEZIA: 0
FEVER: 0
ABDOMINAL PAIN: 0
CONSTIPATION: 1
CHILLS: 0
COUGH: 1
EYE PAIN: 1
PARESTHESIAS: 0
JOINT SWELLING: 0
BREAST MASS: 0
DIARRHEA: 0
FREQUENCY: 0
WEAKNESS: 0
HEMATURIA: 0
ARTHRALGIAS: 1
HEARTBURN: 0
SORE THROAT: 0

## 2022-11-21 ASSESSMENT — PATIENT HEALTH QUESTIONNAIRE - PHQ9
10. IF YOU CHECKED OFF ANY PROBLEMS, HOW DIFFICULT HAVE THESE PROBLEMS MADE IT FOR YOU TO DO YOUR WORK, TAKE CARE OF THINGS AT HOME, OR GET ALONG WITH OTHER PEOPLE: SOMEWHAT DIFFICULT
SUM OF ALL RESPONSES TO PHQ QUESTIONS 1-9: 13
SUM OF ALL RESPONSES TO PHQ QUESTIONS 1-9: 13

## 2022-11-21 ASSESSMENT — ANXIETY QUESTIONNAIRES
GAD7 TOTAL SCORE: 16
4. TROUBLE RELAXING: NEARLY EVERY DAY
GAD7 TOTAL SCORE: 16
6. BECOMING EASILY ANNOYED OR IRRITABLE: SEVERAL DAYS
8. IF YOU CHECKED OFF ANY PROBLEMS, HOW DIFFICULT HAVE THESE MADE IT FOR YOU TO DO YOUR WORK, TAKE CARE OF THINGS AT HOME, OR GET ALONG WITH OTHER PEOPLE?: SOMEWHAT DIFFICULT
2. NOT BEING ABLE TO STOP OR CONTROL WORRYING: NEARLY EVERY DAY
5. BEING SO RESTLESS THAT IT IS HARD TO SIT STILL: NEARLY EVERY DAY
7. FEELING AFRAID AS IF SOMETHING AWFUL MIGHT HAPPEN: NOT AT ALL
7. FEELING AFRAID AS IF SOMETHING AWFUL MIGHT HAPPEN: NOT AT ALL
IF YOU CHECKED OFF ANY PROBLEMS ON THIS QUESTIONNAIRE, HOW DIFFICULT HAVE THESE PROBLEMS MADE IT FOR YOU TO DO YOUR WORK, TAKE CARE OF THINGS AT HOME, OR GET ALONG WITH OTHER PEOPLE: SOMEWHAT DIFFICULT
1. FEELING NERVOUS, ANXIOUS, OR ON EDGE: NEARLY EVERY DAY
GAD7 TOTAL SCORE: 16
3. WORRYING TOO MUCH ABOUT DIFFERENT THINGS: NEARLY EVERY DAY

## 2022-11-21 ASSESSMENT — ACTIVITIES OF DAILY LIVING (ADL): CURRENT_FUNCTION: NO ASSISTANCE NEEDED

## 2022-11-21 NOTE — LETTER
November 29, 2022      Jeannine Carpio  [address] [id number]        Dear ,    We are writing to inform you of your test results.    The Fecal Occult Blood testing was NEGATIVE for any occult blood in your stool, which means there is a very low chance of colon cancer at this time. It is recommended to repeat this test yearly.    Resulted Orders   Fecal colorectal cancer screen FIT - Future (S+30)   Result Value Ref Range    Occult Blood Screen FIT Negative Negative   TSH with free T4 reflex   Result Value Ref Range    TSH 4.88 (H) 0.30 - 4.20 uIU/mL   Hemoglobin A1c   Result Value Ref Range    Hemoglobin A1C 5.8 (H) 0.0 - 5.6 %      Comment:      Normal <5.7%   Prediabetes 5.7-6.4%    Diabetes 6.5% or higher     Note: Adopted from ADA consensus guidelines.   Lipid panel reflex to direct LDL Fasting   Result Value Ref Range    Cholesterol 182 <200 mg/dL    Triglycerides 129 <150 mg/dL    Direct Measure HDL 52 >=50 mg/dL    LDL Cholesterol Calculated 104 (H) <=100 mg/dL    Non HDL Cholesterol 130 (H) <130 mg/dL    Narrative    Cholesterol  Desirable:  <200 mg/dL    Triglycerides  Normal:  Less than 150 mg/dL  Borderline High:  150-199 mg/dL  High:  200-499 mg/dL  Very High:  Greater than or equal to 500 mg/dL    Direct Measure HDL  Female:  Greater than or equal to 50 mg/dL   Male:  Greater than or equal to 40 mg/dL    LDL Cholesterol  Desirable:  <100mg/dL  Above Desirable:  100-129 mg/dL   Borderline High:  130-159 mg/dL   High:  160-189 mg/dL   Very High:  >= 190 mg/dL    Non HDL Cholesterol  Desirable:  130 mg/dL  Above Desirable:  130-159 mg/dL  Borderline High:  160-189 mg/dL  High:  190-219 mg/dL  Very High:  Greater than or equal to 220 mg/dL   Comprehensive metabolic panel (BMP + Alb, Alk Phos, ALT, AST, Total. Bili, TP)   Result Value Ref Range    Sodium 139 136 - 145 mmol/L    Potassium 4.2 3.4 - 5.3 mmol/L    Chloride 102 98 - 107 mmol/L    Carbon Dioxide (CO2) 24 22 - 29 mmol/L     Anion Gap 13 7 - 15 mmol/L    Urea Nitrogen 12.5 8.0 - 23.0 mg/dL    Creatinine 0.65 0.51 - 0.95 mg/dL    Calcium 9.6 8.8 - 10.2 mg/dL    Glucose 107 (H) 70 - 99 mg/dL    Alkaline Phosphatase 83 35 - 104 U/L    AST 25 10 - 35 U/L    ALT 13 10 - 35 U/L    Protein Total 7.0 6.4 - 8.3 g/dL    Albumin 4.5 3.5 - 5.2 g/dL    Bilirubin Total 0.6 <=1.2 mg/dL    GFR Estimate >90 >60 mL/min/1.73m2      Comment:      Effective December 21, 2021 eGFRcr in adults is calculated using the 2021 CKD-EPI creatinine equation which includes age and gender (Robby et al., NE, DOI: 10.1056/GFIPqx2587250)   CBC with platelets and differential   Result Value Ref Range    WBC Count 7.0 4.0 - 11.0 10e3/uL    RBC Count 5.19 3.80 - 5.20 10e6/uL    Hemoglobin 15.7 11.7 - 15.7 g/dL    Hematocrit 47.0 35.0 - 47.0 %    MCV 91 78 - 100 fL    MCH 30.3 26.5 - 33.0 pg    MCHC 33.4 31.5 - 36.5 g/dL    RDW 13.2 10.0 - 15.0 %    Platelet Count 211 150 - 450 10e3/uL    % Neutrophils 64 %    % Lymphocytes 22 %    % Monocytes 10 %    % Eosinophils 3 %    % Basophils 1 %    Absolute Neutrophils 4.5 1.6 - 8.3 10e3/uL    Absolute Lymphocytes 1.5 0.8 - 5.3 10e3/uL    Absolute Monocytes 0.7 0.0 - 1.3 10e3/uL    Absolute Eosinophils 0.2 0.0 - 0.7 10e3/uL    Absolute Basophils 0.1 0.0 - 0.2 10e3/uL   T4 free   Result Value Ref Range    Free T4 1.46 0.90 - 1.70 ng/dL       If you have any questions or concerns, please call the clinic at the number listed above.       Sincerely,      Dustin Munoz PA-C

## 2022-11-21 NOTE — PROGRESS NOTES
"SUBJECTIVE:   Jeannine is a 72 year old who presents for Preventive Visit.    Patient has been advised of split billing requirements and indicates understanding: Yes  Are you in the first 12 months of your Medicare coverage?  No      Healthy Habits:     In general, how would you rate your overall health?  Fair    Frequency of exercise:  None    Do you usually eat at least 4 servings of fruit and vegetables a day, include whole grains    & fiber and avoid regularly eating high fat or \"junk\" foods?  No    Taking medications regularly:  Yes    Medication side effects:  None    Ability to successfully perform activities of daily living:  No assistance needed    Home Safety:  No safety concerns identified    Hearing Impairment:  Feel that people are mumbling or not speaking clearly and difficulty understanding soft or whispered speech    In the past 6 months, have you been bothered by leaking of urine?  No    In general, how would you rate your overall mental or emotional health?  Fair      PHQ-2 Total Score: 3    Additional concerns today:  Yes      Have you ever done Advance Care Planning? (For example, a Health Directive, POLST, or a discussion with a medical provider or your loved ones about your wishes): No, advance care planning information given to patient to review.  Patient plans to discuss their wishes with loved ones or provider.        Fall risk  Fallen 2 or more times in the past year?: No  Any fall with injury in the past year?: No  click delete button to remove this line now    Cognitive Screening   1) Repeat 3 items (Leader, Season, Table)    2) Clock draw: NORMAL  3) 3 item recall: Recalls NO objects   Results: NORMAL clock, 1-2 items recalled: COGNITIVE IMPAIRMENT LESS LIKELY    Mini-CogTM Copyright DILSHAD Avery. Licensed by the author for use in [facility]; reprinted with permission (amber@.[facility]). All rights reserved.      Do you have sleep apnea, excessive snoring or daytime drowsiness?: Pt " reports that she has been told that she does snore    Reviewed and updated as needed this visit by clinical staff   Tobacco   Meds              Reviewed and updated as needed this visit by Provider                 Social History     Tobacco Use     Smoking status: Every Day     Packs/day: 0.50     Types: Cigarettes     Smokeless tobacco: Never     Tobacco comments:     12 cigarettes a day   Substance Use Topics     Alcohol use: No     If you drink alcohol do you typically have >3 drinks per day or >7 drinks per week? No    Alcohol Use 11/21/2022   Prescreen: >3 drinks/day or >7 drinks/week? Not Applicable   Prescreen: >3 drinks/day or >7 drinks/week? -       Neck Pain      Duration: 2 weeks    Description (location/character/radiation): right side of neck    Intensity:  moderate    Accompanying signs and symptoms: pain radiates over to right shoulder and down right arm    History (similar episodes/previous evaluation): None    Precipitating or alleviating factors: None- cold air increases pain, movements worsen pain    Therapies tried and outcome: None       Current providers sharing in care for this patient include:   Patient Care Team:  Haase, Susan Rachele, APRN CNP as PCP - General (Nurse Practitioner)  Haase, Susan Rachele, APRN CNP as Referring Physician (Family Medicine)  Cyndi Adam PA-C as Physician Assistant (Dermatology)  Adam Wells PA-C as Assigned Musculoskeletal Provider  Haase, Susan Rachele, APRN CNP as Assigned PCP    The following health maintenance items are reviewed in Epic and correct as of today:  Health Maintenance   Topic Date Due     DEXA  Never done     COPD ACTION PLAN  Never done     Pneumococcal Vaccine: 65+ Years (1 - PCV) Never done     HEPATITIS C SCREENING  Never done     ZOSTER IMMUNIZATION (1 of 2) Never done     LUNG CANCER SCREENING  11/14/2020     COVID-19 Vaccine (3 - Booster for Pfizer series) 06/16/2021     COLORECTAL CANCER SCREENING  11/17/2021      "INFLUENZA VACCINE (1) Never done     ANNUAL REVIEW OF HM ORDERS  11/11/2022     MEDICARE ANNUAL WELLNESS VISIT  11/11/2022     MAMMO SCREENING  11/27/2022     PHQ-9  05/21/2023     NICOTINE/TOBACCO CESSATION COUNSELING Q 1 YR  11/21/2023     FALL RISK ASSESSMENT  11/21/2023     LIPID  11/11/2026     ADVANCE CARE PLANNING  11/11/2026     DTAP/TDAP/TD IMMUNIZATION (3 - Td or Tdap) 10/13/2030     SPIROMETRY  Completed     DEPRESSION ACTION PLAN  Completed     IPV IMMUNIZATION  Aged Out     MENINGITIS IMMUNIZATION  Aged Out         Review of Systems   Constitutional: Negative for chills and fever.   HENT: Positive for congestion and hearing loss. Negative for ear pain and sore throat.    Eyes: Positive for pain and visual disturbance.   Respiratory: Positive for cough. Negative for shortness of breath.    Cardiovascular: Negative for chest pain, palpitations and peripheral edema.   Gastrointestinal: Positive for constipation. Negative for abdominal pain, diarrhea, heartburn, hematochezia and nausea.   Breasts:  Negative for tenderness, breast mass and discharge.   Genitourinary: Positive for urgency. Negative for dysuria, frequency, genital sores, hematuria, pelvic pain, vaginal bleeding and vaginal discharge.   Musculoskeletal: Positive for arthralgias and myalgias. Negative for joint swelling.   Skin: Negative for rash.   Neurological: Negative for dizziness, weakness, headaches and paresthesias.   Psychiatric/Behavioral: Positive for mood changes. The patient is not nervous/anxious.      OBJECTIVE:   /64 (BP Location: Right arm, Patient Position: Sitting, Cuff Size: Adult Regular)   Pulse 100   Temp 97.8  F (36.6  C) (Oral)   Ht 1.632 m (5' 4.25\")   Wt 45.3 kg (99 lb 14.4 oz)   SpO2 97%   BMI 17.01 kg/m   Estimated body mass index is 17.01 kg/m  as calculated from the following:    Height as of this encounter: 1.632 m (5' 4.25\").    Weight as of this encounter: 45.3 kg (99 lb 14.4 oz).       Physical " Exam  GENERAL: healthy, alert and no distress  EYES: Eyes grossly normal to inspection, PERRL and conjunctivae and sclerae normal  NECK: no adenopathy, no asymmetry, masses, or scars and thyroid normal to palpation  RESP: lungs clear to auscultation - no rales, rhonchi or wheezes  CV: regular rate and rhythm, normal S1 S2, no S3 or S4, no murmur, click or rub, no peripheral edema and peripheral pulses strong  MS: no gross musculoskeletal defects noted, no edema  SKIN: no suspicious lesions or rashes  NEURO: Normal strength and tone, mentation intact and speech normal  PSYCH: mentation appears normal, affect normal/bright  Neck: Tender to palpation of right side of neck and right sided cervical paraspinal muscles. ROM reduced due to pain.    Diagnostic Test Results:  Labs reviewed in Epic    ASSESSMENT / PLAN:   (Z00.00) Encounter for annual wellness visit (AWV) in Medicare patient  (primary encounter diagnosis)    Comment: Normal exam. Continue to monitor cognitive function. Family history of Alzheimer's.     Plan: Hemoglobin A1c, Lipid panel reflex to direct         LDL Fasting, Comprehensive metabolic panel (BMP        + Alb, Alk Phos, ALT, AST, Total. Bili, TP),         CBC with platelets and differential            (M54.2) Cervicalgia    Comment: Discussed possible muscle relaxer at bedtime. Patient prefers to try Tylenol and heat first. Will call if not improving to try muscle relaxer.    Plan: See above.      (R73.03) Prediabetes    Comment: Screen for diabetes. Has been in prediabetic range before.    Plan: Hemoglobin A1c            (E03.9) Hypothyroidism, unspecified type    Comment: Recheck labs. Will then refill medication.    Plan: TSH with free T4 reflex, Comprehensive         metabolic panel (BMP + Alb, Alk Phos, ALT, AST,        Total. Bili, TP), CBC with platelets and         differential            (E46) Protein-calorie malnutrition, unspecified severity (H)    Comment: Weight stable. Continue to  drink Boost 1-2 times a day and eat plenty of protein.    Plan: See above.      (E78.00) Hypercholesterolemia    Comment: Recheck labs.    Plan: Hemoglobin A1c, Lipid panel reflex to direct         LDL Fasting, Comprehensive metabolic panel (BMP        + Alb, Alk Phos, ALT, AST, Total. Bili, TP),         CBC with platelets and differential            (Z12.11) Screen for colon cancer    Comment: Patient agrees to FIT test today.    Plan: Fecal colorectal cancer screen FIT - Future         (S+30)            (Z12.31) Encounter for screening mammogram for breast cancer    Comment: Patient declines further mammograms.      Patient has been advised of split billing requirements and indicates understanding: Yes      COUNSELING:  Reviewed preventive health counseling, as reflected in patient instructions        She reports that she has been smoking cigarettes. She has been smoking an average of .5 packs per day. She has never used smokeless tobacco.  Nicotine/Tobacco Cessation Plan:   Information offered: Patient not interested at this time      Appropriate preventive services were discussed with this patient, including applicable screening as appropriate for cardiovascular disease, diabetes, osteopenia/osteoporosis, and glaucoma.  As appropriate for age/gender, discussed screening for colorectal cancer, prostate cancer, breast cancer, and cervical cancer. Checklist reviewing preventive services available has been given to the patient.    Reviewed patients plan of care and provided an AVS. The Basic Care Plan (routine screening as documented in Health Maintenance) for Jeannine meets the Care Plan requirement. This Care Plan has been established and reviewed with the Patient.          Dustin Munoz PA-C  [facility]    Identified Health Risks:  Answers for HPI/ROS submitted by the patient on 11/21/2022  If you checked off any problems, how difficult have these problems made it for you to do your  work, take care of things at home, or get along with other people?: Somewhat difficult  PHQ9 TOTAL SCORE: 13  VERÓNICA 7 TOTAL SCORE: 16

## 2022-11-21 NOTE — LETTER
November 23, 2022      Jeannine Carpio  [address] [facility] [id number]        Dear ,    We are writing to inform you of your test results.    Thyroid levels look good although TSH is slightly high. I recommend that you stay on your same dose. Cholesterol levels have improved. Your A1c is elevated slightly in the prediabetes range. No treatment needed for this. Other labs are all normal.       Resulted Orders   TSH with free T4 reflex   Result Value Ref Range    TSH 4.88 (H) 0.30 - 4.20 uIU/mL   Hemoglobin A1c   Result Value Ref Range    Hemoglobin A1C 5.8 (H) 0.0 - 5.6 %      Comment:      Normal <5.7%   Prediabetes 5.7-6.4%    Diabetes 6.5% or higher     Note: Adopted from ADA consensus guidelines.   Lipid panel reflex to direct LDL Fasting   Result Value Ref Range    Cholesterol 182 <200 mg/dL    Triglycerides 129 <150 mg/dL    Direct Measure HDL 52 >=50 mg/dL    LDL Cholesterol Calculated 104 (H) <=100 mg/dL    Non HDL Cholesterol 130 (H) <130 mg/dL    Narrative    Cholesterol  Desirable:  <200 mg/dL    Triglycerides  Normal:  Less than 150 mg/dL  Borderline High:  150-199 mg/dL  High:  200-499 mg/dL  Very High:  Greater than or equal to 500 mg/dL    Direct Measure HDL  Female:  Greater than or equal to 50 mg/dL   Male:  Greater than or equal to 40 mg/dL    LDL Cholesterol  Desirable:  <100mg/dL  Above Desirable:  100-129 mg/dL   Borderline High:  130-159 mg/dL   High:  160-189 mg/dL   Very High:  >= 190 mg/dL    Non HDL Cholesterol  Desirable:  130 mg/dL  Above Desirable:  130-159 mg/dL  Borderline High:  160-189 mg/dL  High:  190-219 mg/dL  Very High:  Greater than or equal to 220 mg/dL   Comprehensive metabolic panel (BMP + Alb, Alk Phos, ALT, AST, Total. Bili, TP)   Result Value Ref Range    Sodium 139 136 - 145 mmol/L    Potassium 4.2 3.4 - 5.3 mmol/L    Chloride 102 98 - 107 mmol/L    Carbon Dioxide (CO2) 24 22 - 29 mmol/L    Anion Gap 13 7 - 15 mmol/L    Urea Nitrogen 12.5 8.0 - 23.0 mg/dL     Creatinine 0.65 0.51 - 0.95 mg/dL    Calcium 9.6 8.8 - 10.2 mg/dL    Glucose 107 (H) 70 - 99 mg/dL    Alkaline Phosphatase 83 35 - 104 U/L    AST 25 10 - 35 U/L    ALT 13 10 - 35 U/L    Protein Total 7.0 6.4 - 8.3 g/dL    Albumin 4.5 3.5 - 5.2 g/dL    Bilirubin Total 0.6 <=1.2 mg/dL    GFR Estimate >90 >60 mL/min/1.73m2      Comment:      Effective December 21, 2021 eGFRcr in adults is calculated using the 2021 CKD-EPI creatinine equation which includes age and gender (Robby et al., [place], DOI: 10.1056/NZHWtb8503758)   CBC with platelets and differential   Result Value Ref Range    WBC Count 7.0 4.0 - 11.0 10e3/uL    RBC Count 5.19 3.80 - 5.20 10e6/uL    Hemoglobin 15.7 11.7 - 15.7 g/dL    Hematocrit 47.0 35.0 - 47.0 %    MCV 91 78 - 100 fL    MCH 30.3 26.5 - 33.0 pg    MCHC 33.4 31.5 - 36.5 g/dL    RDW 13.2 10.0 - 15.0 %    Platelet Count 211 150 - 450 10e3/uL    % Neutrophils 64 %    % Lymphocytes 22 %    % Monocytes 10 %    % Eosinophils 3 %    % Basophils 1 %    Absolute Neutrophils 4.5 1.6 - 8.3 10e3/uL    Absolute Lymphocytes 1.5 0.8 - 5.3 10e3/uL    Absolute Monocytes 0.7 0.0 - 1.3 10e3/uL    Absolute Eosinophils 0.2 0.0 - 0.7 10e3/uL    Absolute Basophils 0.1 0.0 - 0.2 10e3/uL   T4 free   Result Value Ref Range    Free T4 1.46 0.90 - 1.70 ng/dL       If you have any questions or concerns, please call the clinic at the number listed above.       Sincerely,      Dustin Munoz PA-C

## 2022-11-27 PROCEDURE — 82274 ASSAY TEST FOR BLOOD FECAL: CPT | Performed by: PHYSICIAN ASSISTANT

## 2022-11-29 LAB — HEMOCCULT STL QL IA: NEGATIVE

## 2022-12-28 ENCOUNTER — NURSE TRIAGE (OUTPATIENT)
Dept: NURSING | Facility: CLINIC | Age: 72
End: 2022-12-28

## 2022-12-28 NOTE — TELEPHONE ENCOUNTER
Pt requesting colon cancer screening results. Results given to pt Colon cancer screening is negative.  Yin Alvares RN on 12/28/2022 at 3:08 PM      Reason for Disposition    Information only question and nurse able to answer    Additional Information    Negative: Nursing judgment    Negative: Nursing judgment    Negative: Nursing judgment    Negative: Nursing judgment    Protocols used: INFORMATION ONLY CALL - NO TRIAGE-A-[place]

## 2023-01-03 DIAGNOSIS — E03.9 HYPOTHYROIDISM, UNSPECIFIED TYPE: ICD-10-CM

## 2023-01-04 RX ORDER — LEVOTHYROXINE SODIUM 25 UG/1
25 TABLET ORAL DAILY
Qty: 90 TABLET | Refills: 3 | Status: SHIPPED | OUTPATIENT
Start: 2023-01-04 | End: 2023-12-14

## 2023-02-10 DIAGNOSIS — E78.00 HYPERCHOLESTEROLEMIA: ICD-10-CM

## 2023-02-12 RX ORDER — PRAVASTATIN SODIUM 10 MG
10 TABLET ORAL DAILY
Qty: 90 TABLET | Refills: 1 | Status: SHIPPED | OUTPATIENT
Start: 2023-02-12 | End: 2023-08-01

## 2023-03-14 ENCOUNTER — TELEPHONE (OUTPATIENT)
Dept: PODIATRY | Facility: CLINIC | Age: 73
End: 2023-03-14
Payer: MEDICARE

## 2023-03-14 DIAGNOSIS — B35.1 ONYCHOMYCOSIS OF TOENAIL: Primary | ICD-10-CM

## 2023-03-14 NOTE — TELEPHONE ENCOUNTER
Medication Request for: ciclopirox olamine external cream 0.77%            Prescription last written on 11/19/21 by Dr. Castro  Sig: Apply topically daily, apply to affect nails daily at night   Last Fill Quantity: 30g with # refills: 6     Last Office Visit by provider: 11/19/21  Future Office Visit:   None scheduled  Patient desires to have faxed or E-prescribe to pharmacy if available  Pharmacy selected in [facility].    Phone number patient can be reached at: Home number on file [phone] (home)

## 2023-03-15 RX ORDER — CICLOPIROX OLAMINE 7.7 MG/G
CREAM TOPICAL DAILY
Qty: 30 G | Refills: 6 | Status: SHIPPED | OUTPATIENT
Start: 2023-03-15 | End: 2023-12-14

## 2023-07-30 DIAGNOSIS — E78.00 HYPERCHOLESTEROLEMIA: ICD-10-CM

## 2023-08-01 RX ORDER — PRAVASTATIN SODIUM 10 MG
10 TABLET ORAL DAILY
Qty: 90 TABLET | Refills: 0 | Status: SHIPPED | OUTPATIENT
Start: 2023-08-01 | End: 2023-10-25

## 2023-10-03 NOTE — TELEPHONE ENCOUNTER
Refill provided. T4 normal (slightly elevated TSH). Up to date on annual.   
Routing refill request to provider for review/approval because:  Labs out of range:  TSH  Labs not current: TSH    TSH   Date Value Ref Range Status   11/21/2022 4.88 (H) 0.30 - 4.20 uIU/mL Final   11/11/2021 3.68 0.40 - 4.00 mU/L Final   11/10/2020 3.39 0.40 - 4.00 mU/L Final       Yoko DAN RN  [facility]    
No

## 2023-10-24 DIAGNOSIS — E78.00 HYPERCHOLESTEROLEMIA: ICD-10-CM

## 2023-10-25 RX ORDER — PRAVASTATIN SODIUM 10 MG
10 TABLET ORAL DAILY
Qty: 90 TABLET | Refills: 0 | Status: SHIPPED | OUTPATIENT
Start: 2023-10-25 | End: 2023-12-14

## 2023-12-14 ENCOUNTER — OFFICE VISIT (OUTPATIENT)
Dept: FAMILY MEDICINE | Facility: CLINIC | Age: 73
End: 2023-12-14
Payer: MEDICARE

## 2023-12-14 VITALS
HEART RATE: 80 BPM | RESPIRATION RATE: 14 BRPM | HEIGHT: 64 IN | DIASTOLIC BLOOD PRESSURE: 78 MMHG | SYSTOLIC BLOOD PRESSURE: 145 MMHG | TEMPERATURE: 97.5 F | OXYGEN SATURATION: 98 % | BODY MASS INDEX: 17.67 KG/M2 | WEIGHT: 103.5 LBS

## 2023-12-14 DIAGNOSIS — E03.9 HYPOTHYROIDISM, UNSPECIFIED TYPE: ICD-10-CM

## 2023-12-14 DIAGNOSIS — Z12.31 VISIT FOR SCREENING MAMMOGRAM: ICD-10-CM

## 2023-12-14 DIAGNOSIS — F17.200 TOBACCO DEPENDENCE: ICD-10-CM

## 2023-12-14 DIAGNOSIS — E78.5 HYPERLIPIDEMIA LDL GOAL <100: ICD-10-CM

## 2023-12-14 DIAGNOSIS — Z12.11 SCREEN FOR COLON CANCER: ICD-10-CM

## 2023-12-14 DIAGNOSIS — R91.1 LUNG NODULE, SOLITARY: ICD-10-CM

## 2023-12-14 DIAGNOSIS — E55.9 VITAMIN D INSUFFICIENCY: ICD-10-CM

## 2023-12-14 DIAGNOSIS — E78.00 HYPERCHOLESTEROLEMIA: ICD-10-CM

## 2023-12-14 DIAGNOSIS — E46 PROTEIN-CALORIE MALNUTRITION, UNSPECIFIED SEVERITY (H): ICD-10-CM

## 2023-12-14 DIAGNOSIS — R21 RASH: ICD-10-CM

## 2023-12-14 DIAGNOSIS — Z87.891 PERSONAL HISTORY OF NICOTINE DEPENDENCE: ICD-10-CM

## 2023-12-14 DIAGNOSIS — F33.1 MODERATE EPISODE OF RECURRENT MAJOR DEPRESSIVE DISORDER (H): ICD-10-CM

## 2023-12-14 DIAGNOSIS — Z87.891 PERSONAL HISTORY OF TOBACCO USE: ICD-10-CM

## 2023-12-14 DIAGNOSIS — Z12.2 SCREENING FOR LUNG CANCER: ICD-10-CM

## 2023-12-14 DIAGNOSIS — M25.50 MULTIPLE JOINT PAIN: ICD-10-CM

## 2023-12-14 DIAGNOSIS — Z11.59 NEED FOR HEPATITIS C SCREENING TEST: ICD-10-CM

## 2023-12-14 DIAGNOSIS — Z00.00 ENCOUNTER FOR MEDICARE ANNUAL WELLNESS EXAM: Primary | ICD-10-CM

## 2023-12-14 DIAGNOSIS — J43.9 PULMONARY EMPHYSEMA, UNSPECIFIED EMPHYSEMA TYPE (H): ICD-10-CM

## 2023-12-14 LAB
ALBUMIN SERPL BCG-MCNC: 4.4 G/DL (ref 3.5–5.2)
ALP SERPL-CCNC: 85 U/L (ref 40–150)
ALT SERPL W P-5'-P-CCNC: 19 U/L (ref 0–50)
ANION GAP SERPL CALCULATED.3IONS-SCNC: 11 MMOL/L (ref 7–15)
AST SERPL W P-5'-P-CCNC: 22 U/L (ref 0–45)
BILIRUB SERPL-MCNC: 0.7 MG/DL
BUN SERPL-MCNC: 13.4 MG/DL (ref 8–23)
CALCIUM SERPL-MCNC: 9.5 MG/DL (ref 8.8–10.2)
CHLORIDE SERPL-SCNC: 101 MMOL/L (ref 98–107)
CHOLEST SERPL-MCNC: 191 MG/DL
CREAT SERPL-MCNC: 0.67 MG/DL (ref 0.51–0.95)
CRP SERPL-MCNC: <3 MG/L
DEPRECATED HCO3 PLAS-SCNC: 27 MMOL/L (ref 22–29)
EGFRCR SERPLBLD CKD-EPI 2021: >90 ML/MIN/1.73M2
ERYTHROCYTE [SEDIMENTATION RATE] IN BLOOD BY WESTERGREN METHOD: 7 MM/HR (ref 0–30)
FASTING STATUS PATIENT QL REPORTED: YES
GLUCOSE SERPL-MCNC: 104 MG/DL (ref 70–99)
HBA1C MFR BLD: 5.6 % (ref 0–5.6)
HCV AB SERPL QL IA: NONREACTIVE
HDLC SERPL-MCNC: 54 MG/DL
LDLC SERPL CALC-MCNC: 110 MG/DL
NONHDLC SERPL-MCNC: 137 MG/DL
POTASSIUM SERPL-SCNC: 3.9 MMOL/L (ref 3.4–5.3)
PROT SERPL-MCNC: 7 G/DL (ref 6.4–8.3)
SODIUM SERPL-SCNC: 139 MMOL/L (ref 135–145)
T4 FREE SERPL-MCNC: 1.48 NG/DL (ref 0.9–1.7)
TRIGL SERPL-MCNC: 133 MG/DL
TSH SERPL DL<=0.005 MIU/L-ACNC: 5.37 UIU/ML (ref 0.3–4.2)
VIT D+METAB SERPL-MCNC: 41 NG/ML (ref 20–50)

## 2023-12-14 PROCEDURE — 83036 HEMOGLOBIN GLYCOSYLATED A1C: CPT | Mod: GZ

## 2023-12-14 PROCEDURE — G0439 PPPS, SUBSEQ VISIT: HCPCS

## 2023-12-14 PROCEDURE — 86803 HEPATITIS C AB TEST: CPT

## 2023-12-14 PROCEDURE — G0296 VISIT TO DETERM LDCT ELIG: HCPCS

## 2023-12-14 PROCEDURE — 36415 COLL VENOUS BLD VENIPUNCTURE: CPT

## 2023-12-14 PROCEDURE — 86140 C-REACTIVE PROTEIN: CPT

## 2023-12-14 PROCEDURE — 99214 OFFICE O/P EST MOD 30 MIN: CPT | Mod: 25

## 2023-12-14 PROCEDURE — 80061 LIPID PANEL: CPT

## 2023-12-14 PROCEDURE — 80053 COMPREHEN METABOLIC PANEL: CPT

## 2023-12-14 PROCEDURE — 82306 VITAMIN D 25 HYDROXY: CPT

## 2023-12-14 PROCEDURE — 84443 ASSAY THYROID STIM HORMONE: CPT

## 2023-12-14 PROCEDURE — 85652 RBC SED RATE AUTOMATED: CPT

## 2023-12-14 PROCEDURE — 84439 ASSAY OF FREE THYROXINE: CPT

## 2023-12-14 RX ORDER — PRAVASTATIN SODIUM 10 MG
10 TABLET ORAL DAILY
Qty: 90 TABLET | Refills: 3 | Status: SHIPPED | OUTPATIENT
Start: 2023-12-14 | End: 2024-06-27

## 2023-12-14 RX ORDER — CLOTRIMAZOLE 1 %
CREAM (GRAM) TOPICAL 2 TIMES DAILY
Qty: 15 G | Refills: 0 | Status: SHIPPED | OUTPATIENT
Start: 2023-12-14 | End: 2024-06-27

## 2023-12-14 RX ORDER — LEVOTHYROXINE SODIUM 25 UG/1
25 TABLET ORAL DAILY
Qty: 90 TABLET | Refills: 3 | Status: SHIPPED | OUTPATIENT
Start: 2023-12-14 | End: 2024-06-27

## 2023-12-14 SDOH — HEALTH STABILITY: PHYSICAL HEALTH: ON AVERAGE, HOW MANY DAYS PER WEEK DO YOU ENGAGE IN MODERATE TO STRENUOUS EXERCISE (LIKE A BRISK WALK)?: 1 DAY

## 2023-12-14 ASSESSMENT — ANXIETY QUESTIONNAIRES
GAD7 TOTAL SCORE: 8
2. NOT BEING ABLE TO STOP OR CONTROL WORRYING: SEVERAL DAYS
IF YOU CHECKED OFF ANY PROBLEMS ON THIS QUESTIONNAIRE, HOW DIFFICULT HAVE THESE PROBLEMS MADE IT FOR YOU TO DO YOUR WORK, TAKE CARE OF THINGS AT HOME, OR GET ALONG WITH OTHER PEOPLE: SOMEWHAT DIFFICULT
3. WORRYING TOO MUCH ABOUT DIFFERENT THINGS: SEVERAL DAYS
7. FEELING AFRAID AS IF SOMETHING AWFUL MIGHT HAPPEN: NOT AT ALL
6. BECOMING EASILY ANNOYED OR IRRITABLE: SEVERAL DAYS
4. TROUBLE RELAXING: MORE THAN HALF THE DAYS
1. FEELING NERVOUS, ANXIOUS, OR ON EDGE: SEVERAL DAYS
GAD7 TOTAL SCORE: 8
5. BEING SO RESTLESS THAT IT IS HARD TO SIT STILL: MORE THAN HALF THE DAYS

## 2023-12-14 ASSESSMENT — SOCIAL DETERMINANTS OF HEALTH (SDOH)
IN A TYPICAL WEEK, HOW MANY TIMES DO YOU TALK ON THE PHONE WITH FAMILY, FRIENDS, OR NEIGHBORS?: TWICE A WEEK
HOW OFTEN DO YOU ATTENT MEETINGS OF THE CLUB OR ORGANIZATION YOU BELONG TO?: NEVER
DO YOU BELONG TO ANY CLUBS OR ORGANIZATIONS SUCH AS CHURCH GROUPS UNIONS, FRATERNAL OR ATHLETIC GROUPS, OR SCHOOL GROUPS?: NO

## 2023-12-14 ASSESSMENT — ENCOUNTER SYMPTOMS
DIARRHEA: 0
HEMATURIA: 0
COUGH: 1
ARTHRALGIAS: 1
FEVER: 0
SHORTNESS OF BREATH: 1
DYSURIA: 0
HEARTBURN: 1
EYE PAIN: 0
MYALGIAS: 1
NERVOUS/ANXIOUS: 1
NAUSEA: 0
HEADACHES: 0
HEMATOCHEZIA: 0
ABDOMINAL PAIN: 0
CONSTIPATION: 1
DIZZINESS: 0
PARESTHESIAS: 1
SORE THROAT: 0
WEAKNESS: 0
JOINT SWELLING: 0
PALPITATIONS: 1
BREAST MASS: 0
CHILLS: 0

## 2023-12-14 ASSESSMENT — LIFESTYLE VARIABLES: HOW OFTEN DO YOU HAVE A DRINK CONTAINING ALCOHOL: NEVER

## 2023-12-14 ASSESSMENT — PATIENT HEALTH QUESTIONNAIRE - PHQ9
10. IF YOU CHECKED OFF ANY PROBLEMS, HOW DIFFICULT HAVE THESE PROBLEMS MADE IT FOR YOU TO DO YOUR WORK, TAKE CARE OF THINGS AT HOME, OR GET ALONG WITH OTHER PEOPLE: SOMEWHAT DIFFICULT
SUM OF ALL RESPONSES TO PHQ QUESTIONS 1-9: 9
SUM OF ALL RESPONSES TO PHQ QUESTIONS 1-9: 9

## 2023-12-14 ASSESSMENT — ACTIVITIES OF DAILY LIVING (ADL): CURRENT_FUNCTION: NO ASSISTANCE NEEDED

## 2023-12-14 NOTE — PATIENT INSTRUCTIONS
Tetanus vaccination-needs tetanus component only (no diptheria or pertussis)    Patient Education   Personalized Prevention Plan  You are due for the preventive services outlined below.  Your care team is available to assist you in scheduling these services.  If you have already completed any of these items, please share that information with your care team to update in your medical record.  Health Maintenance Due   Topic Date Due     Osteoporosis Screening  Never done     COPD Action Plan  Never done     Pneumococcal Vaccine (1 - PCV) Never done     Hepatitis C Screening  Never done     Zoster (Shingles) Vaccine (1 of 2) Never done     RSV VACCINE (Pregnancy & 60+) (1 - 1-dose 60+ series) Never done     Diptheria Tetanus Pertussis (DTAP/TDAP/TD) Vaccine (1 - Tdap) 10/14/2020     LUNG CANCER SCREENING  11/14/2020     Mammogram  11/27/2022     Flu Vaccine (1) Never done     COVID-19 Vaccine (3 - 2023-24 season) 09/01/2023     Colorectal Cancer Screening  11/27/2023     Thyroid Function Lab  11/21/2023     Learning About Depression Screening  What is depression screening?  Depression screening is a way to see if you have depression symptoms. It may be done by a doctor or counselor. It's often part of a routine checkup. That's because your mental health is just as important as your physical health.  Depression is a mental health condition that affects how you feel, think, and act. You may:  Have less energy.  Lose interest in your daily activities.  Feel sad and grouchy for a long time.  Depression is very common. It affects people of all ages.  Many things can lead to depression. Some people become depressed after they have a stroke or find out they have a major illness like cancer or heart disease. The death of a loved one or a breakup may lead to depression. It can run in families. Most experts believe that a combination of inherited genes and stressful life events can cause it.  What happens during screening?  You  "may be asked to fill out a form about your depression symptoms. You and the doctor will discuss your answers. The doctor may ask you more questions to learn more about how you think, act, and feel.  What happens after screening?  If you have symptoms of depression, your doctor will talk to you about your options.  Doctors usually treat depression with medicines or counseling. Often, combining the two works best. Many people don't get help because they think that they'll get over the depression on their own. But people with depression may not get better unless they get treatment.  The cause of depression is not well understood. There may be many factors involved. But if you have depression, it's not your fault.  A serious symptom of depression is thinking about death or suicide. If you or someone you care about talks about this or about feeling hopeless, get help right away.  It's important to know that depression can be treated. Medicine, counseling, and self-care may help.  Where can you learn more?  Go to [email or web address].net/patiented  Enter T185 in the search box to learn more about \"Learning About Depression Screening.\"  Current as of: June 25, 2023               Content Version: 13.8    6501-2707 [employer].   Care instructions adapted under license by your healthcare professional. If you have questions about a medical condition or this instruction, always ask your healthcare professional. [employer] disclaims any warranty or liability for your use of this information.         Lung Cancer Screening   Frequently Asked Questions  If you are at high-risk for lung cancer, getting screened with low-dose computed tomography (LDCT) every year can help save your life. This handout offers answers to some of the most common questions about lung cancer screening. If you have other questions, please call [phone] ([phone]).     What is it?  Lung cancer screening uses " special X-ray technology to create an image of your lung tissue. The exam is quick and easy and takes less than 10 seconds. We don t give you any medicine or use any needles. You can eat before and after the exam. You don t need to change your clothes as long as the clothing on your chest doesn t contain metal. But, you do need to be able to hold your breath for at least 6 seconds during the exam.    What is the goal of lung cancer screening?  The goal of lung cancer screening is to save lives. Many times, lung cancer is not found until a person starts having physical symptoms. Lung cancer screening can help detect lung cancer in the earliest stages when it may be easier to treat.    Who should be screened for lung cancer?  We suggest lung cancer screening for anyone who is at high-risk for lung cancer. You are in the high-risk group if you:      are between the ages of 55 and 79, and    have smoked at least 1 pack of cigarettes a day for 20 or more years, and    still smoke or have quit within the past 15 years.    However, if you have a new cough or shortness of breath, you should talk to your doctor before being screened.    Why does it matter if I have symptoms?  Certain symptoms can be a sign that you have a condition in your lungs that should be checked and treated by your doctor. These symptoms include fever, chest pain, a new or changing cough, shortness of breath that you have never felt before, coughing up blood or unexplained weight loss. Having any of these symptoms can greatly affect the results of lung cancer screening.       Should all smokers get an LDCT lung cancer screening exam?  It depends. Lung cancer screening is for a very specific group of men and women who have a history of heavy smoking over a long period of time (see  Who should be screened for lung cancer  above).  I am in the high-risk group, but have been diagnosed with cancer in the past. Is LDCT lung cancer screening right for me?  In  some cases, you should not have LDCT lung screening, such as when your doctor is already following your cancer with CT scan studies. Your doctor will help you decide if LDCT lung screening is right for you.  Do I need to have a screening exam every year?  Yes. If you are in the high-risk group described earlier, you should get an LDCT lung cancer screening exam every year until you are 79, or are no longer willing or able to undergo screening and possible procedures to diagnose and treat lung cancer.  How effective is LDCT at preventing death from lung cancer?  Studies have shown that LDCT lung cancer screening can lower the risk of death from lung cancer by 20 percent in people who are at high-risk.  What are the risks?  There are some risks and limitations of LDCT lung cancer screening. We want to make sure you understand the risks and benefits, so please let us know if you have any questions. Your doctor may want to talk with you more about these risks.    Radiation exposure: As with any exam that uses radiation, there is a very small increased risk of cancer. The amount of radiation in LDCT is small--about the same amount a person would get from a mammogram. Your doctor orders the exam when he or she feels the potential benefits outweigh the risks.    False negatives: No test is perfect, including LDCT. It is possible that you may have a medical condition, including lung cancer, that is not found during your exam. This is called a false negative result.    False positives and more testing: LDCT very often finds something in the lung that could be cancer, but in fact is not. This is called a false positive result. False positive tests often cause anxiety. To make sure these findings are not cancer, you may need to have more tests. These tests will be done only if you give us permission. Sometimes patients need a treatment that can have side effects, such as a biopsy. For more information on false positives, see   What can I expect from the results?     Findings not related to lung cancer: Your LDCT exam also takes pictures of areas of your body next to your lungs. In a very small number of cases, the CT scan will show an abnormal finding in one of these areas, such as your kidneys, adrenal glands, liver or thyroid. This finding may not be serious, but you may need more tests. Your doctor can help you decide what other tests you may need, if any.  What can I expect from the results?  About 1 out of 4 LDCT exams will find something that may need more tests. Most of the time, these findings are lung nodules. Lung nodules are very small collections of tissue in the lung. These nodules are very common, and the vast majority--more than 97 percent--are not cancer (benign). Most are normal lymph nodes or small areas of scarring from past infections.  But, if a small lung nodule is found to be cancer, the cancer can be cured more than 90 percent of the time. To know if the nodule is cancer, we may need to get more images before your next yearly screening exam. If the nodule has suspicious features (for example, it is large, has an odd shape or grows over time), we will refer you to a specialist for further testing.  Will my doctor also get the results?  Yes. Your doctor will get a copy of your results.  Is it okay to keep smoking now that there s a cancer screening exam?  No. Tobacco is one of the strongest cancer-causing agents. It causes not only lung cancer, but other cancers and cardiovascular (heart) diseases as well. The damage caused by smoking builds over time. This means that the longer you smoke, the higher your risk of disease. While it is never too late to quit, the sooner you quit, the better.  Where can I find help to quit smoking?  The best way to prevent lung cancer is to stop smoking. If you have already quit smoking, congratulations and keep it up! For help on quitting smoking, please call [facility] at  5-225-QUIT-NOW (1-146.263.5668) or the [employer] at 1-713.949.4987 to find local resources near you.  One-on-one health coaching:  If you d prefer to work individually with a health care provider on tobacco cessation, we offer:      Medication Therapy Management:  Our specially trained pharmacists work closely with you and your doctor to help you quit smoking.  Call [phone] or [phone] (toll free).    Patient Education   Personalized Prevention Plan  You are due for the preventive services outlined below.  Your care team is available to assist you in scheduling these services.  If you have already completed any of these items, please share that information with your care team to update in your medical record.  Health Maintenance Due   Topic Date Due     Osteoporosis Screening  Never done     COPD Action Plan  Never done     Pneumococcal Vaccine (1 - PCV) Never done     Hepatitis C Screening  Never done     Zoster (Shingles) Vaccine (1 of 2) Never done     RSV VACCINE (Pregnancy & 60+) (1 - 1-dose 60+ series) Never done     Diptheria Tetanus Pertussis (DTAP/TDAP/TD) Vaccine (1 - Tdap) 10/14/2020     LUNG CANCER SCREENING  11/14/2020     Mammogram  11/27/2022     Flu Vaccine (1) Never done     COVID-19 Vaccine (3 - 2023-24 season) 09/01/2023     Colorectal Cancer Screening  11/27/2023     Thyroid Function Lab  11/21/2023     Your Health Risk Assessment indicates you feel you are not in good health    A healthy lifestyle helps keep the body fit and the mind alert. It helps protect you from disease, helps you fight disease, and helps prevent chronic disease (disease that doesn't go away) from getting worse. This is important as you get older and begin to notice twinges in muscles and joints and a decline in the strength and stamina you once took for granted. A healthy lifestyle includes good healthcare, good nutrition, weight control, recreation, and regular exercise. Avoid harmful substances and  do what you can to keep safe. Another part of a healthy lifestyle is stay mentally active and socially involved.    Good healthcare   Have a wellness visit every year.   If you have new symptoms, let us know right away. Don't wait until the next checkup.   Take medicines exactly as prescribed and keep your medicines in a safe place. Tell us if your medicine causes problems.   Healthy diet and weight control   Eat 3 or 4 small, nutritious, low-fat, high-fiber meals a day. Include a variety of fruits, vegetables, and whole-grain foods.   Make sure you get enough calcium in your diet. Calcium, vitamin D, and exercise help prevent osteoporosis (bone thinning).   If you live alone, try eating with others when you can. That way you get a good meal and have company while you eat it.   Try to keep a healthy weight. If you eat more calories than your body uses for energy, it will be stored as fat and you will gain weight.     Recreation   Recreation is not limited to sports and team events. It includes any activity that provides relaxation, interest, enjoyment, and exercise. Recreation provides an outlet for physical, mental, and social energy. It can give a sense of worth and achievement. It can help you stay healthy.    Mental Exercise and Social Involvement  Mental and emotional health is as important as physical health. Keep in touch with friends and family. Stay as active as possible. Continue to learn and challenge yourself.   Things you can do to stay mentally active are:  Learn something new, like a foreign language or musical instrument.   Play SCRABBLE or do crossword puzzles. If you cannot find people to play these games with you at home, you can play them with others on your computer through the Internet.   Join a games club--anything from card games to chess or checkers or lawn bowling.   Start a new hobby.   Go back to school.   Volunteer.   Read.   Keep up with world events.  Learning About Being Physically  "Active  What is physical activity?     Being physically active means doing any kind of activity that gets your body moving.  The types of physical activity that can help you get fit and stay healthy include:  Aerobic or \"cardio\" activities. These make your heart beat faster and make you breathe harder, such as brisk walking, riding a bike, or running. They strengthen your heart and lungs and build up your endurance.  Strength training activities. These make your muscles work against, or \"resist,\" something. Examples include lifting weights or doing push-ups. These activities help tone and strengthen your muscles and bones.  Stretches. These let you move your joints and muscles through their full range of motion. Stretching helps you be more flexible.  Reaching a balance between these three types of physical activity is important because each one contributes to your overall fitness.  What are the benefits of being active?  Being active is one of the best things you can do for your health. It helps you to:  Feel stronger and have more energy to do all the things you like to do.  Focus better at school or work.  Feel, think, and sleep better.  Reach and stay at a healthy weight.  Lose fat and build lean muscle.  Lower your risk for serious health problems, including diabetes, heart attack, high blood pressure, and some cancers.  Keep your heart, lungs, bones, muscles, and joints strong and healthy.  How can you make being active part of your life?  Start slowly. Make it your long-term goal to get at least 30 minutes of exercise on most days of the week. Walking is a good choice. You also may want to do other activities, such as running, swimming, cycling, or playing tennis or team sports.  Pick activities that you like--ones that make your heart beat faster, your muscles stronger, and your muscles and joints more flexible. If you find more than one thing you like doing, do them all. You don't have to do the same thing " "every day.  Get your heart pumping every day. Any activity that makes your heart beat faster and keeps it at that rate for a while counts.  Here are some great ways to get your heart beating faster:  Go for a brisk walk, run, or hike.  Go for a swim or bike ride.  Take an online exercise class or dance.  Play a game of touch football, basketball, or soccer.  Play tennis, pickleball, or racquetball.  Climb stairs.  Even some household chores can be aerobic. Just do them at a faster pace. Raking or mowing the lawn, sweeping the garage, and vacuuming and cleaning your home all can help get your heart rate up.  Strengthen your muscles during the week. You don't have to lift heavy weights or grow big, bulky muscles to get stronger. Doing a few simple activities that make your muscles work against, or \"resist,\" something can help you get stronger. Aim for at least twice a week.  For example, you can:  Do push-ups or sit-ups, which use your own body weight as resistance.  Lift weights or dumbbells or use stretch bands at home or in a gym or community center.  Stretch your muscles often. Stretching will help you as you become more active. It can help you stay flexible and loosen tight muscles. It can also help improve your balance and posture and can be a great way to relax.  Be sure to stretch the muscles you'll be using when you work out. It's best to warm your muscles slightly before you stretch them. Walk or do some other light aerobic activity for a few minutes. Then start stretching.  When you stretch your muscles:  Do it slowly. Stretching is not about going fast or making sudden movements.  Don't push or bounce during a stretch.  Hold each stretch for at least 15 to 30 seconds, if you can. You should feel a stretch in the muscle, but not pain.  Breathe out as you do the stretch. Then breathe in as you hold the stretch. Don't hold your breath.  If you're worried about how more activity might affect your health, have " "a checkup before you start. Follow any special advice your doctor gives you for getting a smart start.  Where can you learn more?  Go to https://www.[employer].net/patiented  Enter W332 in the search box to learn more about \"Learning About Being Physically Active.\"  Current as of: June 6, 2023               Content Version: 13.8    2060-5446 [employer].   Care instructions adapted under license by your healthcare professional. If you have questions about a medical condition or this instruction, always ask your healthcare professional. [employer] disclaims any warranty or liability for your use of this information.      Learning About Dietary Guidelines  What are the Dietary Guidelines for Americans?     Dietary Guidelines for Americans provide tips for eating well and staying healthy. This helps reduce the risk for long-term (chronic) diseases.  These guidelines recommend that you:  Eat and drink the right amount for you. The [place] government's food guide is called MyPlate. It can help you make your own well-balanced eating plan.  Try to balance your eating with your activity. This helps you stay at a healthy weight.  Drink alcohol in moderation, if at all.  Limit foods high in salt, saturated fat, trans fat, and added sugar.  These guidelines are from the U.[place]. Department of Agriculture and the [place] Department of Health and Human Services. They are updated every 5 years.  What is MyPlate?  MyPlate is the [place] government's food guide. It can help you make your own well-balanced eating plan. A balanced eating plan means that you eat enough, but not too much, and that your food gives you the nutrients you need to stay healthy.  MyPlate focuses on eating plenty of whole grains, fruits, and vegetables, and on limiting fat and sugar. It is available online at www.ChooseMyPlate.gov.  How can you get started?  If you're trying to eat healthier, you can slowly change your eating habits over time. " "You don't have to make big changes all at once. Start by adding one or two healthy foods to your meals each day.  Grains  Choose whole-grain breads and cereals and whole-wheat pasta and whole-grain crackers.  Vegetables  Eat a variety of vegetables every day. They have lots of nutrients and are part of a heart-healthy diet.  Fruits  Eat a variety of fruits every day. Fruits contain lots of nutrients. Choose fresh fruit instead of fruit juice.  Protein foods  Choose fish and lean poultry more often. Eat red meat and fried meats less often. Dried beans, tofu, and nuts are also good sources of protein.  Dairy  Choose low-fat or fat-free products from this food group. If you have problems digesting milk, try eating cheese or yogurt instead.  Fats and oils  Limit fats and oils if you're trying to cut calories. Choose healthy fats when you cook. These include canola oil and olive oil.  Where can you learn more?  Go to https://www.[employer].net/patiented  Enter D676 in the search box to learn more about \"Learning About Dietary Guidelines.\"  Current as of: February 28, 2023               Content Version: 13.8    3622-2490 [email or web address].   Care instructions adapted under license by your healthcare professional. If you have questions about a medical condition or this instruction, always ask your healthcare professional. [email or web address] disclaims any warranty or liability for your use of this information.      Hearing Loss: Care Instructions  Overview     Hearing loss is a sudden or slow decrease in how well you hear. It can range from slight to profound. Permanent hearing loss can occur with aging. It also can happen when you are exposed long-term to loud noise. Examples include listening to loud music, riding motorcycles, or being around other loud machines.  Hearing loss can affect your work and home life. It can make you feel lonely or depressed. You may feel that you have lost your independence. But " hearing aids and other devices can help you hear better and feel connected to others.  Follow-up care is a key part of your treatment and safety. Be sure to make and go to all appointments, and call your doctor if you are having problems. It's also a good idea to know your test results and keep a list of the medicines you take.  How can you care for yourself at home?  Avoid loud noises whenever possible. This helps keep your hearing from getting worse.  Always wear hearing protection around loud noises.  Wear a hearing aid as directed.  A professional can help you pick a hearing aid that will work best for you.  You can also get hearing aids over the counter for mild to moderate hearing loss.  Have hearing tests as your doctor suggests. They can show whether your hearing has changed. Your hearing aid may need to be adjusted.  Use other devices as needed. These may include:  Telephone amplifiers and hearing aids that can connect to a television, stereo, radio, or microphone.  Devices that use lights or vibrations. These alert you to the doorbell, a ringing telephone, or a baby monitor.  Television closed-captioning. This shows the words at the bottom of the screen. Most new TVs can do this.  TTY (text telephone). This lets you type messages back and forth on the telephone instead of talking or listening. These devices are also called TDD. When messages are typed on the keyboard, they are sent over the phone line to a receiving TTY. The message is shown on a monitor.  Use text messaging, social media, and email if it is hard for you to communicate by telephone.  Try to learn a listening technique called speechreading. It is not lipreading. You pay attention to people's gestures, expressions, posture, and tone of voice. These clues can help you understand what a person is saying. Face the person you are talking to, and have them face you. Make sure the lighting is good. You need to see the other person's face  "clearly.  Think about counseling if you need help to adjust to your hearing loss.  When should you call for help?  Watch closely for changes in your health, and be sure to contact your doctor if:    You think your hearing is getting worse.     You have new symptoms, such as dizziness or nausea.   Where can you learn more?  Go to https://www.[employer].net/patiented  Enter R798 in the search box to learn more about \"Hearing Loss: Care Instructions.\"  Current as of: February 28, 2023               Content Version: 13.8    7082-5570 [employer].   Care instructions adapted under license by your healthcare professional. If you have questions about a medical condition or this instruction, always ask your healthcare professional. [employer] disclaims any warranty or liability for your use of this information.      Bladder Training: Care Instructions  Your Care Instructions     Bladder training is used to treat urge incontinence and stress incontinence. Urge incontinence means that the need to urinate comes on so fast that you can't get to a toilet in time. Stress incontinence means that you leak urine because of pressure on your bladder. For example, it may happen when you laugh, cough, or lift something heavy.  Bladder training can increase how long you can wait before you have to urinate. It can also help your bladder hold more urine. And it can give you better control over the urge to urinate.  It is important to remember that bladder training takes a few weeks to a few months to make a difference. You may not see results right away, but don't give up.  Follow-up care is a key part of your treatment and safety. Be sure to make and go to all appointments, and call your doctor if you are having problems. It's also a good idea to know your test results and keep a list of the medicines you take.  How can you care for yourself at home?  Work with your doctor to come up with a bladder training " "program that is right for you. You may use one or more of the following methods.  Delayed urination  In the beginning, try to keep from urinating for 5 minutes after you first feel the need to go.  While you wait, take deep, slow breaths to relax. Kegel exercises can also help you delay the need to go to the bathroom.  After some practice, when you can easily wait 5 minutes to urinate, try to wait 10 minutes before you urinate.  Slowly increase the waiting period until you are able to control when you have to urinate.  Scheduled urination  Empty your bladder when you first wake up in the morning.  Schedule times throughout the day when you will urinate.  Start by going to the bathroom every hour, even if you don't need to go.  Slowly increase the time between trips to the bathroom.  When you have found a schedule that works well for you, keep doing it.  If you wake up during the night and have to urinate, do it. Apply your schedule to waking hours only.  Kegel exercises  These tighten and strengthen pelvic muscles, which can help you control the flow of urine. (If doing these exercises causes pain, stop doing them and talk with your doctor.) To do Kegel exercises:  Squeeze your muscles as if you were trying not to pass gas. Or squeeze your muscles as if you were stopping the flow of urine. Your belly, legs, and buttocks shouldn't move.  Hold the squeeze for 3 seconds, then relax for 5 to 10 seconds.  Start with 3 seconds, then add 1 second each week until you are able to squeeze for 10 seconds.  Repeat the exercise 10 times a session. Do 3 to 8 sessions a day.  When should you call for help?  Watch closely for changes in your health, and be sure to contact your doctor if:    Your incontinence is getting worse.     You do not get better as expected.   Where can you learn more?  Go to https://www.healthwise.net/patiented  Enter V684 in the search box to learn more about \"Bladder Training: Care Instructions.\"  Current " as of: February 28, 2023               Content Version: 13.8    6280-5515 [employer].   Care instructions adapted under license by your healthcare professional. If you have questions about a medical condition or this instruction, always ask your healthcare professional. [employer] disclaims any warranty or liability for your use of this information.      Your Health Risk Assessment indicates you feel you are not in good emotional health.    Recreation   Recreation is not limited to sports and team events. It includes any activity that provides relaxation, interest, enjoyment, and exercise. Recreation provides an outlet for physical, mental, and social energy. It can give a sense of worth and achievement. It can help you stay healthy.    Mental Exercise and Social Involvement  Mental and emotional health is as important as physical health. Keep in touch with friends and family. Stay as active as possible. Continue to learn and challenge yourself.   Things you can do to stay mentally active are:  Learn something new, like a foreign language or musical instrument.   Play SCRABBLE or do crossword puzzles. If you cannot find people to play these games with you at home, you can play them with others on your computer through the Internet.   Join a games club--anything from card games to chess or checkers or lawn bowling.   Start a new hobby.   Go back to school.   Volunteer.   Read.   Keep up with world events.  Learning About Depression Screening  What is depression screening?  Depression screening is a way to see if you have depression symptoms. It may be done by a doctor or counselor. It's often part of a routine checkup. That's because your mental health is just as important as your physical health.  Depression is a mental health condition that affects how you feel, think, and act. You may:  Have less energy.  Lose interest in your daily activities.  Feel sad and grouchy for a long  "time.  Depression is very common. It affects people of all ages.  Many things can lead to depression. Some people become depressed after they have a stroke or find out they have a major illness like cancer or heart disease. The death of a loved one or a breakup may lead to depression. It can run in families. Most experts believe that a combination of inherited genes and stressful life events can cause it.  What happens during screening?  You may be asked to fill out a form about your depression symptoms. You and the doctor will discuss your answers. The doctor may ask you more questions to learn more about how you think, act, and feel.  What happens after screening?  If you have symptoms of depression, your doctor will talk to you about your options.  Doctors usually treat depression with medicines or counseling. Often, combining the two works best. Many people don't get help because they think that they'll get over the depression on their own. But people with depression may not get better unless they get treatment.  The cause of depression is not well understood. There may be many factors involved. But if you have depression, it's not your fault.  A serious symptom of depression is thinking about death or suicide. If you or someone you care about talks about this or about feeling hopeless, get help right away.  It's important to know that depression can be treated. Medicine, counseling, and self-care may help.  Where can you learn more?  Go to https://www.[employer].net/patiented  Enter T185 in the search box to learn more about \"Learning About Depression Screening.\"  Current as of: June 25, 2023               Content Version: 13.8    1740-8779 [employer].   Care instructions adapted under license by your healthcare professional. If you have questions about a medical condition or this instruction, always ask your healthcare professional. [employer] disclaims any warranty or liability for " your use of this information.

## 2023-12-14 NOTE — PROGRESS NOTES
The patient's PHQ-9 score is consistent with mild depression. She was provided with information regarding depression and was advised to schedule a follow up appointment in *** weeks to further address this issue.  SUBJECTIVE:   Jeannine is a 73 year old, presenting for the following:  Wellness Visit (C/o current joint pain-arthritis related which occurs mainly in the morning and also throughout the day ( knees, hip and feet-hx planter fascitis) ) and Derm Problem (Possible fungi infection on arms, shape of a ring, red and itchy x 5 mo)        12/14/2023     8:54 AM   Additional Questions   Roomed by Dixie   Accompanied by Self       Are you in the first 12 months of your Medicare coverage?  No    HPI    Today's PHQ-9 Score:       12/14/2023     8:44 AM   PHQ-9 SCORE   PHQ-9 Total Score MyChart 9 (Mild depression)   PHQ-9 Total Score 9           Have you ever done Advance Care Planning? (For example, a Health Directive, POLST, or a discussion with a medical provider or your loved ones about your wishes): { :541669}    {Hearing Test Done (Optional):555804}   Fall risk  Fallen 2 or more times in the past year?: No  Any fall with injury in the past year?: No    Cognitive Screening   1) Repeat 3 items (Leader, Season, Table)    2) Clock draw: NORMAL  3) 3 item recall: Recalls 3 objects  Results: NORMAL clock, 1-2 items recalled: COGNITIVE IMPAIRMENT LESS LIKELY    Mini-CogTM Copyright DILSHAD Avery. Licensed by the author for use in [facility]; reprinted with permission (amber@.[facility]). All rights reserved.      {Do you have sleep apnea, excessive snoring or daytime drowsiness? (Optional):965408}    Reviewed and updated as needed this visit by clinical staff   Tobacco  Allergies  Meds              Reviewed and updated as needed this visit by Provider                 Social History     Tobacco Use    Smoking status: Every Day     Packs/day: .25     Types: Cigarettes    Smokeless tobacco: Never    Tobacco comments:      12 cigarettes a day   Substance Use Topics    Alcohol use: No     {Rooming staff  Click this link to complete the Prescreen if response below is not for today's visit  Alcohol Use Prescreen >3 drinks/day or > 7 drinks/week.  If the prescreen question answer is YES, complete the full AUDIT  :515162}        11/21/2022    10:26 AM   Alcohol Use   Prescreen: >3 drinks/day or >7 drinks/week? Not Applicable   {add AUDIT responses (Optional) (A score of 7 for adult men is an indication of hazardous drinking; a score of 8 or more is an indication of an alcohol use disorder.  A score of 7 or more for adult women is an indication of hazardous drinking or an alchohol use disorder):649790}  Do you have a current opioid prescription? { :509947}  Do you use any other controlled substances or medications that are not prescribed by a provider? {Substance Use :175902}  {Provider  If there are gaps in the social history shown above, please follow the link and refresh the note Link to Social and Substance History :238366}    {Outside tests to abstract? (Optional):975675}    {additional problems to add (Optional):714011}    Current providers sharing in care for this patient include: {Rooming staff:  Please update Care Team from storyboard, refresh this note and then delete this statement}  Patient Care Team:  Jessica Melgar PA-C as PCP - General (Family Medicine)  Haase, Susan Rachele, APRN CNP as Referring Physician (Family Medicine)  Cyndi Adam PA-C as Physician Assistant (Dermatology)  Adam Wells PA-C as Assigned Musculoskeletal Provider  Haase, Susan Rachele, APRN CNP as Assigned PCP    The following health maintenance items are reviewed in Epic and correct as of today:  Health Maintenance   Topic Date Due    DEXA  Never done    COPD ACTION PLAN  Never done    Pneumococcal Vaccine: 65+ Years (1 - PCV) Never done    HEPATITIS C SCREENING  Never done    ZOSTER IMMUNIZATION (1 of 2) Never done    RSV  "VACCINE (Pregnancy & 60+) (1 - 1-dose 60+ series) Never done    DTAP/TDAP/TD IMMUNIZATION (1 - Tdap) 10/14/2020    LUNG CANCER SCREENING  2020    MAMMO SCREENING  2022    INFLUENZA VACCINE (1) Never done    COVID-19 Vaccine (3 - - season) 2023    ANNUAL REVIEW OF HM ORDERS  2023    FALL RISK ASSESSMENT  2023    COLORECTAL CANCER SCREENING  2023    MEDICARE ANNUAL WELLNESS VISIT  2023    TSH W/FREE T4 REFLEX  2023    PHQ-9  2024    LIPID  2027    ADVANCE CARE PLANNING  2027    SPIROMETRY  Completed    DEPRESSION ACTION PLAN  Completed    IPV IMMUNIZATION  Aged Out    HPV IMMUNIZATION  Aged Out    MENINGITIS IMMUNIZATION  Aged Out    RSV MONOCLONAL ANTIBODY  Aged Out     {Chronicprobdata (optional):771945}  {Decision Support (Optional):225310}        Review of Systems  {ROS COMP (Optional):111638}    OBJECTIVE:   BP (!) 145/78 (BP Location: Right arm, Patient Position: Sitting, Cuff Size: Adult Small)   Pulse 80   Temp 97.5  F (36.4  C) (Oral)   Resp 14   Ht 1.626 m (5' 4\")   Wt 46.9 kg (103 lb 8 oz)   SpO2 98%   BMI 17.77 kg/m   Estimated body mass index is 17.77 kg/m  as calculated from the following:    Height as of this encounter: 1.626 m (5' 4\").    Weight as of this encounter: 46.9 kg (103 lb 8 oz).  Physical Exam  {Exam (Optional) :236578}    {Diagnostic Test Results (Optional):668049}    ASSESSMENT / PLAN:   {Diag Picklist:728895}    {Patient advised of split billing (Optional):132676}      COUNSELING:  {Medicare Counselin}        She reports that she has been smoking cigarettes. She has been smoking an average of .25 packs per day. She has never used smokeless tobacco.  Nicotine/Tobacco Cessation Plan:   {Nicotine/Tobacco Cessation Plan:161011}      Appropriate preventive services were discussed with this patient, including applicable screening as appropriate for fall prevention, nutrition, physical activity, Tobacco-use " "cessation, weight loss and cognition.  Checklist reviewing preventive services available has been given to the patient.    Reviewed patients plan of care and provided an AVS. The {CarePlan:570109} for Jeannine meets the Care Plan requirement. This Care Plan has been established and reviewed with the {PATIENT, FAMILY MEMBER, CAREGIVER:845589}.    {Counseling Resources  [place] Preventive Services Task Force  Cholesterol Screening  Health diet/nutrition  Pooled Cohorts Equation Calculator  [employer]'s MyPlate  ASA Prophylaxis  Lung CA Screening  Osteoporosis prevention/bone health :230743}  {Breast Cancer Risk Calculator  BRCA-Related Cancer Risk Assessment FHS-7 Tool :138408}    DALILA Guzman [facility]    Identified Health Risks:  {Medicare required documentation of substance and opioid use disorders screening :472174}  Answers submitted by the patient for this visit:  Patient Health Questionnaire (Submitted on 12/14/2023)  If you checked off any problems, how difficult have these problems made it for you to do your work, take care of things at home, or get along with other people?: Somewhat difficult  PHQ9 TOTAL SCORE: 9  VERÓNICA-7 (Submitted on 12/14/2023)  VERÓNICA 7 TOTAL SCORE: 8  Annual Preventive Visit (Submitted on 12/14/2023)  Chief Complaint: Annual Exam:  In general, how would you rate your overall physical health?: fair  Frequency of exercise:: None  Do you usually eat at least 4 servings of fruit and vegetables a day, include whole grains & fiber, and avoid regularly eating high fat or \"junk\" foods? : No  Taking medications regularly:: Yes  Medication side effects:: Muscle aches  Activities of Daily Living: no assistance needed  Home safety: no safety concerns identified  Hearing Impairment:: difficulty following a conversation in a noisy restaurant or crowded room, feel that people are mumbling or not speaking clearly, need to ask people to speak up or repeat themselves, difficulty " understanding soft or whispered speech  In the past 6 months, have you been bothered by leaking of urine?: Yes  abdominal pain: No  Blood in stool: No  Blood in urine: No  chest pain: No  chills: No  congestion: Yes  constipation: Yes  cough: Yes  diarrhea: No  dizziness: No  ear pain: Yes  eye pain: No  nervous/anxious: Yes  fever: No  genital sores: No  headaches: No  hearing loss: Yes  heartburn: Yes  arthralgias: Yes  joint swelling: No  peripheral edema: No  mood changes: No  myalgias: Yes  nausea: No  dysuria: No  palpitations: Yes  Skin sensation changes: Yes  sore throat: No  urgency: Yes  rash: Yes  shortness of breath: Yes  visual disturbance: No  weakness: No  pelvic pain: No  vaginal bleeding: No  vaginal discharge: No  tenderness: No  breast mass: No  breast discharge: No  In general, how would you rate your overall mental or emotional health?: fair

## 2023-12-14 NOTE — COMMUNITY RESOURCES LIST (ENGLISH)
12/14/2023   [facility]  N/A  For questions about this resource list or additional care needs, please contact your primary care clinic or care manager.  Phone: [phone]   Email: N/A   Address: [address] [place] [address]   Hours: N/A        Hotlines and Helplines       Hotline - Housing crisis  1  [facility] (Main Office) Distance: 7.05 miles      Phone/Virtual   [address] [place] [address]  Language: English  Hours: Mon - Sun Open 24 Hours   Phone: (591) 807-3096 Email: info@[employer] Website: http://[employer]     2  [facility] Distance: 13.69 miles      Phone/Virtual   [address] [place] [address]  Language: English  Hours: Mon - Sun Open 24 Hours   Phone: (356) 258-9323 Email: info@[employer] Website: http://www.[employer].org          Housing       Coordinated Entry access point  3  [facility] [employer] [place] ([facility] - Housing Services Distance: 13.81 miles      In-Person   [address] [place] [address]  Language: English  Hours: Mon - Fri 9:00 AM - 5:00 PM  Fees: Free   Phone: (863) 517-9303 Email: housing@[facility].org Website: http://www.[facility].org/housing     4  [facility] Distance: 16.14 miles      In-Person, Phone/Virtual   [address] [place] [address]  Language: English  Hours: Mon - Fri 8:30 AM - 4:30 PM  Fees: Free   Phone: (490) 555-8515 Email: info@[facility].org Website: https://www.[facility].org/locations/[facility]-clinic/     Drop-in center or day shelter  5  [facility] Distance: 14.21 miles      In-Person   [address] [place] [address]  Language: English  Hours: Mon - Fri 12:00 PM - 3:00 PM  Fees: Free   Phone: (193) 632-5119 Email: [employer] Website: http://[employer].org/     6  [facility] [place] [facility] Distance: 14.37 miles       In-Person   [address] [place] [address]  Language: English, Samoan, Dominican  Hours: Mon - Sat 7:00 AM - 3:00 PM  Fees: Free, Self Pay   Phone: (333) 470-3990 Email: info@[employer] Website: https://www.[employer]/locations/opportunity-center/     Housing search assistance  7  [facility] - Rental Homes for Future Homebuyers Program Distance: 4.51 miles      Phone/Virtual   [address] [place] [address]  Language: English  Hours: Mon - Fri 8:00 AM - 4:30 PM  Fees: Free   Phone: (783) 196-3233 Email: hra@[facility] Website: https://www.[facility]/hra/[facility]-and-offnttshhoyda-aiouefywf-vhc     8  [facility] - Online Housing Search Assistance Distance: 12.91 miles      Phone/Virtual   [address] [place] [address]  Language: English  Hours: Mon - Sun Open 24 Hours  Fees: Free   Phone: (271) 956-7352 Email: findhoumesfin@[employer] Website: https://[employer]/     Shelter for families  9  [facility] Distance: 9.41 miles      In-Person   [address] [place] [address]  Language: English  Hours: Mon - Sun Open 24 Hours  Fees: Free, Sliding Fee   Phone: (579) 683-3004 Ext.1 Email: info@[facility].[employer] Website: http://www.[facility].org     Shelter for individuals  10  [employer] [facility] [place] Distance: 9.17 miles      In-Person   [address] [place] [address]  Language: English, Dominican  Hours: Mon - Fri 8:00 AM - 4:30 PM  Fees: Free   Phone: (637) 534-7708 Email: info@[facility].org Website: http://www.[facility].org     11  [employer] [facility] [place] [facility] [place] Distance: 11.03 miles      In-Person   [address] [place] [address]  Language: English, Dominican  Hours: Mon - Fri 8:00 AM - 4:30 PM  Fees: Free   Phone: (506)  057-8005 Email: info@[employer].org Website: https://www.capagency.org/          Important Numbers & Websites       Emergency Services   911  [facility] Services   311  Poison Control   (637) 490-3560  Suicide Prevention Lifeline   (457) 982-8653 (TALK)  Child Abuse Hotline   (480) 652-2660 (4-A-Child)  Sexual Assault Hotline   (267) 799-8833 (HOPE)  National Runaway Safeline   (461) 322-1003 (RUNAWAY)  All-Options Talkline   (454) 418-7086  Substance Abuse Referral   (333) 767-5633 (HELP)

## 2023-12-14 NOTE — LETTER
January 2, 2024      Jeannine Carpio  [address] [facility] [address]        Dear ,    We are writing to inform you of your test results.    The Fecal Occult Blood testing was NEGATIVE for any occult blood in your stool, which means there is a very low chance of colon cancer at this time. It is recommended to repeat this test yearly.      Resulted Orders   Hepatitis C Screen Reflex to HCV RNA Quant and Genotype   Result Value Ref Range    Hepatitis C Antibody Nonreactive Nonreactive    Narrative    Assay performance characteristics have not been established for newborns, infants, and children.   Fecal colorectal cancer screen FIT - Future (S+30)   Result Value Ref Range    Occult Blood Screen FIT Negative Negative   TSH   Result Value Ref Range    TSH 5.37 (H) 0.30 - 4.20 uIU/mL   T4, free   Result Value Ref Range    Free T4 1.48 0.90 - 1.70 ng/dL   Hemoglobin A1c   Result Value Ref Range    Hemoglobin A1C 5.6 0.0 - 5.6 %      Comment:      Normal <5.7%   Prediabetes 5.7-6.4%    Diabetes 6.5% or higher     Note: Adopted from ADA consensus guidelines.   Vitamin D Deficiency   Result Value Ref Range    Vitamin D, Total (25-Hydroxy) 41 20 - 50 ng/mL      Comment:      optimum levels    Narrative    Season, race, dietary intake, and treatment affect the concentration of 25-hydroxy-Vitamin D. Values may decrease during winter months and increase during summer months.    Vitamin D determination is routinely performed by an immunoassay specific for 25 hydroxyvitamin D3.  If an individual is on vitamin D2(ergocalciferol) supplementation, please specify 25 OH vitamin D2 and D3 level determination by LCMSMS test VITD23.     Lipid panel reflex to direct LDL Fasting   Result Value Ref Range    Cholesterol 191 <200 mg/dL    Triglycerides 133 <150 mg/dL    Direct Measure HDL 54 >=50 mg/dL    LDL Cholesterol Calculated 110 (H) <=100 mg/dL    Non HDL Cholesterol 137 (H) <130 mg/dL    Patient Fasting > 8hrs? Yes      Narrative    Cholesterol  Desirable:  <200 mg/dL    Triglycerides  Normal:  Less than 150 mg/dL  Borderline High:  150-199 mg/dL  High:  200-499 mg/dL  Very High:  Greater than or equal to 500 mg/dL    Direct Measure HDL  Female:  Greater than or equal to 50 mg/dL   Male:  Greater than or equal to 40 mg/dL    LDL Cholesterol  Desirable:  <100mg/dL  Above Desirable:  100-129 mg/dL   Borderline High:  130-159 mg/dL   High:  160-189 mg/dL   Very High:  >= 190 mg/dL    Non HDL Cholesterol  Desirable:  130 mg/dL  Above Desirable:  130-159 mg/dL  Borderline High:  160-189 mg/dL  High:  190-219 mg/dL  Very High:  Greater than or equal to 220 mg/dL   Comprehensive metabolic panel (BMP + Alb, Alk Phos, ALT, AST, Total. Bili, TP)   Result Value Ref Range    Sodium 139 135 - 145 mmol/L      Comment:      Reference intervals for this test were updated on 09/26/2023 to more accurately reflect our healthy population. There may be differences in the flagging of prior results with similar values performed with this method. Interpretation of those prior results can be made in the context of the updated reference intervals.     Potassium 3.9 3.4 - 5.3 mmol/L    Carbon Dioxide (CO2) 27 22 - 29 mmol/L    Anion Gap 11 7 - 15 mmol/L    Urea Nitrogen 13.4 8.0 - 23.0 mg/dL    Creatinine 0.67 0.51 - 0.95 mg/dL    GFR Estimate >90 >60 mL/min/1.73m2    Calcium 9.5 8.8 - 10.2 mg/dL    Chloride 101 98 - 107 mmol/L    Glucose 104 (H) 70 - 99 mg/dL    Alkaline Phosphatase 85 40 - 150 U/L      Comment:      Reference intervals for this test were updated on 11/14/2023 to more accurately reflect our healthy population. There may be differences in the flagging of prior results with similar values performed with this method. Interpretation of those prior results can be made in the context of the updated reference intervals.    AST 22 0 - 45 U/L      Comment:      Reference intervals for this test were updated on 6/12/2023 to more accurately reflect  our healthy population. There may be differences in the flagging of prior results with similar values performed with this method. Interpretation of those prior results can be made in the context of the updated reference intervals.    ALT 19 0 - 50 U/L      Comment:      Reference intervals for this test were updated on 6/12/2023 to more accurately reflect our healthy population. There may be differences in the flagging of prior results with similar values performed with this method. Interpretation of those prior results can be made in the context of the updated reference intervals.      Protein Total 7.0 6.4 - 8.3 g/dL    Albumin 4.4 3.5 - 5.2 g/dL    Bilirubin Total 0.7 <=1.2 mg/dL   ESR: Erythrocyte sedimentation rate   Result Value Ref Range    Erythrocyte Sedimentation Rate 7 0 - 30 mm/hr   CRP, inflammation   Result Value Ref Range    CRP Inflammation <3.00 <5.00 mg/L       If you have any questions or concerns, please call the clinic at the number listed above.       Sincerely,      Jessica Melgar PA-C

## 2023-12-14 NOTE — PROGRESS NOTES
"SUBJECTIVE:   Jeannine is a 73 year old, presenting for the following:  Wellness Visit (C/o current joint pain-arthritis related which occurs mainly in the morning and also throughout the day ( knees, hip and feet-hx planter fascitis) ) and Derm Problem (Possible fungi infection on arms, shape of a ring, red and itchy x 5 mo)        12/14/2023     8:54 AM   Additional Questions   Roomed by Dixie   Accompanied by Self     Are you in the first 12 months of your Medicare coverage?  No    Healthy Habits:     In general, how would you rate your overall health?  Fair    Frequency of exercise:  None    Do you usually eat at least 4 servings of fruit and vegetables a day, include whole grains    & fiber and avoid regularly eating high fat or \"junk\" foods?  No    Taking medications regularly:  Yes    Medication side effects:  Muscle aches    Ability to successfully perform activities of daily living:  No assistance needed    Home Safety:  No safety concerns identified    Hearing Impairment:  Difficulty following a conversation in a noisy restaurant or crowded room, feel that people are mumbling or not speaking clearly, need to ask people to speak up or repeat themselves and difficulty understanding soft or whispered speech    In the past 6 months, have you been bothered by leaking of urine? Yes    In general, how would you rate your overall mental or emotional health?  Fair    -Ongoing pain related to arthritis. Using OTC medications with some relief.   -Feels mood is stable currently. Has been on medication for anxiety and depression in the past but does not feel she needs that at this point.   -Has a small circular rash on her left forearm which has been present for a few months. Flacky and itchy. Not spreading. No other rashes or symptoms.      Today's PHQ-9 Score:       12/14/2023     8:44 AM   PHQ-9 SCORE   PHQ-9 Total Score MyChart 9 (Mild depression)   PHQ-9 Total Score 9     Have you ever done Advance Care Planning? (For " example, a Health Directive, POLST, or a discussion with a medical provider or your loved ones about your wishes): No, advance care planning information given to patient to review.  Patient plans to discuss their wishes with loved ones or provider.      Fall risk  Fallen 2 or more times in the past year?: No  Any fall with injury in the past year?: No    Cognitive Screening   1) Repeat 3 items (Leader, Season, Table)    2) Clock draw: NORMAL  3) 3 item recall: Recalls 3 objects  Results: 3 items recalled: COGNITIVE IMPAIRMENT LESS LIKELY    Mini-CogTM Copyright S Gena. Licensed by the author for use in [facility]; reprinted with permission (amber@[facility]). All rights reserved.      Do you have sleep apnea, excessive snoring or daytime drowsiness? : no    Reviewed and updated as needed this visit by clinical staff   Tobacco  Allergies  Meds  Problems  Med Hx  Surg Hx  Fam Hx          Reviewed and updated as needed this visit by Provider   Tobacco  Allergies  Meds  Problems  Med Hx  Surg Hx  Fam Hx           Social History     Tobacco Use    Smoking status: Every Day     Packs/day: .25     Types: Cigarettes    Smokeless tobacco: Never    Tobacco comments:     12 cigarettes a day   Substance Use Topics    Alcohol use: No         12/14/2023     9:02 AM   Alcohol Use   Prescreen: >3 drinks/day or >7 drinks/week? Not Applicable     Do you have a current opioid prescription? No  Do you use any other controlled substances or medications that are not prescribed by a provider? None    Current providers sharing in care for this patient include:   Patient Care Team:  Jessica Melgar PA-C as PCP - General (Family Medicine)  Haase, Susan Rachele, APRN CNP as Referring Physician (Family Medicine)  Cyndi Adam PA-C as Physician Assistant (Dermatology)  Adam Wells PA-C as Assigned Musculoskeletal Provider  Haase, Susan Rachele, APRN CNP as Assigned PCP    The following [facility]  maintenance items are reviewed in Epic and correct as of today:  Health Maintenance   Topic Date Due    DEXA  Never done    COPD ACTION PLAN  Never done    Pneumococcal Vaccine: 65+ Years (1 - PCV) Never done    HEPATITIS C SCREENING  Never done    ZOSTER IMMUNIZATION (1 of 2) Never done    RSV VACCINE (Pregnancy & 60+) (1 - 1-dose 60+ series) Never done    DTAP/TDAP/TD IMMUNIZATION (1 - Tdap) 10/14/2020    LUNG CANCER SCREENING  11/14/2020    MAMMO SCREENING  11/27/2022    INFLUENZA VACCINE (1) Never done    COVID-19 Vaccine (3 - 2023-24 season) 09/01/2023    ANNUAL REVIEW OF HM ORDERS  11/21/2023    COLORECTAL CANCER SCREENING  11/27/2023    MEDICARE ANNUAL WELLNESS VISIT  11/21/2023    TSH W/FREE T4 REFLEX  11/21/2023    PHQ-9  06/14/2024    FALL RISK ASSESSMENT  12/14/2024    LIPID  11/21/2027    ADVANCE CARE PLANNING  11/21/2027    SPIROMETRY  Completed    DEPRESSION ACTION PLAN  Completed    IPV IMMUNIZATION  Aged Out    HPV IMMUNIZATION  Aged Out    MENINGITIS IMMUNIZATION  Aged Out    RSV MONOCLONAL ANTIBODY  Aged Out     BP Readings from Last 3 Encounters:   12/14/23 (!) 145/78   11/21/22 126/64   09/13/22 122/60    Wt Readings from Last 3 Encounters:   12/14/23 46.9 kg (103 lb 8 oz)   11/21/22 45.3 kg (99 lb 14.4 oz)   09/13/22 45.2 kg (99 lb 9.6 oz)          Patient Active Problem List   Diagnosis    Tobacco dependence    Arthralgia    Lumbar radiculopathy    CARDIOVASCULAR SCREENING; LDL GOAL LESS THAN 130    Vaginal atrophy    Menopause    DDD (degenerative disc disease), lumbar    Advanced directives, counseling/discussion    Social anxiety disorder    Mild major depression (H)    Fatigue    Vitamin D insufficiency    Elevated glucose    Protein-calorie malnutrition, unspecified severity (H24)    Hypothyroidism, unspecified type    Lung nodule, solitary    Pulmonary emphysema, unspecified emphysema type (H)     Past Surgical History:   Procedure Laterality Date    LEEP TX, CERVICAL      LEEP TX  Cervical       Social History     Tobacco Use    Smoking status: Every Day     Packs/day: .75     Types: Cigarettes     Start date: 1967    Smokeless tobacco: Never    Tobacco comments:     12 cigarettes a day   Substance Use Topics    Alcohol use: No     Family History   Problem Relation Age of Onset    Diabetes Mother     Cerebrovascular Disease Mother     Arthritis Mother     Eye Disorder Mother         Cataracts    Heart Disease Mother         CHF, A-Fib    Osteoporosis Mother     Thyroid Disease Mother         Hypothyroidism    Alzheimer Disease Father     Eye Disorder Father         Cataracts    Heart Disease Father         Bypass    Thyroid Disease Maternal Grandmother     Thyroid Disease Brother         Hyperthyroidism    Alcoholism Brother     Depression Sister     Thyroid Disease Sister     Thyroid Disease Daughter         Hypothyroid         Current Outpatient Medications   Medication Sig Dispense Refill    Cholecalciferol (VITAMIN D) 2000 UNIT tablet Take 1,000 Units by mouth daily       clotrimazole (LOTRIMIN) 1 % external cream Apply topically 2 times daily 15 g 0    levothyroxine (SYNTHROID/LEVOTHROID) 25 MCG tablet Take 1 tablet (25 mcg) by mouth daily 90 tablet 3    OMEPRAZOLE PO       pravastatin (PRAVACHOL) 10 MG tablet Take 1 tablet (10 mg) by mouth daily 90 tablet 3    albuterol (PROAIR HFA/PROVENTIL HFA/VENTOLIN HFA) 108 (90 Base) MCG/ACT inhaler Inhale 2 puffs into the lungs every 6 hours 8.5 g 4     Allergies   Allergen Reactions    Ibuprofen Hives    Penicillins Hives    Tetracyclines & Related Hives     Review of Systems   Constitutional:  Negative for chills and fever.   HENT:  Positive for congestion, ear pain and hearing loss. Negative for sore throat.    Eyes:  Negative for pain and visual disturbance.   Respiratory:  Positive for cough and shortness of breath.    Cardiovascular:  Positive for palpitations. Negative for chest pain and peripheral edema.   Gastrointestinal:  Positive for  "constipation and heartburn. Negative for abdominal pain, diarrhea, hematochezia and nausea.   Breasts:  Negative for tenderness, breast mass and discharge.   Genitourinary:  Positive for urgency. Negative for dysuria, genital sores, hematuria, pelvic pain, vaginal bleeding and vaginal discharge.   Musculoskeletal:  Positive for arthralgias and myalgias. Negative for joint swelling.   Skin:  Positive for rash.   Neurological:  Positive for paresthesias. Negative for dizziness, weakness and headaches.   Psychiatric/Behavioral:  Negative for mood changes. The patient is nervous/anxious.      OBJECTIVE:   BP (!) 145/78 (BP Location: Right arm, Patient Position: Sitting, Cuff Size: Adult Small)   Pulse 80   Temp 97.5  F (36.4  C) (Oral)   Resp 14   Ht 1.626 m (5' 4\")   Wt 46.9 kg (103 lb 8 oz)   SpO2 98%   BMI 17.77 kg/m   Estimated body mass index is 17.77 kg/m  as calculated from the following:    Height as of this encounter: 1.626 m (5' 4\").    Weight as of this encounter: 46.9 kg (103 lb 8 oz).  Physical Exam  GENERAL: healthy, alert and no distress  EYES: Eyes grossly normal to inspection, PERRL and conjunctivae and sclerae normal  RESP: lungs clear to auscultation - no rales, rhonchi or wheezes  CV: regular rate and rhythm, normal S1 S2, no S3 or S4, no murmur, click or rub  MS: no gross musculoskeletal defects noted  SKIN: Circular, erythematous, slightly raised rash on the left forearm with flacking of the skin. Surrounding excoriations. No drainage/discharge.   NEURO: Normal strength and tone, mentation intact and speech normal  PSYCH: mentation appears normal, affect normal/bright    Diagnostic Test Results:  Labs reviewed in Epic    ASSESSMENT / PLAN:   (Z00.00) Encounter for Medicare annual wellness exam  (primary encounter diagnosis)  Stable exam. Routine screening labs, patient is fasting today. Would like copy of her labs to be sent to her home in addition to call with results. Follow up in 1 year " for annual wellness; sooner as needed for acute concerns.  Plan: REVIEW OF HEALTH MAINTENANCE PROTOCOL ORDERS,         PRIMARY CARE FOLLOW-UP SCHEDULING, TSH, T4,         free, Hemoglobin A1c, Vitamin D Deficiency,         Lipid panel reflex to direct LDL Fasting,         Comprehensive metabolic panel (BMP + Alb, Alk         Phos, ALT, AST, Total. Bili, TP)    (E03.9) Hypothyroidism, unspecified type  Currently on levothyroxine 25 mcg daily.  Will check TSH and T4 today.  Recommendations based on testing results.  Plan: levothyroxine (SYNTHROID/LEVOTHROID) 25 MCG         tablet, TSH, T4, free    (E78.00) Hypercholesterolemia  Well controlled with use of pravastatin. No new side effects of the medication. Refill provided.  Plan: pravastatin (PRAVACHOL) 10 MG tablet    (E55.9) Vitamin D insufficiency  On vitamin D supplementation due to vitamin D deficiency.  No acute concerns at this time.  Will check vitamin D level today.  Plan: Vitamin D Deficiency    (R21) Rash  Circular rash on left forearm that is red and flaky.  Tinea versus nummular eczema.  Will treat with clotrimazole twice daily for 1 to 2 weeks.  If not improving did discuss with patient that it could be eczema and would then treat with a steroid instead.  If does not improve could consider dermatology referral as well.  Patient does have dry skin throughout and cracking of the skin of the hands.  Did encourage increased skin care/hydration techniques.  Plan: clotrimazole (LOTRIMIN) 1 % external cream    (M25.50) Multiple joint pain  Pain involving joints of hands bilaterally, bilateral knees, bilateral hips.  Likely osteoarthritis given history, but would like inflammatory markers.  Will check ESR and CRP today.  Did discuss with her that these are nonspecific and can be increased with any inflammation within the body.  She would still like these to be checked.  Plan: ESR: Erythrocyte sedimentation rate, CRP,         inflammation    (F17.200) Tobacco  dependence  (R91.1) Lung nodule, solitary  (Z12.2) Screening for lung cancer  (Z87.891) Personal history of nicotine dependence  Known pulmonary nodule.  Last checked in 2019 and at that time was stable.  No new symptoms.  Will get repeat chest CT to reevaluate.  Plan: Prof fee: Shared Decision Making for Lung         Cancer Screening    (Z87.891) Personal history of tobacco use  See above.  Continues to smoke approximately three quarters of a pack per day.    (E46) Protein-calorie malnutrition, unspecified severity (H24)  Using protein drinks due to malnourishment.  Feels she is doing well with this.  Weight is stable.    (J43.9) Pulmonary emphysema, unspecified emphysema type (H)  Well-controlled.  No acute concerns at this time.  Does not need refills of her albuterol inhaler at this time.  Will reach out as needed.    (F33.1) Moderate episode of recurrent major depressive disorder (H)  Discussed mood today and she feels that her depression symptoms are stable. She is having some mood changes and has been on medication for depression in the past but feels she is doing well currently. Declines medication at this time. No suicidal ideation. No other acute concerns at this time.     (Z11.59) Need for hepatitis C screening test  Plan: Hepatitis C Screen Reflex to HCV RNA Quant and         Genotype    (Z12.11) Screen for colon cancer  Plan: Fecal colorectal cancer screen FIT - Future         (S+30)    (Z12.31) Visit for screening mammogram  Plan: MA SCREENING DIGITAL BILAT - Future  (s+30)    Patient has been advised of split billing requirements and indicates understanding: Yes    COUNSELING:  Reviewed preventive health counseling, as reflected in patient instructions      She reports that she has been smoking cigarettes. She started smoking about 56 years ago. She has been smoking an average of .75 packs per day. She has never used smokeless tobacco.  Nicotine/Tobacco Cessation Plan:   Information offered: Patient  not interested at this time    Appropriate preventive services were discussed with this patient, including applicable screening as appropriate for fall prevention, nutrition, physical activity, Tobacco-use cessation, weight loss and cognition.  Checklist reviewing preventive services available has been given to the patient.    Reviewed patients plan of care and provided an AVS. The Basic Care Plan (routine screening as documented in Health Maintenance) for Jeannine meets the Care Plan requirement. This Care Plan has been established and reviewed with the Patient.          Jessica Melgar PA-C  [facility]    Identified Health Risks:  I have reviewed Opioid Use Disorder and Substance Use Disorder risk factors and made any needed referrals. Lung Cancer Screening Shared Decision Making Visit     Jeannine Carpio, a 73 year old female, is eligible for lung cancer screening    History   Smoking Status    Every Day    Packs/day: 0.75    Types: Cigarettes    Start date: 1967   Smokeless Tobacco    Never     I have discussed with patient the risks and benefits of screening for lung cancer with low-dose CT.     The risks include:  radiation exposure: one low dose chest CT has as much ionizing radiation as about 15 chest x-rays, or 6 months of background radiation living in [place]      false positives: most findings/nodules are NOT cancer, but some might still require additional diagnostic evaluation, including biopsy    over-diagnosis: some slow growing cancers that might never have been clinically significant will be detected and treated unnecessarily     The benefit of early detection of lung cancer is contingent upon adherence to annual screening or more frequent follow up if indicated.     Furthermore, to benefit from screening, Jeannine must be willing and able to undergo diagnostic procedures, if indicated. Although no specific guide is available for determining severity of comorbidities, it is  reasonable to withhold screening in patients who have greater mortality risk from other diseases.     We did discuss that the best way to prevent lung cancer is to not smoke.    Some patients may value a numeric estimation of lung cancer risk when evaluating if lung cancer screening is right for them, here is one calculator:    ShouldIScreenThe patient was provided with suggestions to help her develop a healthy physical lifestyle.  She is at risk for lack of exercise and has been provided with information to increase physical activity for the benefit of her well-being.  The patient was counseled and encouraged to consider modifying their diet and eating habits. She was provided with information on recommended healthy diet options.  The patient was provided with written information regarding signs of hearing loss.  Information on urinary incontinence and treatment options given to patient.  The patient was provided with suggestions to help her develop a healthy emotional lifestyle.

## 2023-12-15 ENCOUNTER — TELEPHONE (OUTPATIENT)
Dept: FAMILY MEDICINE | Facility: CLINIC | Age: 73
End: 2023-12-15
Payer: MEDICARE

## 2023-12-15 RX ORDER — LEVOTHYROXINE SODIUM 50 UG/1
50 TABLET ORAL DAILY
Qty: 90 TABLET | Refills: 1 | Status: SHIPPED | OUTPATIENT
Start: 2023-12-15 | End: 2024-06-12

## 2023-12-15 RX ORDER — ROSUVASTATIN CALCIUM 10 MG/1
10 TABLET, COATED ORAL DAILY
Qty: 90 TABLET | Refills: 1 | Status: SHIPPED | OUTPATIENT
Start: 2023-12-15 | End: 2024-06-17

## 2023-12-15 NOTE — PROGRESS NOTES
Placed order for rosuvastatin and also increased levothyroxine to 50 mcg daily. If she already picked up the 25 mcg prescription she can take two of these until they run out. I would like to have her back in clinic in 3 months for an office visit and will plan to recheck TSH and lipid panel at that time so would prefer patient be fasting.     Thanks!  Jessica Melgar PA-C

## 2023-12-15 NOTE — TELEPHONE ENCOUNTER
Duplicate encounter- please see TSH result note with date 12/14/23. RN already reached out to patient with results.    Radha PADILLA RN

## 2023-12-15 NOTE — LETTER
December 15, 2023      Jeannine Carpio  [address] [facility] [address]        Dear ,    We are writing to inform you of your test results.    If you have any questions or concerns, please call the clinic at the number listed above.       Sincerely,      Sylvia Garcia RN

## 2023-12-15 NOTE — TELEPHONE ENCOUNTER
Test Results    Contacts         Type Contact Phone/Fax    12/15/2023 02:37 PM CST Phone (Incoming) Jeannine Carpio (Self) [phone] (M)            Who ordered the test:  Jessica Melgar    Type of test: Lab    Date of test:  12/14/2023    Where was the test performed:  [place]    What are your questions/concerns?:  Patient would like to know results. She would also like a paper copy mailed to her.     Okay to leave a detailed message?: Yes at Home number on file [phone] ([place])

## 2023-12-18 ENCOUNTER — TELEPHONE (OUTPATIENT)
Dept: FAMILY MEDICINE | Facility: CLINIC | Age: 73
End: 2023-12-18
Payer: MEDICARE

## 2023-12-18 NOTE — TELEPHONE ENCOUNTER
Jessica Melgar, DALILA  P Cr Triage 12/15/23  See note about medication adjustment, please call patient.    See nurse documented call, pt was informed and new dose of thyroid sent, duplicate  Vandana Russell RN, BSN  [facility]

## 2023-12-29 LAB — HEMOCCULT STL QL IA: NEGATIVE

## 2023-12-29 PROCEDURE — 82274 ASSAY TEST FOR BLOOD FECAL: CPT

## 2024-01-30 ENCOUNTER — HOSPITAL ENCOUNTER (OUTPATIENT)
Dept: MAMMOGRAPHY | Facility: CLINIC | Age: 74
Discharge: HOME | End: 2024-01-30
Payer: MEDICARE

## 2024-01-30 ENCOUNTER — HOSPITAL ENCOUNTER (OUTPATIENT)
Dept: CT IMAGING | Facility: CLINIC | Age: 74
Discharge: HOME | End: 2024-01-30
Payer: MEDICARE

## 2024-01-30 DIAGNOSIS — Z87.891 PERSONAL HISTORY OF NICOTINE DEPENDENCE: ICD-10-CM

## 2024-01-30 DIAGNOSIS — Z12.31 VISIT FOR SCREENING MAMMOGRAM: ICD-10-CM

## 2024-01-30 DIAGNOSIS — R91.1 LUNG NODULE, SOLITARY: ICD-10-CM

## 2024-01-30 DIAGNOSIS — Z87.891 PERSONAL HISTORY OF TOBACCO USE: ICD-10-CM

## 2024-01-30 DIAGNOSIS — Z12.2 SCREENING FOR LUNG CANCER: ICD-10-CM

## 2024-01-30 PROCEDURE — 71271 CT THORAX LUNG CANCER SCR C-: CPT

## 2024-01-30 PROCEDURE — 77063 BREAST TOMOSYNTHESIS BI: CPT

## 2024-06-12 DIAGNOSIS — E03.9 HYPOTHYROIDISM, UNSPECIFIED TYPE: ICD-10-CM

## 2024-06-12 RX ORDER — LEVOTHYROXINE SODIUM 50 UG/1
50 TABLET ORAL DAILY
Qty: 90 TABLET | Refills: 0 | Status: SHIPPED | OUTPATIENT
Start: 2024-06-12 | End: 2024-06-27

## 2024-06-12 NOTE — TELEPHONE ENCOUNTER
Please call patient. Sending 1 refill but she was supposed to be seen in March. Please schedule next available with Jessica.    Dustin Munoz PA-C on 6/12/2024 at 12:06 PM (covering for Jessica Melgar PA-C)

## 2024-06-12 NOTE — LETTER
June 13, 2024      Jeannine Carpio  [address] [facility] [address]      Dear Jeannine,    We recently received a call from your pharmacy requesting a refill of your medication.    A review of your chart indicates that an appointment is required with your provider.  Please call the clinic to schedule your appointment.    We have authorized one refill of your medication to allow time for you to schedule.   If you have a history of diabetes or high cholesterol, please come in fasting for the appointment. Fasting entails nothing to eat or drink 8 hours prior to your appointment; with the exception on water. You may take your medication the day of the appointment.    Thank you,      Jessica Melgar PA-C

## 2024-06-12 NOTE — TELEPHONE ENCOUNTER
Medication Question or Refill    Contacts         Type Contact Phone/Fax    06/12/2024 10:35 AM CDT Phone (Incoming) Jeannine Carpio (Self) [phone] (M)            What medication are you calling about (include dose and sig)?: levothyroxine 50 mcg    Preferred Pharmacy:       [id number] [place], [place] - [address] [place] [address] [address]  [address] [place] [address]  Phone: [phone] Fax: [phone]      Controlled Substance Agreement on file:   CSA -- Patient Level:    CSA: None found at the patient level.       Who prescribed the medication?: Jessica Melgar    Do you need a refill? Yes    When did you use the medication last?     Patient offered an appointment? No    Do you have any questions or concerns?  No      Okay to leave a detailed message?: Yes at Cell number on file:    Telephone Information:   Mobile [phone]

## 2024-06-17 DIAGNOSIS — E78.5 HYPERLIPIDEMIA LDL GOAL <100: ICD-10-CM

## 2024-06-17 RX ORDER — ROSUVASTATIN CALCIUM 10 MG/1
10 TABLET, COATED ORAL DAILY
Qty: 90 TABLET | Refills: 1 | Status: SHIPPED | OUTPATIENT
Start: 2024-06-17

## 2024-06-17 NOTE — TELEPHONE ENCOUNTER
Patient calling for rosuvastatin refill.  States she called her pharmacy last week.  No refill request other than for levothyroxine.  Advised I would send refill request.  Also sent to [facility] and she has never used [facility] and should be [facility].  Removed incorrect pharmacies and added correct pharmacy.  Patsy Ellsworth RN

## 2024-06-27 ENCOUNTER — VIRTUAL VISIT (OUTPATIENT)
Dept: FAMILY MEDICINE | Facility: CLINIC | Age: 74
End: 2024-06-27
Payer: MEDICARE

## 2024-06-27 DIAGNOSIS — I49.9 IRREGULAR HEARTBEAT: ICD-10-CM

## 2024-06-27 DIAGNOSIS — E03.9 HYPOTHYROIDISM, UNSPECIFIED TYPE: Primary | ICD-10-CM

## 2024-06-27 DIAGNOSIS — E78.00 HYPERCHOLESTEROLEMIA: ICD-10-CM

## 2024-06-27 PROCEDURE — 99442 PR PHYSICIAN TELEPHONE EVALUATION 11-20 MIN: CPT | Mod: 93

## 2024-06-27 RX ORDER — LEVOTHYROXINE SODIUM 50 UG/1
50 TABLET ORAL DAILY
Qty: 90 TABLET | Refills: 1 | Status: SHIPPED | OUTPATIENT
Start: 2024-08-26

## 2024-06-27 NOTE — PROGRESS NOTES
Jeannine is a 74 year old who is being evaluated via a billable telephone visit.    What phone number would you like to be contacted at? [phone]  How would you like to obtain your AVS? Mail a copy  Originating Location (pt. Location): Home  Distant Location (provider location):  On-site    Assessment & Plan     (E03.9) Hypothyroidism, unspecified type  (primary encounter diagnosis)  Currently on levothyroxine 50 mcg daily. Not sure if this is benefiting her at all, hasn't been having many side effects prior to starting or on the medication. Due for TSH recheck, ordered today but will complete during visit tomorrow. Refilled medication.   Plan: TSH with free T4 reflex, levothyroxine         (SYNTHROID/LEVOTHROID) 50 MCG tablet          (E78.00) Hypercholesterolemia  Was switched from pravastatin to rosuvastatin due to increase in LDL on medication. Due for recheck of lipid panel.   Plan: Lipid panel reflex to direct LDL Fasting          (I49.9) Irregular heartbeat  For the past several years has noticed intermittent periods of time where she will feel an irregular heartbeat. No known triggers. Typically will last for several weeks at a time and then resolve. Not constant but notable throughout the day when it occurs. Has not been evaluated for this in the past. No associated chest pain, shortness of breath or other symptoms. No syncope. Discussed recommendation for in person visit to further discuss and possible EKG and holter monitor. We discussed signs/symptoms that would warrant emergent follow up. She is scheduled for in person visit tomorrow in clinic.     Follow up tomorrow for irregular heartbeats in clinic; be seen sooner with any new or worsening symptoms.     Subjective   Jeannine is a 74 year old, presenting for the following health issues:  Thyroid Problem and Lipids        6/27/2024     9:54 AM   Additional Questions   Roomed by Lin Gilliam     HPI     Hyperlipidemia Follow-Up  Are you regularly taking  any medication or supplement to lower your cholesterol?   Yes- Rosuvastatin  Are you having muscle aches or other side effects that you think could be caused by your cholesterol lowering medication?  No      Hypothyroidism Follow-up  Since last visit, patient describes the following symptoms: Weight stable, no hair loss, no skin changes, no constipation, no loose stools, dry skin, constipation, fatigue, and hair loss, depression and anxiety      Review of Systems  Constitutional, HEENT, cardiovascular, pulmonary, gi and gu systems are negative, except as otherwise noted.        Objective         Vitals:  No vitals were obtained today due to virtual visit.    Physical Exam   General: Alert and no distress //Respiratory: No audible wheeze, cough, or shortness of breath // Psychiatric:  Appropriate affect, tone, and pace of words      Phone call duration: 11 minutes    Signed Electronically by: Jessica Melgar PA-C

## 2024-06-28 ENCOUNTER — OFFICE VISIT (OUTPATIENT)
Dept: FAMILY MEDICINE | Facility: CLINIC | Age: 74
End: 2024-06-28
Payer: MEDICARE

## 2024-06-28 VITALS
SYSTOLIC BLOOD PRESSURE: 127 MMHG | OXYGEN SATURATION: 98 % | DIASTOLIC BLOOD PRESSURE: 67 MMHG | TEMPERATURE: 97.7 F | BODY MASS INDEX: 16.73 KG/M2 | RESPIRATION RATE: 19 BRPM | WEIGHT: 98 LBS | HEIGHT: 64 IN | HEART RATE: 75 BPM

## 2024-06-28 DIAGNOSIS — R00.2 PALPITATIONS: Primary | ICD-10-CM

## 2024-06-28 DIAGNOSIS — E03.9 HYPOTHYROIDISM, UNSPECIFIED TYPE: ICD-10-CM

## 2024-06-28 DIAGNOSIS — E78.00 HYPERCHOLESTEROLEMIA: ICD-10-CM

## 2024-06-28 LAB
CHOLEST SERPL-MCNC: 135 MG/DL
FASTING STATUS PATIENT QL REPORTED: NO
HDLC SERPL-MCNC: 55 MG/DL
LDLC SERPL CALC-MCNC: 46 MG/DL
NONHDLC SERPL-MCNC: 80 MG/DL
TRIGL SERPL-MCNC: 170 MG/DL
TSH SERPL DL<=0.005 MIU/L-ACNC: 2.59 UIU/ML (ref 0.3–4.2)

## 2024-06-28 PROCEDURE — 84443 ASSAY THYROID STIM HORMONE: CPT

## 2024-06-28 PROCEDURE — G2211 COMPLEX E/M VISIT ADD ON: HCPCS

## 2024-06-28 PROCEDURE — 80061 LIPID PANEL: CPT

## 2024-06-28 PROCEDURE — 36415 COLL VENOUS BLD VENIPUNCTURE: CPT

## 2024-06-28 PROCEDURE — 93242 EXT ECG>48HR<7D RECORDING: CPT

## 2024-06-28 PROCEDURE — 99213 OFFICE O/P EST LOW 20 MIN: CPT

## 2024-06-28 RX ORDER — RESPIRATORY SYNCYTIAL VIRUS VACCINE 120MCG/0.5
0.5 KIT INTRAMUSCULAR ONCE
Qty: 1 EACH | Refills: 0 | Status: CANCELLED | OUTPATIENT
Start: 2024-06-28 | End: 2024-06-28

## 2024-06-28 ASSESSMENT — PATIENT HEALTH QUESTIONNAIRE - PHQ9
10. IF YOU CHECKED OFF ANY PROBLEMS, HOW DIFFICULT HAVE THESE PROBLEMS MADE IT FOR YOU TO DO YOUR WORK, TAKE CARE OF THINGS AT HOME, OR GET ALONG WITH OTHER PEOPLE: NOT DIFFICULT AT ALL
SUM OF ALL RESPONSES TO PHQ QUESTIONS 1-9: 10
SUM OF ALL RESPONSES TO PHQ QUESTIONS 1-9: 10

## 2024-06-28 ASSESSMENT — ANXIETY QUESTIONNAIRES
3. WORRYING TOO MUCH ABOUT DIFFERENT THINGS: SEVERAL DAYS
5. BEING SO RESTLESS THAT IT IS HARD TO SIT STILL: MORE THAN HALF THE DAYS
IF YOU CHECKED OFF ANY PROBLEMS ON THIS QUESTIONNAIRE, HOW DIFFICULT HAVE THESE PROBLEMS MADE IT FOR YOU TO DO YOUR WORK, TAKE CARE OF THINGS AT HOME, OR GET ALONG WITH OTHER PEOPLE: NOT DIFFICULT AT ALL
8. IF YOU CHECKED OFF ANY PROBLEMS, HOW DIFFICULT HAVE THESE MADE IT FOR YOU TO DO YOUR WORK, TAKE CARE OF THINGS AT HOME, OR GET ALONG WITH OTHER PEOPLE?: NOT DIFFICULT AT ALL
GAD7 TOTAL SCORE: 6
GAD7 TOTAL SCORE: 6
1. FEELING NERVOUS, ANXIOUS, OR ON EDGE: SEVERAL DAYS
7. FEELING AFRAID AS IF SOMETHING AWFUL MIGHT HAPPEN: NOT AT ALL
2. NOT BEING ABLE TO STOP OR CONTROL WORRYING: SEVERAL DAYS
GAD7 TOTAL SCORE: 6
4. TROUBLE RELAXING: SEVERAL DAYS
7. FEELING AFRAID AS IF SOMETHING AWFUL MIGHT HAPPEN: NOT AT ALL
6. BECOMING EASILY ANNOYED OR IRRITABLE: NOT AT ALL

## 2024-06-28 NOTE — PROGRESS NOTES
Jeannine Carpio arrived here on 6/28/2024 2:04 PM for 3-7 Days  Zio monitor placement per ordering provider Milton Stewart for the diagnosis palpations .  Patient s skin was prepped per protocol. Dr. Lori Evans is the supervising MD.  Zio monitor was placed.  Instructions were reviewed with and given to the patient.  Patient verbalized understanding of wear, troubleshooting and monitor return instructions.

## 2024-06-28 NOTE — PROGRESS NOTES
Assessment & Plan     (R00.2) Palpitations (primary encounter diagnosis)  Comment: noted on exam today. Some irregularity w/ auscultation of heart sounds. Otherwise majority of the time was normal. Shared decision to have zio patch placed to evaluate palpitations. Will follow up on results and whether further direction necessary. Discussed cardiac red flags and when to present to the ED if necessary. Patient fully understands and is agreeable with plan of care, at this point patient will follow up as needed unless acute concerns arise in the meantime.  Plan: ZIO PATCH 3-7 DAYS (additional cost to         patient), ZIO PATCH 3-7 DAYS APPLICATION    (E03.9) Hypothyroidism, unspecified type  (E78.00) Hypercholesterolemia  Comment: labs released per PCP. Last TSH elevated. Good idea to have this checked given previous increase in med and current palpitations.     Barbra Paze is a 74 year old, presenting for the following health issues:  Heart Problem        6/28/2024     1:17 PM   Additional Questions   Roomed by Tasha OLEA CMA     History of Present Illness       Reason for visit:  Heart    She eats 2-3 servings of fruits and vegetables daily.She consumes 0 sweetened beverage(s) daily.She exercises with enough effort to increase her heart rate 9 or less minutes per day.  She exercises with enough effort to increase her heart rate 3 or less days per week.   She is taking medications regularly.       Chest Pain  Onset/Duration: Afib feeling started about 3 weeks ago    Accompanying Signs & Symptoms:  Shortness of breath: No  Sweating: No  Nausea/vomiting: No  Lightheadedness: No  Palpitations: YES  Fever/Chills: No  Cough: No           Heartburn: No  History:   Family history of heart disease: YES, parents  Tobacco use: YES  Previous similar symptoms: YES  Precipitating factors:   Worse with exertion: No  Worse with deep breaths: No           Related to eating: No           Better with burping: No  Therapies tried  "and outcome: nothing      Review of Systems  Constitutional, HEENT, cardiovascular, pulmonary, gi and gu systems are negative, except as otherwise noted.      Objective    /67 (BP Location: Right arm, Patient Position: Sitting, Cuff Size: Adult Regular)   Pulse 75   Temp 97.7  F (36.5  C) (Oral)   Resp 19   Ht 1.626 m (5' 4\")   Wt 44.5 kg (98 lb)   SpO2 98%   BMI 16.82 kg/m    Body mass index is 16.82 kg/m .  Physical Exam  Vitals and nursing note reviewed.   Constitutional:       General: She is not in acute distress.     Appearance: Normal appearance. She is not ill-appearing.   Cardiovascular:      Rate and Rhythm: Normal rate and regular rhythm.      Heart sounds: No murmur heard.     No friction rub. No gallop.   Pulmonary:      Effort: Pulmonary effort is normal. No respiratory distress.      Breath sounds: No wheezing, rhonchi or rales.   Skin:     General: Skin is warm and dry.   Neurological:      Mental Status: She is alert.   Psychiatric:         Mood and Affect: Mood normal.         Behavior: Behavior normal. Behavior is cooperative.         Thought Content: Thought content normal.         Judgment: Judgment normal.            Signed Electronically by: EDITA Keating CNP    "

## 2024-06-28 NOTE — LETTER
July 1, 2024      Jeannine Carpio  [address] [facility] [address]        Dear ,    We are writing to inform you of your test results.    Labs look good and are stable.     Resulted Orders   TSH with free T4 reflex   Result Value Ref Range    TSH 2.59 0.30 - 4.20 uIU/mL   Lipid panel reflex to direct LDL Fasting   Result Value Ref Range    Cholesterol 135 <200 mg/dL    Triglycerides 170 (H) <150 mg/dL    Direct Measure HDL 55 >=50 mg/dL    LDL Cholesterol Calculated 46 <=100 mg/dL    Non HDL Cholesterol 80 <130 mg/dL    Patient Fasting > 8hrs? No     Narrative    Cholesterol  Desirable:  <200 mg/dL    Triglycerides  Normal:  Less than 150 mg/dL  Borderline High:  150-199 mg/dL  High:  200-499 mg/dL  Very High:  Greater than or equal to 500 mg/dL    Direct Measure HDL  Female:  Greater than or equal to 50 mg/dL   Male:  Greater than or equal to 40 mg/dL    LDL Cholesterol  Desirable:  <100mg/dL  Above Desirable:  100-129 mg/dL   Borderline High:  130-159 mg/dL   High:  160-189 mg/dL   Very High:  >= 190 mg/dL    Non HDL Cholesterol  Desirable:  130 mg/dL  Above Desirable:  130-159 mg/dL  Borderline High:  160-189 mg/dL  High:  190-219 mg/dL  Very High:  Greater than or equal to 220 mg/dL       If you have any questions or concerns, please call the clinic at the number listed above.       Sincerely,      Jessica Melgar PA-C

## 2024-07-10 ENCOUNTER — TELEPHONE (OUTPATIENT)
Dept: FAMILY MEDICINE | Facility: CLINIC | Age: 74
End: 2024-07-10
Payer: MEDICARE

## 2024-07-10 NOTE — TELEPHONE ENCOUNTER
Test Results    Contacts       Contact Date/Time Type Contact Phone/Fax    07/10/2024 02:12 PM CDT Phone (Incoming) Jeannine Carpio (Self) [phone] (M)            Who ordered the test:  Dr. Melgar    Type of test: Lab and thyroid, cholesterol test    Date of test:  6/28/2024    Where was the test performed:  [place]    What are your questions/concerns?:  Would like a paper copy sent to her house with her lab results    Okay to leave a detailed message?: Yes at Home number on file [phone] ([place])

## 2024-07-17 PROCEDURE — 93244 EXT ECG>48HR<7D REV&INTERPJ: CPT | Performed by: INTERNAL MEDICINE

## 2024-07-18 ENCOUNTER — TELEPHONE (OUTPATIENT)
Dept: FAMILY MEDICINE | Facility: CLINIC | Age: 74
End: 2024-07-18
Payer: MEDICARE

## 2024-07-18 NOTE — TELEPHONE ENCOUNTER
"Notified Patient of EDITA Keating, CNP's message: \"Please notify patient that the monitor did not note any atrial fibrillation, however she did have some runs of SVT as well as having frequent premature beats. Please let me know if patient has any further questions or if she would like to have a phone visit to discuss results further.\"    Person was given an opportunity to ask questions, verbalized understanding of plan. Interested in office visit to discuss SVT further. Scheduled for 7/23 with EDITA Keating, CNP.    Lucille Melgar RN on 7/18/2024 at 11:31 AM    "

## 2024-07-18 NOTE — TELEPHONE ENCOUNTER
----- Message from Milton Stewart sent at 7/17/2024 10:01 PM CDT -----  Please notify patient that the monitor did not note any atrial fibrillation, however she did have some runs of SVT as well as having frequent premature beats. Please let me know if patient has any further questions or if she would like to have a phone visit to discuss results further.    EDITA Keating CNP

## 2024-07-23 ENCOUNTER — OFFICE VISIT (OUTPATIENT)
Dept: FAMILY MEDICINE | Facility: CLINIC | Age: 74
End: 2024-07-23
Payer: MEDICARE

## 2024-07-23 VITALS
TEMPERATURE: 97.5 F | RESPIRATION RATE: 12 BRPM | DIASTOLIC BLOOD PRESSURE: 73 MMHG | SYSTOLIC BLOOD PRESSURE: 128 MMHG | HEART RATE: 75 BPM | OXYGEN SATURATION: 98 % | WEIGHT: 99.9 LBS | BODY MASS INDEX: 17.05 KG/M2 | HEIGHT: 64 IN

## 2024-07-23 DIAGNOSIS — R42 DIZZINESS: ICD-10-CM

## 2024-07-23 DIAGNOSIS — R00.2 PALPITATIONS: Primary | ICD-10-CM

## 2024-07-23 PROCEDURE — G2211 COMPLEX E/M VISIT ADD ON: HCPCS

## 2024-07-23 PROCEDURE — 99213 OFFICE O/P EST LOW 20 MIN: CPT

## 2024-07-23 ASSESSMENT — PAIN SCALES - GENERAL: PAINLEVEL: NO PAIN (0)

## 2024-07-23 NOTE — PROGRESS NOTES
Assessment & Plan     (R00.2) Palpitations  (primary encounter diagnosis)  (R42) Dizziness  Comment: discussed zio patch results and after discussion shared decision made to have patient be seen by cardiology. Will get echocardiogram as well to evaluate function prior. Will follow up on results and whether further direction is necessary. Discussed cardiac red flags and when to present to ED if necessary. Patient fully understands and is agreeable with plan of care, at this point patient will follow up as needed unless acute concerns arise in the meantime.  Plan: Echocardiogram Complete with Hao, Adult         Cardiology Mildredal Loree Referral    The longitudinal plan of care for the diagnosis(es)/condition(s) as documented were addressed during this visit. Due to the added complexity in care, I will continue to support Jeannine in the subsequent management and with ongoing continuity of care.  Barbra Paez is a 74 year old, presenting for the following health issues:  cardiac monitoring follow up (Review Zio Patch results)        7/23/2024    10:44 AM   Additional Questions   Roomed by Orquidea Rascon   Accompanied by Daughter / Susan     History of Present Illness       She eats 2-3 servings of fruits and vegetables daily.She consumes 1 sweetened beverage(s) daily.She exercises with enough effort to increase her heart rate 9 or less minutes per day.  She exercises with enough effort to increase her heart rate 3 or less days per week.   She is taking medications regularly.       Pt is being seen today to discuss results from wearing a Zio Patch Cardiac Monitor for 7 days.     Still feeling palpitations daily, does have intermittent dizziness occasionally. About the same since last visit. No major changes.    Patient hydration and nutrition adequate, however has had a more difficult time eating since  passed away.    Denies any additional cardiac red flags    Review of Systems  Constitutional,  "HEENT, cardiovascular, pulmonary, gi and gu systems are negative, except as otherwise noted.      Objective    /73 (BP Location: Right arm, Patient Position: Sitting, Cuff Size: Adult Small)   Pulse 75   Temp 97.5  F (36.4  C) (Oral)   Resp 12   Ht 1.626 m (5' 4\")   Wt 45.3 kg (99 lb 14.4 oz)   SpO2 98%   BMI 17.15 kg/m    Body mass index is 17.15 kg/m .  Physical Exam  Vitals and nursing note reviewed.   Constitutional:       General: She is not in acute distress.     Appearance: Normal appearance. She is not ill-appearing.   Cardiovascular:      Rate and Rhythm: Normal rate.   Pulmonary:      Effort: Pulmonary effort is normal.   Skin:     General: Skin is warm and dry.   Neurological:      Mental Status: She is alert.   Psychiatric:         Mood and Affect: Mood is anxious.         Behavior: Behavior normal.         Thought Content: Thought content normal.         Judgment: Judgment normal.          Signed Electronically by: EDITA Keating CNP    "

## 2024-08-15 ENCOUNTER — HOSPITAL ENCOUNTER (OUTPATIENT)
Dept: CARDIOLOGY | Facility: CLINIC | Age: 74
Discharge: HOME | End: 2024-08-15
Payer: MEDICARE

## 2024-08-15 DIAGNOSIS — R00.2 PALPITATIONS: ICD-10-CM

## 2024-08-15 DIAGNOSIS — R42 DIZZINESS: ICD-10-CM

## 2024-08-15 LAB — LVEF ECHO: NORMAL

## 2024-08-15 PROCEDURE — 93306 TTE W/DOPPLER COMPLETE: CPT

## 2024-08-15 PROCEDURE — 93306 TTE W/DOPPLER COMPLETE: CPT | Mod: 26 | Performed by: INTERNAL MEDICINE

## 2024-08-16 ENCOUNTER — TELEPHONE (OUTPATIENT)
Dept: FAMILY MEDICINE | Facility: CLINIC | Age: 74
End: 2024-08-16
Payer: MEDICARE

## 2024-08-16 NOTE — TELEPHONE ENCOUNTER
Test Results    Contacts       Contact Date/Time Type Contact Phone/Fax    08/16/2024 11:35 AM CDT Phone (Incoming) Jeannine Carpio (Self) [phone] (M)            Who ordered the test:  Milton Stewart CNP    Type of test: Echocardiogram    Date of test:  8/15/2024    Where was the test performed:  [facility]    What are your questions/concerns?:  Patient is requesting a call back to discuss results from echocardiogram.    Okay to leave a detailed message?: Yes at Cell number on file:    Telephone Information:   Mobile [phone]

## 2024-08-16 NOTE — TELEPHONE ENCOUNTER
RN spoke to patient     Reviewed echo results and results note from Milton KOHLER CNP      Patient states understanding   Will follow up with pcp in December for AWV and discuss liver cyst   Cardiology appt scheduled on 8/28/2024 will discuss further with them at that time     Chelsey Ocampo, Registered Nurse  [facility]

## 2024-08-28 ENCOUNTER — OFFICE VISIT (OUTPATIENT)
Dept: CARDIOLOGY | Facility: CLINIC | Age: 74
End: 2024-08-28
Payer: MEDICARE

## 2024-08-28 VITALS
SYSTOLIC BLOOD PRESSURE: 120 MMHG | OXYGEN SATURATION: 98 % | HEIGHT: 64 IN | WEIGHT: 100.7 LBS | BODY MASS INDEX: 17.19 KG/M2 | HEART RATE: 75 BPM | DIASTOLIC BLOOD PRESSURE: 68 MMHG

## 2024-08-28 DIAGNOSIS — R00.2 PALPITATIONS: ICD-10-CM

## 2024-08-28 DIAGNOSIS — R42 DIZZINESS: ICD-10-CM

## 2024-08-28 PROCEDURE — 99204 OFFICE O/P NEW MOD 45 MIN: CPT | Performed by: INTERNAL MEDICINE

## 2024-08-28 NOTE — PROGRESS NOTES
HPI and Plan:   Jeannine Carpio is a 74 year old female who presents with history of palpitations with underlying active heavy tobacco history, emphysema, hypothyroidism and family history of SVT.  She wore a heart monitor and had an echocardiogram and was asked to follow-up in cardiology clinic today.  On the heart monitor she did have frequent PACs with about a 12% burden on 6-day monitor.  She had very short runs of SVT, no evidence of atrial fibrillation.  She did have palpitation symptoms while wearing the heart monitor, however in recent days her symptoms have started to subside.  She does have emphysema but does not use inhalers on a regular basis and denies any difficulty breathing.  She had a recent chest CT in January for screening for lung cancer which was negative and she did not have any coronary calcifications noted on this chest CT.  She is on cholesterol-lowering medication with rosuvastatin and had a cholesterol profile in June showing total 135 HDL 55 LDL 46 and triglycerides 170.  An echocardiogram performed a couple weeks ago shows a normal LV and RV function with EF estimated at 60 to 65%.  Normal intracardiac pressures and estimated pulmonary pressures.  She did have an incidental liver cyst noted measuring 3.8 cm in size and mild atheromatous changes in her aorta.  She is normotensive on exam today thin with a BMI of 17 and occasional extra heartbeats on auscultation but no murmur    Summary    1.  Atrial ectopy-likely benign, no sustained arrhythmias.  Reassurance and no further evaluation needed at this time.  I did discuss cardiac red flags including sustained fast heart rhythms, irregular or fast heart rhythms that may indicate a more serious issue.  Underlying lung disease and thyroid disease put her at risk for atrial fibrillation.    2.  Ongoing tobacco use-she has atheromatous changes noted in her aorta suggesting atherosclerosis, fortunately no coronary calcifications noted on chest  CT.  Smoking cessation advised  I am happy to see her back at any time for any of her future cardiovascular needs, please feel free to contact me with any questions you have regards to her care         Today's clinic visit entailed:  Review of external notes as documented elsewhere in note  Review of the result(s) of each unique test - chest CT, echo, ziopatch,Lipids    Provider  Link to MDM Help Grid     The level of medical decision making during this visit was of moderate complexity.    Orders Placed This Encounter   Procedures    EKG 12-lead complete w/read - Clinics (performed today)     No orders of the defined types were placed in this encounter.    There are no discontinued medications.      Encounter Diagnoses   Name Primary?    Palpitations     Dizziness        CURRENT MEDICATIONS:  Current Outpatient Medications   Medication Sig Dispense Refill    albuterol (PROAIR HFA/PROVENTIL HFA/VENTOLIN HFA) 108 (90 Base) MCG/ACT inhaler Inhale 2 puffs into the lungs every 6 hours 8.5 g 4    Cholecalciferol (VITAMIN D) 2000 UNIT tablet Take 1,000 Units by mouth daily       levothyroxine (SYNTHROID/LEVOTHROID) 50 MCG tablet Take 1 tablet (50 mcg) by mouth daily 90 tablet 1    OMEPRAZOLE PO       rosuvastatin (CRESTOR) 10 MG tablet Take 1 tablet (10 mg) by mouth daily 90 tablet 1       ALLERGIES     Allergies   Allergen Reactions    Ibuprofen Hives    Penicillins Hives    Tetracyclines & Related Hives       PAST MEDICAL HISTORY:  Past Medical History:   Diagnosis Date    Abnormal glandular Papanicolaou smear of cervix     cryo and LEEP done, normal since    Delivery normal      1 miscarriage    Menopause age 54       PAST SURGICAL HISTORY:  Past Surgical History:   Procedure Laterality Date    LEEP TX, CERVICAL      LEEP TX Cervical       FAMILY HISTORY:  Family History   Problem Relation Age of Onset    Diabetes Mother     Cerebrovascular Disease Mother     Arthritis Mother     Eye Disorder Mother          Cataracts    Heart Disease Mother         CHF, A-Fib    Osteoporosis Mother     Thyroid Disease Mother         Hypothyroidism    Alzheimer Disease Father     Eye Disorder Father         Cataracts    Heart Disease Father         Bypass    Thyroid Disease Maternal Grandmother     Thyroid Disease Brother         Hyperthyroidism    Alcoholism Brother     Depression Sister     Thyroid Disease Sister     Thyroid Disease Daughter         Hypothyroid       SOCIAL HISTORY:  Social History     Socioeconomic History    Marital status:      Spouse name: Damon    Number of children: 6    Years of education: None    Highest education level: 12th grade   Occupational History    Occupation: packagaing line, [employer]     Employer: [employer]   Tobacco Use    Smoking status: Every Day     Current packs/day: 0.75     Average packs/day: 0.8 packs/day for 57.7 years (43.2 ttl pk-yrs)     Types: Cigarettes     Start date: 1967    Smokeless tobacco: Never    Tobacco comments:     14 cigarettes a day   Vaping Use    Vaping status: Never Used   Substance and Sexual Activity    Alcohol use: No    Drug use: No    Sexual activity: Not Currently     Partners: Male     Social Determinants of Health     Financial Resource Strain: Low Risk  (12/14/2023)    Financial Resource Strain     Within the past 12 months, have you or your family members you live with been unable to get utilities (heat, electricity) when it was really needed?: No   Food Insecurity: Low Risk  (12/14/2023)    Food Insecurity     Within the past 12 months, did you worry that your food would run out before you got money to buy more?: No     Within the past 12 months, did the food you bought just not last and you didn t have money to get more?: No   Transportation Needs: Low Risk  (12/14/2023)    Transportation Needs     Within the past 12 months, has lack of transportation kept you from medical appointments, getting your medicines, non-medical meetings or appointments, work,  "or from getting things that you need?: No   Physical Activity: Unknown (12/14/2023)    Exercise Vital Sign     Days of Exercise per Week: 1 day   Stress: Stress Concern Present (12/14/2023)    [employer] [employer] - Occupational Stress Questionnaire     Feeling of Stress : To some extent   Social Connections: Unknown (12/14/2023)    Social Connection and Isolation Panel [NHANES]     Frequency of Communication with Friends and Family: Twice a week     Active Member of Clubs or Organizations: No     Attends Club or Organization Meetings: Never     Marital Status:    Interpersonal Safety: Low Risk  (12/14/2023)    Interpersonal Safety     Do you feel physically and emotionally safe where you currently live?: Yes     Within the past 12 months, have you been hit, slapped, kicked or otherwise physically hurt by someone?: No     Within the past 12 months, have you been humiliated or emotionally abused in other ways by your partner or ex-partner?: No   Housing Stability: High Risk (12/14/2023)    Housing Stability     Do you have housing? : No     Are you worried about losing your housing?: No       Review of Systems:  Skin:        Eyes:       ENT:       Respiratory:  Negative    Cardiovascular:    Positive for;palpitations;dizziness  Gastroenterology:      Genitourinary:       Musculoskeletal:       Neurologic:       Psychiatric:       Heme/Lymph/Imm:       Endocrine:         Physical Exam:  Vitals: /68 (BP Location: Right arm, Patient Position: Sitting, Cuff Size: Adult Small)   Pulse 75   Ht 1.626 m (5' 4\")   Wt 45.7 kg (100 lb 11.2 oz)   SpO2 98%   BMI 17.29 kg/m      Constitutional:  cooperative;in no acute distress        Skin:  warm and dry to the touch          Head:  normocephalic        Eyes:  pupils equal and round        Lymph:      ENT:  no pallor or cyanosis        Neck:  no carotid bruit        Respiratory:  clear to auscultation         Cardiac: regular rhythm;no " murmurs, gallops or rubs detected occasional premature beats                pulses below the femoral arteries are diminished                                      GI:  abdomen soft        Extremities and Muscular Skeletal:  no deformities, clubbing, cyanosis, erythema observed;no edema              Neurological:  no gross motor deficits;affect appropriate        Psych:  Alert and Oriented x 3        Recent Lab Results:  LIPID RESULTS:  Lab Results   Component Value Date    CHOL 135 06/28/2024    CHOL 183 11/10/2020    HDL 55 06/28/2024    HDL 48 (L) 11/10/2020    LDL 46 06/28/2024     (H) 11/10/2020    TRIG 170 (H) 06/28/2024    TRIG 148 11/10/2020    CHOLHDLRATIO 5.0 08/18/2015       LIVER ENZYME RESULTS:  Lab Results   Component Value Date    AST 22 12/14/2023    AST 20 11/10/2020    ALT 19 12/14/2023    ALT 26 11/10/2020       CBC RESULTS:  Lab Results   Component Value Date    WBC 7.0 11/21/2022    WBC 7.6 11/10/2020    RBC 5.19 11/21/2022    RBC 5.05 11/10/2020    HGB 15.7 11/21/2022    HGB 15.6 11/10/2020    HCT 47.0 11/21/2022    HCT 46.0 11/10/2020    MCV 91 11/21/2022    MCV 91 11/10/2020    MCH 30.3 11/21/2022    MCH 30.9 11/10/2020    MCHC 33.4 11/21/2022    MCHC 33.9 11/10/2020    RDW 13.2 11/21/2022    RDW 13.3 11/10/2020     11/21/2022     11/10/2020       BMP RESULTS:  Lab Results   Component Value Date     12/14/2023     11/10/2020    POTASSIUM 3.9 12/14/2023    POTASSIUM 4.0 11/11/2021    POTASSIUM 3.9 11/10/2020    CHLORIDE 101 12/14/2023    CHLORIDE 105 11/11/2021    CHLORIDE 106 11/10/2020    CO2 27 12/14/2023    CO2 29 11/11/2021    CO2 27 11/10/2020    ANIONGAP 11 12/14/2023    ANIONGAP 3 11/11/2021    ANIONGAP 6 11/10/2020     (H) 12/14/2023     (H) 11/11/2021     (H) 11/10/2020    BUN 13.4 12/14/2023    BUN 11 11/11/2021    BUN 9 11/10/2020    CR 0.67 12/14/2023    CR 0.65 11/10/2020    GFRESTIMATED >90 12/14/2023    GFRESTIMATED >90  "11/10/2020    GFRESTBLACK >90 11/10/2020    CARLOS 9.5 12/14/2023    CARLOS 9.1 11/10/2020        A1C RESULTS:  Lab Results   Component Value Date    A1C 5.6 12/14/2023    A1C 5.7 (H) 11/10/2020       INR RESULTS:  No results found for: \"INR\"        EDITA Aparicio CNP  [address] [place] [address]                "

## 2024-08-28 NOTE — LETTER
8/28/2024    Jessica Melgar PA-C  [address] [facility] [id number]    RE: Jeannine Carpio       Dear Colleague,     I had the pleasure of seeing Jeannine Carpio in the [facility].  HPI and Plan:   Jeannine Carpio is a 74 year old female who presents with history of palpitations with underlying active heavy tobacco history, emphysema, hypothyroidism and family history of SVT.  She wore a heart monitor and had an echocardiogram and was asked to follow-up in cardiology clinic today.  On the heart monitor she did have frequent PACs with about a 12% burden on 6-day monitor.  She had very short runs of SVT, no evidence of atrial fibrillation.  She did have palpitation symptoms while wearing the heart monitor, however in recent days her symptoms have started to subside.  She does have emphysema but does not use inhalers on a regular basis and denies any difficulty breathing.  She had a recent chest CT in January for screening for lung cancer which was negative and she did not have any coronary calcifications noted on this chest CT.  She is on cholesterol-lowering medication with rosuvastatin and had a cholesterol profile in June showing total 135 HDL 55 LDL 46 and triglycerides 170.  An echocardiogram performed a couple weeks ago shows a normal LV and RV function with EF estimated at 60 to 65%.  Normal intracardiac pressures and estimated pulmonary pressures.  She did have an incidental liver cyst noted measuring 3.8 cm in size and mild atheromatous changes in her aorta.  She is normotensive on exam today thin with a BMI of 17 and occasional extra heartbeats on auscultation but no murmur    Summary    1.  Atrial ectopy-likely benign, no sustained arrhythmias.  Reassurance and no further evaluation needed at this time.  I did discuss cardiac red flags including sustained fast heart rhythms, irregular or fast heart rhythms that may indicate a more serious issue.  Underlying lung disease and thyroid  disease put her at risk for atrial fibrillation.    2.  Ongoing tobacco use-she has atheromatous changes noted in her aorta suggesting atherosclerosis, fortunately no coronary calcifications noted on chest CT.  Smoking cessation advised  I am happy to see her back at any time for any of her future cardiovascular needs, please feel free to contact me with any questions you have regards to her care         Today's clinic visit entailed:  Review of external notes as documented elsewhere in note  Review of the result(s) of each unique test - chest CT, echo, ziopatch,Lipids    Provider  Link to MDM Help Grid     The level of medical decision making during this visit was of moderate complexity.    Orders Placed This Encounter   Procedures     EKG 12-lead complete w/read - Clinics (performed today)     No orders of the defined types were placed in this encounter.    There are no discontinued medications.      Encounter Diagnoses   Name Primary?     Palpitations      Dizziness        CURRENT MEDICATIONS:  Current Outpatient Medications   Medication Sig Dispense Refill     albuterol (PROAIR HFA/PROVENTIL HFA/VENTOLIN HFA) 108 (90 Base) MCG/ACT inhaler Inhale 2 puffs into the lungs every 6 hours 8.5 g 4     Cholecalciferol (VITAMIN D) 2000 UNIT tablet Take 1,000 Units by mouth daily        levothyroxine (SYNTHROID/LEVOTHROID) 50 MCG tablet Take 1 tablet (50 mcg) by mouth daily 90 tablet 1     OMEPRAZOLE PO        rosuvastatin (CRESTOR) 10 MG tablet Take 1 tablet (10 mg) by mouth daily 90 tablet 1       ALLERGIES     Allergies   Allergen Reactions     Ibuprofen Hives     Penicillins Hives     Tetracyclines & Related Hives       PAST MEDICAL HISTORY:  Past Medical History:   Diagnosis Date     Abnormal glandular Papanicolaou smear of cervix     cryo and LEEP done, normal since     Delivery normal      1 miscarriage     Menopause age 54       PAST SURGICAL HISTORY:  Past Surgical History:   Procedure Laterality Date      LEEP TX, CERVICAL      LEEP TX Cervical       FAMILY HISTORY:  Family History   Problem Relation Age of Onset     Diabetes Mother      Cerebrovascular Disease Mother      Arthritis Mother      Eye Disorder Mother         Cataracts     Heart Disease Mother         CHF, A-Fib     Osteoporosis Mother      Thyroid Disease Mother         Hypothyroidism     Alzheimer Disease Father      Eye Disorder Father         Cataracts     Heart Disease Father         Bypass     Thyroid Disease Maternal Grandmother      Thyroid Disease Brother         Hyperthyroidism     Alcoholism Brother      Depression Sister      Thyroid Disease Sister      Thyroid Disease Daughter         Hypothyroid       SOCIAL HISTORY:  Social History     Socioeconomic History     Marital status:      Spouse name: Damon     Number of children: 6     Years of education: None     Highest education level: 12th grade   Occupational History     Occupation: packagaing line, [employer]     Employer: [employer]   Tobacco Use     Smoking status: Every Day     Current packs/day: 0.75     Average packs/day: 0.8 packs/day for 57.7 years (43.2 ttl pk-yrs)     Types: Cigarettes     Start date: 1967     Smokeless tobacco: Never     Tobacco comments:     14 cigarettes a day   Vaping Use     Vaping status: Never Used   Substance and Sexual Activity     Alcohol use: No     Drug use: No     Sexual activity: Not Currently     Partners: Male     Social Determinants of Health     Financial Resource Strain: Low Risk  (12/14/2023)    Financial Resource Strain      Within the past 12 months, have you or your family members you live with been unable to get utilities (heat, electricity) when it was really needed?: No   Food Insecurity: Low Risk  (12/14/2023)    Food Insecurity      Within the past 12 months, did you worry that your food would run out before you got money to buy more?: No      Within the past 12 months, did the food you bought just not last and you didn t have money  "to get more?: No   Transportation Needs: Low Risk  (12/14/2023)    Transportation Needs      Within the past 12 months, has lack of transportation kept you from medical appointments, getting your medicines, non-medical meetings or appointments, work, or from getting things that you need?: No   Physical Activity: Unknown (12/14/2023)    Exercise Vital Sign      Days of Exercise per Week: 1 day   Stress: Stress Concern Present (12/14/2023)    [employer] [place] [employer] - Occupational Stress Questionnaire      Feeling of Stress : To some extent   Social Connections: Unknown (12/14/2023)    Social Connection and Isolation Panel [NHANES]      Frequency of Communication with Friends and Family: Twice a week      Active Member of Clubs or Organizations: No      Attends Club or Organization Meetings: Never      Marital Status:    Interpersonal Safety: Low Risk  (12/14/2023)    Interpersonal Safety      Do you feel physically and emotionally safe where you currently live?: Yes      Within the past 12 months, have you been hit, slapped, kicked or otherwise physically hurt by someone?: No      Within the past 12 months, have you been humiliated or emotionally abused in other ways by your partner or ex-partner?: No   Housing Stability: High Risk (12/14/2023)    Housing Stability      Do you have housing? : No      Are you worried about losing your housing?: No       Review of Systems:  Skin:        Eyes:       ENT:       Respiratory:  Negative    Cardiovascular:    Positive for;palpitations;dizziness  Gastroenterology:      Genitourinary:       Musculoskeletal:       Neurologic:       Psychiatric:       Heme/Lymph/Imm:       Endocrine:         Physical Exam:  Vitals: /68 (BP Location: Right arm, Patient Position: Sitting, Cuff Size: Adult Small)   Pulse 75   Ht 1.626 m (5' 4\")   Wt 45.7 kg (100 lb 11.2 oz)   SpO2 98%   BMI 17.29 kg/m      Constitutional:  cooperative;in no acute distress    "     Skin:  warm and dry to the touch          Head:  normocephalic        Eyes:  pupils equal and round        Lymph:      ENT:  no pallor or cyanosis        Neck:  no carotid bruit        Respiratory:  clear to auscultation         Cardiac: regular rhythm;no murmurs, gallops or rubs detected occasional premature beats                pulses below the femoral arteries are diminished                                      GI:  abdomen soft        Extremities and Muscular Skeletal:  no deformities, clubbing, cyanosis, erythema observed;no edema              Neurological:  no gross motor deficits;affect appropriate        Psych:  Alert and Oriented x 3        Recent Lab Results:  LIPID RESULTS:  Lab Results   Component Value Date    CHOL 135 06/28/2024    CHOL 183 11/10/2020    HDL 55 06/28/2024    HDL 48 (L) 11/10/2020    LDL 46 06/28/2024     (H) 11/10/2020    TRIG 170 (H) 06/28/2024    TRIG 148 11/10/2020    CHOLHDLRATIO 5.0 08/18/2015       LIVER ENZYME RESULTS:  Lab Results   Component Value Date    AST 22 12/14/2023    AST 20 11/10/2020    ALT 19 12/14/2023    ALT 26 11/10/2020       CBC RESULTS:  Lab Results   Component Value Date    WBC 7.0 11/21/2022    WBC 7.6 11/10/2020    RBC 5.19 11/21/2022    RBC 5.05 11/10/2020    HGB 15.7 11/21/2022    HGB 15.6 11/10/2020    HCT 47.0 11/21/2022    HCT 46.0 11/10/2020    MCV 91 11/21/2022    MCV 91 11/10/2020    MCH 30.3 11/21/2022    MCH 30.9 11/10/2020    MCHC 33.4 11/21/2022    MCHC 33.9 11/10/2020    RDW 13.2 11/21/2022    RDW 13.3 11/10/2020     11/21/2022     11/10/2020       BMP RESULTS:  Lab Results   Component Value Date     12/14/2023     11/10/2020    POTASSIUM 3.9 12/14/2023    POTASSIUM 4.0 11/11/2021    POTASSIUM 3.9 11/10/2020    CHLORIDE 101 12/14/2023    CHLORIDE 105 11/11/2021    CHLORIDE 106 11/10/2020    CO2 27 12/14/2023    CO2 29 11/11/2021    CO2 27 11/10/2020    ANIONGAP 11 12/14/2023    ANIONGAP 3 11/11/2021     "ANIONGAP 6 11/10/2020     (H) 12/14/2023     (H) 11/11/2021     (H) 11/10/2020    BUN 13.4 12/14/2023    BUN 11 11/11/2021    BUN 9 11/10/2020    CR 0.67 12/14/2023    CR 0.65 11/10/2020    GFRESTIMATED >90 12/14/2023    GFRESTIMATED >90 11/10/2020    GFRESTBLACK >90 11/10/2020    CARLOS 9.5 12/14/2023    CARLOS 9.1 11/10/2020        A1C RESULTS:  Lab Results   Component Value Date    A1C 5.6 12/14/2023    A1C 5.7 (H) 11/10/2020       INR RESULTS:  No results found for: \"INR\"        CC  EDITA Keating CNP  [id number] [phone]                  Thank you for allowing me to participate in the care of your patient.      Sincerely,     Jennifer Diaz DO     [facility]  cc:   EDITA Keating CNP  [address] [phone]      "

## 2024-11-14 ENCOUNTER — TELEPHONE (OUTPATIENT)
Dept: FAMILY MEDICINE | Facility: CLINIC | Age: 74
End: 2024-11-14
Payer: MEDICARE

## 2024-11-14 NOTE — TELEPHONE ENCOUNTER
Patient Quality Outreach    Patient is due for the following:   Physical Annual Wellness Visit      Topic Date Due    Pneumococcal Vaccine (1 of 2 - PCV) Never done    Zoster (Shingles) Vaccine (1 of 2) Never done    Diptheria Tetanus Pertussis (DTAP/TDAP/TD) Vaccine (1 - Tdap) 10/14/2020    Flu Vaccine (1) Never done    COVID-19 Vaccine (3 - 2024-25 season) 09/01/2024       Action(s) Taken:   Patient has upcoming appointment, these items will be addressed at that time.    Type of outreach:    Chart review performed, no outreach needed.    Questions for provider review:               Lin Gilliam MA

## 2024-12-03 DIAGNOSIS — E78.5 HYPERLIPIDEMIA LDL GOAL <100: ICD-10-CM

## 2024-12-04 RX ORDER — ROSUVASTATIN CALCIUM 10 MG/1
10 TABLET, COATED ORAL DAILY
Qty: 90 TABLET | Refills: 0 | Status: SHIPPED | OUTPATIENT
Start: 2024-12-04

## 2025-01-14 ENCOUNTER — OFFICE VISIT (OUTPATIENT)
Dept: FAMILY MEDICINE | Facility: CLINIC | Age: 75
End: 2025-01-14
Payer: MEDICARE

## 2025-01-14 ENCOUNTER — ORDERS ONLY (AUTO-RELEASED) (OUTPATIENT)
Dept: FAMILY MEDICINE | Facility: CLINIC | Age: 75
End: 2025-01-14

## 2025-01-14 VITALS
SYSTOLIC BLOOD PRESSURE: 132 MMHG | HEIGHT: 64 IN | RESPIRATION RATE: 20 BRPM | DIASTOLIC BLOOD PRESSURE: 80 MMHG | OXYGEN SATURATION: 99 % | HEART RATE: 84 BPM | BODY MASS INDEX: 17.42 KG/M2 | TEMPERATURE: 97.5 F | WEIGHT: 102 LBS

## 2025-01-14 DIAGNOSIS — J43.9 PULMONARY EMPHYSEMA, UNSPECIFIED EMPHYSEMA TYPE (H): ICD-10-CM

## 2025-01-14 DIAGNOSIS — E78.5 HYPERLIPIDEMIA LDL GOAL <100: ICD-10-CM

## 2025-01-14 DIAGNOSIS — E55.9 VITAMIN D INSUFFICIENCY: ICD-10-CM

## 2025-01-14 DIAGNOSIS — Z00.00 ENCOUNTER FOR MEDICARE ANNUAL WELLNESS EXAM: Primary | ICD-10-CM

## 2025-01-14 DIAGNOSIS — Z12.11 SCREEN FOR COLON CANCER: ICD-10-CM

## 2025-01-14 DIAGNOSIS — E03.9 HYPOTHYROIDISM, UNSPECIFIED TYPE: ICD-10-CM

## 2025-01-14 DIAGNOSIS — Z83.3 FAMILY HISTORY OF DIABETES MELLITUS: ICD-10-CM

## 2025-01-14 DIAGNOSIS — F33.1 MODERATE EPISODE OF RECURRENT MAJOR DEPRESSIVE DISORDER (H): ICD-10-CM

## 2025-01-14 LAB
ALBUMIN SERPL BCG-MCNC: 4.4 G/DL (ref 3.5–5.2)
ALP SERPL-CCNC: 84 U/L (ref 40–150)
ALT SERPL W P-5'-P-CCNC: 26 U/L (ref 0–50)
ANION GAP SERPL CALCULATED.3IONS-SCNC: 9 MMOL/L (ref 7–15)
AST SERPL W P-5'-P-CCNC: 29 U/L (ref 0–45)
BILIRUB SERPL-MCNC: 0.9 MG/DL
BUN SERPL-MCNC: 12.5 MG/DL (ref 8–23)
CALCIUM SERPL-MCNC: 9.7 MG/DL (ref 8.8–10.4)
CHLORIDE SERPL-SCNC: 102 MMOL/L (ref 98–107)
CHOLEST SERPL-MCNC: 147 MG/DL
CREAT SERPL-MCNC: 0.69 MG/DL (ref 0.51–0.95)
EGFRCR SERPLBLD CKD-EPI 2021: >90 ML/MIN/1.73M2
EST. AVERAGE GLUCOSE BLD GHB EST-MCNC: 120 MG/DL
FASTING STATUS PATIENT QL REPORTED: ABNORMAL
FASTING STATUS PATIENT QL REPORTED: NORMAL
GLUCOSE SERPL-MCNC: 106 MG/DL (ref 70–99)
HBA1C MFR BLD: 5.8 % (ref 0–5.6)
HCO3 SERPL-SCNC: 28 MMOL/L (ref 22–29)
HDLC SERPL-MCNC: 57 MG/DL
LDLC SERPL CALC-MCNC: 66 MG/DL
NONHDLC SERPL-MCNC: 90 MG/DL
POTASSIUM SERPL-SCNC: 4.2 MMOL/L (ref 3.4–5.3)
PROT SERPL-MCNC: 6.8 G/DL (ref 6.4–8.3)
SODIUM SERPL-SCNC: 139 MMOL/L (ref 135–145)
TRIGL SERPL-MCNC: 121 MG/DL
TSH SERPL DL<=0.005 MIU/L-ACNC: 3.71 UIU/ML (ref 0.3–4.2)

## 2025-01-14 PROCEDURE — 84443 ASSAY THYROID STIM HORMONE: CPT

## 2025-01-14 PROCEDURE — 36415 COLL VENOUS BLD VENIPUNCTURE: CPT

## 2025-01-14 PROCEDURE — G0439 PPPS, SUBSEQ VISIT: HCPCS

## 2025-01-14 PROCEDURE — 83036 HEMOGLOBIN GLYCOSYLATED A1C: CPT

## 2025-01-14 PROCEDURE — G2211 COMPLEX E/M VISIT ADD ON: HCPCS

## 2025-01-14 PROCEDURE — 99214 OFFICE O/P EST MOD 30 MIN: CPT | Mod: 25

## 2025-01-14 PROCEDURE — 80061 LIPID PANEL: CPT

## 2025-01-14 PROCEDURE — 80053 COMPREHEN METABOLIC PANEL: CPT

## 2025-01-14 RX ORDER — ROSUVASTATIN CALCIUM 10 MG/1
10 TABLET, COATED ORAL DAILY
Qty: 90 TABLET | Refills: 4 | Status: SHIPPED | OUTPATIENT
Start: 2025-01-14

## 2025-01-14 RX ORDER — ALBUTEROL SULFATE 90 UG/1
2 INHALANT RESPIRATORY (INHALATION) EVERY 6 HOURS
Qty: 8.5 G | Refills: 4 | Status: SHIPPED | OUTPATIENT
Start: 2025-01-14

## 2025-01-14 RX ORDER — LEVOTHYROXINE SODIUM 50 UG/1
50 TABLET ORAL DAILY
Qty: 90 TABLET | Refills: 4 | Status: SHIPPED | OUTPATIENT
Start: 2025-01-14

## 2025-01-14 SDOH — HEALTH STABILITY: PHYSICAL HEALTH: ON AVERAGE, HOW MANY DAYS PER WEEK DO YOU ENGAGE IN MODERATE TO STRENUOUS EXERCISE (LIKE A BRISK WALK)?: 1 DAY

## 2025-01-14 ASSESSMENT — ANXIETY QUESTIONNAIRES
3. WORRYING TOO MUCH ABOUT DIFFERENT THINGS: SEVERAL DAYS
5. BEING SO RESTLESS THAT IT IS HARD TO SIT STILL: MORE THAN HALF THE DAYS
2. NOT BEING ABLE TO STOP OR CONTROL WORRYING: SEVERAL DAYS
GAD7 TOTAL SCORE: 6
8. IF YOU CHECKED OFF ANY PROBLEMS, HOW DIFFICULT HAVE THESE MADE IT FOR YOU TO DO YOUR WORK, TAKE CARE OF THINGS AT HOME, OR GET ALONG WITH OTHER PEOPLE?: SOMEWHAT DIFFICULT
6. BECOMING EASILY ANNOYED OR IRRITABLE: NOT AT ALL
1. FEELING NERVOUS, ANXIOUS, OR ON EDGE: SEVERAL DAYS
4. TROUBLE RELAXING: SEVERAL DAYS
IF YOU CHECKED OFF ANY PROBLEMS ON THIS QUESTIONNAIRE, HOW DIFFICULT HAVE THESE PROBLEMS MADE IT FOR YOU TO DO YOUR WORK, TAKE CARE OF THINGS AT HOME, OR GET ALONG WITH OTHER PEOPLE: SOMEWHAT DIFFICULT
GAD7 TOTAL SCORE: 6
7. FEELING AFRAID AS IF SOMETHING AWFUL MIGHT HAPPEN: NOT AT ALL
GAD7 TOTAL SCORE: 6
7. FEELING AFRAID AS IF SOMETHING AWFUL MIGHT HAPPEN: NOT AT ALL

## 2025-01-14 ASSESSMENT — PATIENT HEALTH QUESTIONNAIRE - PHQ9
10. IF YOU CHECKED OFF ANY PROBLEMS, HOW DIFFICULT HAVE THESE PROBLEMS MADE IT FOR YOU TO DO YOUR WORK, TAKE CARE OF THINGS AT HOME, OR GET ALONG WITH OTHER PEOPLE: SOMEWHAT DIFFICULT
SUM OF ALL RESPONSES TO PHQ QUESTIONS 1-9: 8
SUM OF ALL RESPONSES TO PHQ QUESTIONS 1-9: 8

## 2025-01-14 ASSESSMENT — SOCIAL DETERMINANTS OF HEALTH (SDOH): HOW OFTEN DO YOU GET TOGETHER WITH FRIENDS OR RELATIVES?: ONCE A WEEK

## 2025-01-14 NOTE — PROGRESS NOTES
Preventive Care Visit  [facility]  Jessica Melgar PA-C, Family Medicine  Jan 14, 2025    Assessment & Plan     (Z00.00) Encounter for Medicare annual wellness exam  (primary encounter diagnosis)  Stable exam. Routine screening labs, she is fasting today. Follow up in 1 year for annual wellness; sooner as needed for acute concerns.  Plan: Lipid panel reflex to direct LDL Fasting, TSH         with free T4 reflex, Hemoglobin A1c,         Comprehensive metabolic panel (BMP + Alb, Alk         Phos, ALT, AST, Total. Bili, TP)          (E55.9) Vitamin D insufficiency  H/o this and on Vitamin D supplementation. Will continue to monitor.     (E78.5) Hyperlipidemia LDL goal <100  Well controlled with use of rosuvastatin. Check lipid panel today. No new side effects of the medication. Refill provided.  Plan: rosuvastatin (CRESTOR) 10 MG tablet          (E03.9) Hypothyroidism, unspecified type  Well controlled with use of levothyroxine. TSH check today. No new side effects of the medication. Refill provided.  Plan: TSH with free T4 reflex, levothyroxine         (SYNTHROID/LEVOTHROID) 50 MCG tablet          (F33.1) Moderate episode of recurrent major depressive disorder (H)  Mood overall has been stable. No acute concerns at this time. No medication. Continue to monitor.     (J43.9) Pulmonary emphysema, unspecified emphysema type (H)  Breathing has been good. No acute concerns. Currently using Albuterol PRN. Refilled today to have on file.   Plan: albuterol (PROAIR HFA/PROVENTIL HFA/VENTOLIN         HFA) 108 (90 Base) MCG/ACT inhaler          (Z83.3) Family history of diabetes mellitus  Plan: Hemoglobin A1c, Comprehensive metabolic panel         (BMP + Alb, Alk Phos, ALT, AST, Total. Bili,         TP)          (Z12.11) Screen for colon cancer  Plan: Fecal colorectal cancer screen FIT - Future         (S+30)    The longitudinal plan of care for the diagnosis(es)/condition(s) as documented were addressed  during this visit. Due to the added complexity in care, I will continue to support Jeannine in the subsequent management and with ongoing continuity of care.          Patient has been advised of split billing requirements and indicates understanding: Yes    Nicotine/Tobacco Cessation  She reports that she has been smoking cigarettes. She started smoking about 58 years ago. She has a 43.5 pack-year smoking history. She has never used smokeless tobacco.  Nicotine/Tobacco Cessation Plan  Information offered: Patient not interested at this time      Counseling  Appropriate preventive services were addressed with this patient via screening, questionnaire, or discussion as appropriate for fall prevention, nutrition, physical activity, Tobacco-use cessation, social engagement, weight loss and cognition.  Checklist reviewing preventive services available has been given to the patient.  Reviewed patient's diet, addressing concerns and/or questions.   She is at risk for lack of exercise and has been provided with information to increase physical activity for the benefit of her well-being.   The patient was instructed to see the dentist every 6 months.   The patient was provided with written information regarding signs of hearing loss.   The patient's PHQ-9 score is consistent with mild depression. She was provided with information regarding depression.       Follow up in 1 year for physical; sooner with any acute concerns.    Subjective   Jeannine is a 74 year old, presenting for the following:  Wellness Visit        1/14/2025     9:22 AM   Additional Questions   Roomed by Taylor MURPHY    -No acute concerns at this time. Routine visit for refill of medications.     Health Care Directive  Patient does not have a Health Care Directive: Discussed advance care planning with patient; however, patient declined at this time.      1/14/2025   General Health   How would you rate your overall physical health? (!) FAIR   Feel stress (tense,  anxious, or unable to sleep) Only a little   (!) STRESS CONCERN      1/14/2025   Nutrition   Diet: Low fat/cholesterol         1/14/2025   Exercise   Days per week of moderate/strenous exercise 1 day   (!) EXERCISE CONCERN      1/14/2025   Social Factors   Frequency of gathering with friends or relatives Once a week   Worry food won't last until get money to buy more No   Food not last or not have enough money for food? No   Do you have housing? (Housing is defined as stable permanent housing and does not include staying ouside in a car, in a tent, in an abandoned building, in an overnight shelter, or couch-surfing.) Yes   Are you worried about losing your housing? No   Lack of transportation? No   Unable to get utilities (heat,electricity)? No         1/14/2025   Fall Risk   Fallen 2 or more times in the past year? Yes    No   Trouble with walking or balance? No    No   Gait Speed Test (Document in seconds) 4   Gait Speed Test Interpretation Less than or equal to 5.00 seconds - PASS       Multiple values from one day are sorted in reverse-chronological order          1/14/2025   Activities of Daily Living- Home Safety   Needs help with the following daily activites None of the above   Safety concerns in the home None of the above         1/14/2025   Dental   Dentist two times every year? (!) NO         1/14/2025   Hearing Screening   Hearing concerns? (!) I FEEL THAT PEOPLE ARE MUMBLING OR NOT SPEAKING CLEARLY.    (!) I NEED TO ASK PEOPLE TO SPEAK UP OR REPEAT THEMSELVES.    (!) TROUBLE UNDERSTANDING SOFT OR WHISPERED SPEECH.       Multiple values from one day are sorted in reverse-chronological order         1/14/2025   Driving Risk Screening   Patient/family members have concerns about driving No         1/14/2025   General Alertness/Fatigue Screening   Have you been more tired than usual lately? No         1/14/2025   Urinary Incontinence Screening   Bothered by leaking urine in past 6 months No          1/14/2025   TB Screening   Were you born outside of the [place]? No       Today's PHQ-9 Score:       1/14/2025     9:13 AM   PHQ-9 SCORE   PHQ-9 Total Score MyChart 8 (Mild depression)   PHQ-9 Total Score 8        Patient-reported         1/14/2025   Substance Use   If I could quit smoking, I would Neutral   I want to quit somking, worry about health affects Neutral   Willing to make a plan to quit smoking Somewhat disagree   Willing to cut down before quitting Somewhat agree   Alcohol more than 3/day or more than 7/wk Not Applicable   Do you have a current opioid prescription? No   How severe/bad is pain from 1 to 10? 3/10   Do you use any other substances recreationally? No    (!) DECLINE       Multiple values from one day are sorted in reverse-chronological order     Social History     Tobacco Use    Smoking status: Every Day     Current packs/day: 0.75     Average packs/day: 0.8 packs/day for 58.0 years (43.5 ttl pk-yrs)     Types: Cigarettes     Start date: 1967    Smokeless tobacco: Never    Tobacco comments:     14 cigarettes a day   Vaping Use    Vaping status: Never Used   Substance Use Topics    Alcohol use: No    Drug use: No           1/30/2024   LAST FHS-7 RESULTS   1st degree relative breast or ovarian cancer No   Any relative bilateral breast cancer No   Any male have breast cancer No   Any ONE woman have BOTH breast AND ovarian cancer No   Any woman with breast cancer before 50yrs Yes   2 or more relatives with breast AND/OR ovarian cancer No   2 or more relatives with breast AND/OR bowel cancer No     Mammogram Screening - Mammogram every 1-2 years updated in Health Maintenance based on mutual decision making    ASCVD Risk   The 10-year ASCVD risk score (Luisito DEXTER, et al., 2019) is: 17.4%    Values used to calculate the score:      Age: 74 years      Sex: Female      Is Non- : No      Diabetic: No      Tobacco smoker: Yes      Systolic Blood Pressure: 132 mmHg      Is BP  treated: No      HDL Cholesterol: 55 mg/dL      Total Cholesterol: 135 mg/dL    Reviewed and updated as needed this visit by Provider   Tobacco  Allergies  Meds  Problems  Med Hx  Surg Hx  Fam Hx            Past Medical History:   Diagnosis Date    Abnormal glandular Papanicolaou smear of cervix     cryo and LEEP done, normal since    Delivery normal      1 miscarriage    Menopause age 54     Past Surgical History:   Procedure Laterality Date    LEEP TX, CERVICAL      LEEP TX Cervical     BP Readings from Last 3 Encounters:   25 132/80   24 120/68   24 128/73    Wt Readings from Last 3 Encounters:   25 46.3 kg (102 lb)   24 45.7 kg (100 lb 11.2 oz)   24 45.3 kg (99 lb 14.4 oz)                  Patient Active Problem List   Diagnosis    Tobacco dependence    Arthralgia    Lumbar radiculopathy    CARDIOVASCULAR SCREENING; LDL GOAL LESS THAN 130    Vaginal atrophy    Menopause    DDD (degenerative disc disease), lumbar    Social anxiety disorder    Mild major depression (H)    Fatigue    Vitamin D insufficiency    Elevated glucose    Protein-calorie malnutrition, unspecified severity    Hypothyroidism, unspecified type    Lung nodule, solitary    Pulmonary emphysema, unspecified emphysema type (H)     Past Surgical History:   Procedure Laterality Date    LEEP TX, CERVICAL      LEEP TX Cervical       Social History     Tobacco Use    Smoking status: Every Day     Current packs/day: 0.75     Average packs/day: 0.8 packs/day for 58.0 years (43.5 ttl pk-yrs)     Types: Cigarettes     Start date:     Smokeless tobacco: Never    Tobacco comments:     14 cigarettes a day   Substance Use Topics    Alcohol use: No     Family History   Problem Relation Age of Onset    Diabetes Mother     Cerebrovascular Disease Mother     Arthritis Mother     Eye Disorder Mother         Cataracts    Heart Disease Mother         CHF, A-Fib    Osteoporosis Mother     Thyroid Disease Mother          Hypothyroidism    Alzheimer Disease Father     Eye Disorder Father         Cataracts    Heart Disease Father         Bypass    Thyroid Disease Maternal Grandmother     Thyroid Disease Brother         Hyperthyroidism    Alcoholism Brother     Depression Sister     Thyroid Disease Sister     Thyroid Disease Daughter         Hypothyroid         Current Outpatient Medications   Medication Sig Dispense Refill    albuterol (PROAIR HFA/PROVENTIL HFA/VENTOLIN HFA) 108 (90 Base) MCG/ACT inhaler Inhale 2 puffs into the lungs every 6 hours 8.5 g 4    Cholecalciferol (VITAMIN D) 2000 UNIT tablet Take 1,000 Units by mouth daily       levothyroxine (SYNTHROID/LEVOTHROID) 50 MCG tablet Take 1 tablet (50 mcg) by mouth daily 90 tablet 1    OMEPRAZOLE PO       rosuvastatin (CRESTOR) 10 MG tablet Take 1 tablet (10 mg) by mouth daily 90 tablet 0     Allergies   Allergen Reactions    Ibuprofen Hives    Penicillins Hives    Tetracyclines & Related Hives       Current providers sharing in care for this patient include:  Patient Care Team:  Jessica Melgar PA-C as PCP - General (Family Medicine)  Cyndi Adam PA-C as Physician Assistant (Dermatology)  Jessica Melgar PA-C as Assigned PCP  Milton Stewart APRN CNP as Nurse Practitioner (Family Medicine)  Jennifer Diaz DO as Physician (Cardiovascular Disease)  Jennifer Diaz DO as Assigned Heart and Vascular Provider    The following health maintenance items are reviewed in Epic and correct as of today:  Health Maintenance   Topic Date Due    DEXA  Never done    COPD ACTION PLAN  Never done    Pneumococcal Vaccine: 50+ Years (1 of 2 - PCV) Never done    ZOSTER IMMUNIZATION (1 of 2) Never done    RSV VACCINE (1 - Risk 60-74 years 1-dose series) Never done    DTAP/TDAP/TD IMMUNIZATION (1 - Tdap) 10/14/2020    INFLUENZA VACCINE (1) Never done    COVID-19 Vaccine (3 - 2024-25 season) 09/01/2024    NICOTINE/TOBACCO CESSATION COUNSELING Q 1 YR  12/14/2024     "MEDICARE ANNUAL WELLNESS VISIT  12/14/2024    ANNUAL REVIEW OF HM ORDERS  12/14/2024    COLORECTAL CANCER SCREENING  12/29/2024    LUNG CANCER SCREENING  01/30/2025    LIPID  06/28/2025    TSH W/FREE T4 REFLEX  06/28/2025    PHQ-9  07/14/2025    FALL RISK ASSESSMENT  01/14/2026    MAMMO SCREENING  01/30/2026    GLUCOSE  12/14/2026    ADVANCE CARE PLANNING  12/14/2028    SPIROMETRY  Completed    HEPATITIS C SCREENING  Completed    DEPRESSION ACTION PLAN  Completed    HPV IMMUNIZATION  Aged Out    MENINGITIS IMMUNIZATION  Aged Out    RSV MONOCLONAL ANTIBODY  Aged Out         Review of Systems  Constitutional, HEENT, cardiovascular, pulmonary, GI, , musculoskeletal, neuro, skin, endocrine and psych systems are negative, except as otherwise noted.       Objective    Exam  /80 (BP Location: Right arm, Patient Position: Chair, Cuff Size: Adult Regular)   Pulse 84   Temp 97.5  F (36.4  C) (Oral)   Resp 20   Ht 1.626 m (5' 4\")   Wt 46.3 kg (102 lb)   SpO2 99%   BMI 17.51 kg/m     Estimated body mass index is 17.51 kg/m  as calculated from the following:    Height as of this encounter: 1.626 m (5' 4\").    Weight as of this encounter: 46.3 kg (102 lb).    Physical Exam  GENERAL: alert and no distress  EYES: Eyes grossly normal to inspection, PERRL and conjunctivae and sclerae normal  NECK: no adenopathy, no asymmetry, masses, or scars  RESP: lungs clear to auscultation - no rales, rhonchi or wheezes  CV: regular rate and rhythm, normal S1 S2, no S3 or S4, no murmur, click or rub, no peripheral edema  MS: no gross musculoskeletal defects noted, no edema  SKIN: no suspicious lesions or rashes  NEURO: Normal strength and tone, mentation intact and speech normal  PSYCH: mentation appears normal, affect normal/bright        1/14/2025   Mini Cog   Clock Draw Score 2 Normal   3 Item Recall 2 objects recalled   Mini Cog Total Score 4       Signed Electronically by: Jessica Melgar PA-C    Answers submitted by the patient " for this visit:  Patient Health Questionnaire (Submitted on 1/14/2025)  If you checked off any problems, how difficult have these problems made it for you to do your work, take care of things at home, or get along with other people?: Somewhat difficult  PHQ9 TOTAL SCORE: 8  Patient Health Questionnaire (G7) (Submitted on 1/14/2025)  VERÓNICA 7 TOTAL SCORE: 6

## 2025-01-14 NOTE — PATIENT INSTRUCTIONS
Patient Education   Preventive Care Advice   This is general advice given by our system to help you stay healthy. However, your care team may have specific advice just for you. Please talk to your care team about your preventive care needs.  Nutrition  Eat 5 or more servings of fruits and vegetables each day.  Try wheat bread, brown rice and whole grain pasta (instead of white bread, rice, and pasta).  Get enough calcium and vitamin D. Check the label on foods and aim for 100% of the RDA (recommended daily allowance).  Lifestyle  Exercise at least 150 minutes each week  (30 minutes a day, 5 days a week).  Do muscle strengthening activities 2 days a week. These help control your weight and prevent disease.  No smoking.  Wear sunscreen to prevent skin cancer.  Have a dental exam and cleaning every 6 months.  Yearly exams  See your health care team every year to talk about:  Any changes in your health.  Any medicines your care team has prescribed.  Preventive care, family planning, and ways to prevent chronic diseases.  Shots (vaccines)   HPV shots (up to age 26), if you've never had them before.  Hepatitis B shots (up to age 59), if you've never had them before.  COVID-19 shot: Get this shot when it's due.  Flu shot: Get a flu shot every year.  Tetanus shot: Get a tetanus shot every 10 years.  Pneumococcal, hepatitis A, and RSV shots: Ask your care team if you need these based on your risk.  Shingles shot (for age 50 and up)  General health tests  Diabetes screening:  Starting at age 35, Get screened for diabetes at least every 3 years.  If you are younger than age 35, ask your care team if you should be screened for diabetes.  Cholesterol test: At age 39, start having a cholesterol test every 5 years, or more often if advised.  Bone density scan (DEXA): At age 50, ask your care team if you should have this scan for osteoporosis (brittle bones).  Hepatitis C: Get tested at least once in your life.  STIs (sexually  transmitted infections)  Before age 24: Ask your care team if you should be screened for STIs.  After age 24: Get screened for STIs if you're at risk. You are at risk for STIs (including HIV) if:  You are sexually active with more than one person.  You don't use condoms every time.  You or a partner was diagnosed with a sexually transmitted infection.  If you are at risk for HIV, ask about PrEP medicine to prevent HIV.  Get tested for HIV at least once in your life, whether you are at risk for HIV or not.  Cancer screening tests  Cervical cancer screening: If you have a cervix, begin getting regular cervical cancer screening tests starting at age 21.  Breast cancer scan (mammogram): If you've ever had breasts, begin having regular mammograms starting at age 40. This is a scan to check for breast cancer.  Colon cancer screening: It is important to start screening for colon cancer at age 45.  Have a colonoscopy test every 10 years (or more often if you're at risk) Or, ask your provider about stool tests like a FIT test every year or Cologuard test every 3 years.  To learn more about your testing options, visit:   .  For help making a decision, visit:   https://bit.ly/vb14557.  Prostate cancer screening test: If you have a prostate, ask your care team if a prostate cancer screening test (PSA) at age 55 is right for you.  Lung cancer screening: If you are a current or former smoker ages 50 to 80, ask your care team if ongoing lung cancer screenings are right for you.  For informational purposes only. Not to replace the advice of your health care provider. Copyright   2023 [facility]. All rights reserved. Clinically reviewed by the [facility]. [employer] 607557 - REV 01/24.  Preventing Falls: Care Instructions  Injuries and health problems such as trouble walking or poor eyesight can increase your risk of falling. So can some medicines. But there are things you can do to help  "prevent falls. You can exercise to get stronger. You can also arrange your home to make it safer.    Talk to your doctor about the medicines you take. Ask if any of them increase the risk of falls and whether they can be changed or stopped.   Try to exercise regularly. It can help improve your strength and balance. This can help lower your risk of falling.         Practice fall safety and prevention.   Wear low-heeled shoes that fit well and give your feet good support. Talk to your doctor if you have foot problems that make this hard.  Carry a cellphone or wear a medical alert device that you can use to call for help.  Use stepladders instead of chairs to reach high objects. Don't climb if you're at risk for falls. Ask for help, if needed.  Wear the correct eyeglasses, if you need them.        Make your home safer.   Remove rugs, cords, clutter, and furniture from walkways.  Keep your house well lit. Use night-lights in hallways and bathrooms.  Install and use sturdy handrails on stairways.  Wear nonskid footwear, even inside. Don't walk barefoot or in socks without shoes.        Be safe outside.   Use handrails, curb cuts, and ramps whenever possible.  Keep your hands free by using a shoulder bag or backpack.  Try to walk in well-lit areas. Watch out for uneven ground, changes in pavement, and debris.  Be careful in the winter. Walk on the grass or gravel when sidewalks are slippery. Use de-icer on steps and walkways. Add non-slip devices to shoes.    Put grab bars and nonskid mats in your shower or tub and near the toilet. Try to use a shower chair or bath bench when bathing.   Get into a tub or shower by putting in your weaker leg first. Get out with your strong side first. Have a phone or medical alert device in the bathroom with you.   Where can you learn more?  Go to https://www.[employer].net/patiented  Enter G117 in the search box to learn more about \"Preventing Falls: Care Instructions.\"  Current as of: " July 31, 2024  Content Version: 14.3    2024 [employer].   Care instructions adapted under license by your healthcare professional. If you have questions about a medical condition or this instruction, always ask your healthcare professional. [employer] disclaims any warranty or liability for your use of this information.    Hearing Loss: Care Instructions  Overview     Hearing loss is a sudden or slow decrease in how well you hear. It can range from slight to profound. Permanent hearing loss can occur with aging. It also can happen when you are exposed long-term to loud noise. Examples include listening to loud music, riding motorcycles, or being around other loud machines.  Hearing loss can affect your work and home life. It can make you feel lonely or depressed. You may feel that you have lost your independence. But hearing aids and other devices can help you hear better and feel connected to others.  Follow-up care is a key part of your treatment and safety. Be sure to make and go to all appointments, and call your doctor if you are having problems. It's also a good idea to know your test results and keep a list of the medicines you take.  How can you care for yourself at home?  Avoid loud noises whenever possible. This helps keep your hearing from getting worse.  Always wear hearing protection around loud noises.  Wear a hearing aid as directed.  A professional can help you pick a hearing aid that will work best for you.  You can also get hearing aids over the counter for mild to moderate hearing loss.  Have hearing tests as your doctor suggests. They can show whether your hearing has changed. Your hearing aid may need to be adjusted.  Use other devices as needed. These may include:  Telephone amplifiers and hearing aids that can connect to a television, stereo, radio, or microphone.  Devices that use lights or vibrations. These alert you to the doorbell, a ringing telephone, or a baby  "monitor.  Television closed-captioning. This shows the words at the bottom of the screen. Most new TVs can do this.  TTY (text telephone). This lets you type messages back and forth on the telephone instead of talking or listening. These devices are also called TDD. When messages are typed on the keyboard, they are sent over the phone line to a receiving TTY. The message is shown on a monitor.  Use text messaging, social media, and email if it is hard for you to communicate by telephone.  Try to learn a listening technique called speechreading. It is not lipreading. You pay attention to people's gestures, expressions, posture, and tone of voice. These clues can help you understand what a person is saying. Face the person you are talking to, and have them face you. Make sure the lighting is good. You need to see the other person's face clearly.  Think about counseling if you need help to adjust to your hearing loss.  When should you call for help?  Watch closely for changes in your health, and be sure to contact your doctor if:    You think your hearing is getting worse.     You have new symptoms, such as dizziness or nausea.   Where can you learn more?  Go to https://www.[employer].net/patiented  Enter R798 in the search box to learn more about \"Hearing Loss: Care Instructions.\"  Current as of: September 27, 2023  Content Version: 14.3    2024 [employer].   Care instructions adapted under license by your healthcare professional. If you have questions about a medical condition or this instruction, always ask your healthcare professional. [employer] disclaims any warranty or liability for your use of this information.       "

## 2025-01-14 NOTE — LETTER
January 15, 2025      Jeannine Carpio  [address] [facility] [id number]        Dear ,    We are writing to inform you of your test results.    Your A1c has returned in the prediabetic range.  At this time it is going to be important for you to monitor your diet and decrease your carbohydrate intake, specifically limiting pastas, breads, rice, cereals, sugared beverages, desserts, etc.  This will be important to make these changes to help prevent progression to Diabetes.  Increasing exercise can also help.      Thyroid function is normal. Cholesterol levels are stable/normal.     CMP shows normal kidney function, liver function, and electrolyte levels.       Resulted Orders   Lipid panel reflex to direct LDL Fasting   Result Value Ref Range    Cholesterol 147 <200 mg/dL    Triglycerides 121 <150 mg/dL    Direct Measure HDL 57 >=50 mg/dL    LDL Cholesterol Calculated 66 <100 mg/dL    Non HDL Cholesterol 90 <130 mg/dL    Patient Fasting > 8hrs? Unknown     Narrative    Cholesterol  Desirable: < 200 mg/dL  Borderline High: 200 - 239 mg/dL  High: >= 240 mg/dL    Triglycerides  Normal: < 150 mg/dL  Borderline High: 150 - 199 mg/dL  High: 200-499 mg/dL  Very High: >= 500 mg/dL    Direct Measure HDL  Female: >= 50 mg/dL   Male: >= 40 mg/dL    LDL Cholesterol  Desirable: < 100 mg/dL  Above Desirable: 100 - 129 mg/dL   Borderline High: 130 - 159 mg/dL   High:  160 - 189 mg/dL   Very High: >= 190 mg/dL    Non HDL Cholesterol  Desirable: < 130 mg/dL  Above Desirable: 130 - 159 mg/dL  Borderline High: 160 - 189 mg/dL  High: 190 - 219 mg/dL  Very High: >= 220 mg/dL   TSH with free T4 reflex   Result Value Ref Range    TSH 3.71 0.30 - 4.20 uIU/mL   Hemoglobin A1c   Result Value Ref Range    Estimated Average Glucose 120 (H) <117 mg/dL    Hemoglobin A1C 5.8 (H) 0.0 - 5.6 %      Comment:      Normal <5.7%   Prediabetes 5.7-6.4%    Diabetes 6.5% or higher     Note: Adopted from ADA consensus guidelines.   Comprehensive  metabolic panel (BMP + Alb, Alk Phos, ALT, AST, Total. Bili, TP)   Result Value Ref Range    Sodium 139 135 - 145 mmol/L    Potassium 4.2 3.4 - 5.3 mmol/L    Carbon Dioxide (CO2) 28 22 - 29 mmol/L    Anion Gap 9 7 - 15 mmol/L    Urea Nitrogen 12.5 8.0 - 23.0 mg/dL    Creatinine 0.69 0.51 - 0.95 mg/dL    GFR Estimate >90 >60 mL/min/1.73m2      Comment:      eGFR calculated using 2021 CKD-EPI equation.    Calcium 9.7 8.8 - 10.4 mg/dL      Comment:      Reference intervals for this test were updated on 7/16/2024 to reflect our healthy population more accurately. There may be differences in the flagging of prior results with similar values performed with this method. Those prior results can be interpreted in the context of the updated reference intervals.    Chloride 102 98 - 107 mmol/L    Glucose 106 (H) 70 - 99 mg/dL    Alkaline Phosphatase 84 40 - 150 U/L    AST 29 0 - 45 U/L    ALT 26 0 - 50 U/L    Protein Total 6.8 6.4 - 8.3 g/dL    Albumin 4.4 3.5 - 5.2 g/dL    Bilirubin Total 0.9 <=1.2 mg/dL    Patient Fasting > 8hrs? Unknown        If you have any questions or concerns, please call the clinic at the number listed above.       Sincerely,      Jessica Melgar PA-C    Electronically signed

## 2025-01-27 NOTE — PROGRESS NOTES
SUBJECTIVE:   Jeannine Carpio is a 68 year old female who presents to clinic today for the following health issues:      Acute Illness   Acute illness concerns: 3 days  Onset: flu like sx    Fever: YES- 101F    Chills/Sweats: YES    Headache (location?): YES    Sinus Pressure:YES    Conjunctivitis:  no    Ear Pain: yes, R ear pain    Rhinorrhea: YES    Congestion: no     Sore Throat: YES     Cough: YES-productive of clear sputum    Wheeze: no    Decreased Appetite: YES    Nausea: YES    Vomiting: no    Diarrhea:  no     Dysuria/Freq.: no     Fatigue/Achiness: YES    Sick/Strep Exposure: YES- daughter's family had it     Therapies Tried and outcome: Tylenol doesn't help much      Problem list and histories reviewed & adjusted, as indicated.  Additional history: as documented    Patient Active Problem List   Diagnosis     Tobacco dependence     Arthralgia     Lumbar radiculopathy     CARDIOVASCULAR SCREENING; LDL GOAL LESS THAN 130     Vaginal atrophy     Menopause     DDD (degenerative disc disease), lumbar     Advanced directives, counseling/discussion     Social anxiety disorder     Mild major depression (H)     Fatigue     Vitamin D insufficiency     Elevated glucose     Protein-calorie malnutrition, unspecified severity (H)     Hypothyroidism, unspecified type     Lung nodule, solitary     Pulmonary emphysema, unspecified emphysema type (H)     Past Surgical History:   Procedure Laterality Date     LEEP TX, CERVICAL      LEEP TX Cervical       Social History     Tobacco Use     Smoking status: Current Every Day Smoker     Types: Cigarettes     Smokeless tobacco: Never Used   Substance Use Topics     Alcohol use: No     Family History   Problem Relation Age of Onset     Diabetes Mother      Cerebrovascular Disease Mother      Arthritis Mother      Eye Disorder Mother         Cataracts     Heart Disease Mother         CHF, A-Fib     Osteoporosis Mother      Thyroid Disease Mother         Hypothyroidism      Copied from CRM #[id number]. Topic: MW Clinical Concerns - MW Routine RN Triage  >> Jan 27, 2025  9:10 AM Duane J wrote:  Chapis called during working hours and mentioned a symptom(s) not on the Emergent symptom list.    Routine RN Triage provided by [facility].  Selected 'Wrap Up CRM' and created new Telephone Encounter after clicking 'Convert to Clinical Call'. Selected Reason For Call by searching symptom. Sent Routine Triage-Site message template and routed as routine priority per Care Site Dept KB page for Routine Triage Working Hrs support to appropriate clinician pool.  Readback full message. -- DO NOT REPLY / DO NOT REPLY ALL --  -- This inbox is not monitored. If this was sent to the wrong provider or department, reroute message to P ECO Reroute pool. --  -- Message is from Engagement Center Operations (ECO) --    Patient Name: Deon Zayas    Specialist or PCP Name:   Edilson JONES, Sophie ALANIS    Symptoms: Request call to set up Delivery  to Office Site for nalTREXone (Vivitrol) 380 MG injection     Pregnant (females aged 13-60. If Yes, how long?) : ?    Call Back # : [phone] - [facility]    Which State are you currently located in?: [place]    Name of Clinic Site / Acct# : [facility] [address] - [facility]         Alzheimer Disease Father      Eye Disorder Father         Cataracts     Heart Disease Father         Bypass     Thyroid Disease Maternal Grandmother      Thyroid Disease Brother         Hyperthyroidism     Depression Sister      Thyroid Disease Sister      Thyroid Disease Daughter         Hypothyroid         Current Outpatient Medications   Medication Sig Dispense Refill     Cholecalciferol (VITAMIN D) 2000 UNIT tablet Take 1 tablet by mouth daily.       levothyroxine (SYNTHROID/LEVOTHROID) 25 MCG tablet Take 1 tablet (25 mcg) by mouth daily 90 tablet 3     nicotine polacrilex ([facility] NICOTINE) 2 MG lozenge Place 1 lozenge (2 mg) inside cheek every hour as needed for smoking cessation 360 lozenge 1     OMEPRAZOLE PO        Allergies   Allergen Reactions     Ibuprofen Hives     Penicillins Hives     Tetracyclines Hives       Reviewed and updated as needed this visit by clinical staff       Reviewed and updated as needed this visit by Provider         ROS:  Constitutional, HEENT, cardiovascular, pulmonary, gi and gu systems are negative, except as otherwise noted.    OBJECTIVE:     /42 (BP Location: Right arm, Patient Position: Chair, Cuff Size: Adult Regular)   Pulse 96   Temp 98.4  F (36.9  C) (Oral)   Resp 16   Wt 45.4 kg (100 lb)   SpO2 98%   BMI 17.16 kg/m    Body mass index is 17.16 kg/m .  GENERAL: healthy, alert and no distress  EYES: Eyes grossly normal to inspection, PERRL and conjunctivae and sclerae normal  HENT: ear canals and TM's normal, nose and mouth without ulcers or lesions  RESP: lungs clear to auscultation - no rales, rhonchi or wheezes  CV: regular rate and rhythm, normal S1 S2, no S3 or S4, no murmur, click or rub, no peripheral edema and peripheral pulses strong  MS: no gross musculoskeletal defects noted, no edema  SKIN: no suspicious lesions or rashes  NEURO: Normal strength and tone, mentation intact and speech normal  PSYCH: mentation appears normal, affect normal/bright  LYMPH: no  cervical, supraclavicular, axillary, or inguinal adenopathy    Diagnostic Test Results:  Results for orders placed or performed in visit on 02/04/19 (from the past 24 hour(s))   Influenza A/B antigen   Result Value Ref Range    Influenza A/B Agn Specimen Nasal     Influenza A Negative NEG^Negative    Influenza B Negative NEG^Negative       ASSESSMENT/PLAN:       (J06.9,  B97.89) Viral URI with cough  (primary encounter diagnosis)    Comment: Influenza tests were negative. Treat conservatively with over the counter medications.    Plan: Influenza A/B antigen              Patient Instructions   Upper respiratory infections are usually caused by viruses and, sometimes certain bacteria.  Antibiotics don't help the vast majority of people recover any quicker even when caused by a bacteria.  The body will fight this infection but it needs to be treated well in order to help heal itself.  Rest as needed.  It is ok to reduce food intake if appetite is poor but it is quite important to maintain/increase fluid intake.    For cough, dextromethorphan/guaifenesin combinations help loosen secretions and suppress cough safely without significant risk of sedation. Often 2 puffs four times daily of an albuterol inhaler will help with bronchitis.  This is a prescription medicine.    For nasal congestion and sinus pressure, pseudoephedrine (Sudafed) or phenylephrine is often helpful but it can cause elevations in blood pressure and insomnia.  Short courses of a nasal decongestant spray (Afrin or Neosinephrine) can be appropriate but their use should be restricted to 3 days due to the high risk of nasal addiction.    For pain and fevers, acetaminophen (Tylenol) is most appropriate.  Ibuprofen (Advil) or naproxen (Aleve) are useful too and last longer but they can cause elevation of blood pressure or stomach problems.    Antihistamines (Benadryl, Dimetapp, etc.) cause sedation, confusion, bowel and urinary abnormalities and are of  little use for infectious causes of cough and nasal congestion.  Their use should be reserved for allergic symptoms.      Follow-up if not improving in 1 week or sooner if worsening.        Dustin Munoz PA-C  [facility]

## 2025-06-30 ENCOUNTER — OFFICE VISIT (OUTPATIENT)
Dept: INTERNAL MEDICINE | Facility: CLINIC | Age: 75
End: 2025-06-30
Payer: MEDICARE

## 2025-06-30 VITALS
RESPIRATION RATE: 14 BRPM | BODY MASS INDEX: 17.08 KG/M2 | SYSTOLIC BLOOD PRESSURE: 99 MMHG | DIASTOLIC BLOOD PRESSURE: 68 MMHG | HEART RATE: 87 BPM | OXYGEN SATURATION: 97 % | WEIGHT: 99.5 LBS | TEMPERATURE: 98.4 F

## 2025-06-30 DIAGNOSIS — L98.9 SKIN LESION: Primary | ICD-10-CM

## 2025-06-30 DIAGNOSIS — Z87.891 HISTORY OF TOBACCO USE, PRESENTING HAZARDS TO HEALTH: ICD-10-CM

## 2025-06-30 DIAGNOSIS — Z12.11 SCREEN FOR COLON CANCER: ICD-10-CM

## 2025-06-30 DIAGNOSIS — E03.9 HYPOTHYROIDISM, UNSPECIFIED TYPE: ICD-10-CM

## 2025-06-30 DIAGNOSIS — Z12.2 SCREENING FOR LUNG CANCER: ICD-10-CM

## 2025-06-30 PROCEDURE — 3078F DIAST BP <80 MM HG: CPT | Performed by: NURSE PRACTITIONER

## 2025-06-30 PROCEDURE — G2211 COMPLEX E/M VISIT ADD ON: HCPCS | Performed by: NURSE PRACTITIONER

## 2025-06-30 PROCEDURE — 3074F SYST BP LT 130 MM HG: CPT | Performed by: NURSE PRACTITIONER

## 2025-06-30 PROCEDURE — 1126F AMNT PAIN NOTED NONE PRSNT: CPT | Performed by: NURSE PRACTITIONER

## 2025-06-30 PROCEDURE — G0296 VISIT TO DETERM LDCT ELIG: HCPCS | Performed by: NURSE PRACTITIONER

## 2025-06-30 PROCEDURE — 99213 OFFICE O/P EST LOW 20 MIN: CPT | Performed by: NURSE PRACTITIONER

## 2025-06-30 ASSESSMENT — PATIENT HEALTH QUESTIONNAIRE - PHQ9
10. IF YOU CHECKED OFF ANY PROBLEMS, HOW DIFFICULT HAVE THESE PROBLEMS MADE IT FOR YOU TO DO YOUR WORK, TAKE CARE OF THINGS AT HOME, OR GET ALONG WITH OTHER PEOPLE: SOMEWHAT DIFFICULT
SUM OF ALL RESPONSES TO PHQ QUESTIONS 1-9: 6
SUM OF ALL RESPONSES TO PHQ QUESTIONS 1-9: 6

## 2025-06-30 ASSESSMENT — PAIN SCALES - GENERAL: PAINLEVEL_OUTOF10: NO PAIN (0)

## 2025-06-30 NOTE — PROGRESS NOTES
Assessment & Plan     (L98.9) Skin lesion  (primary encounter diagnosis)  Comment: shares that she first noticed the lesion about a year and a half ago on her left upper forehead. When the lesion initially presented, she states that it was small and flesh toned. The lesion has significantly grown over the last year and half. The lesion has not bled, but has changed appearance since its initial presentation. Suspicious for malignancy. Plan to refer to dermatology for further management and work up.     Plan: Adult Dermatology  Referral      (Z12.11) Screen for colon cancer  Comment: Due for FIT test colon cancer screening. Would like an order placed today.     Plan: Fecal colorectal cancer screen FIT - Future         (S+30)      (Z87.891) History of tobacco use, presenting hazards to health  (Z12.2) Screening for lung cancer  Comment: current smoker, has been smoking 0.75 pack per day for 58.5 years. She is not interested in cessation options at this time. Low dose lung CT ordered.     Plan: CT Chest Lung Cancer Screen Low Dose Without     Hypothyroidism  -stable on Levothyroxine     The longitudinal plan of care for the diagnosis(es)/condition(s) as documented were addressed during this visit. Due to the added complexity in care, I will continue to support Jeannine in the subsequent management and with ongoing continuity of care.        Subjective   Jeannine is a 75 year old, presenting for the following health issues:  Establish Care and Mass (L forehead, changed colors/shape)        6/30/2025    10:21 AM   Additional Questions   Roomed by hope r   Accompanied by self         6/30/2025    10:21 AM   Patient Reported Additional Medications   Patient reports taking the following new medications n/a     Mass    History of Present Illness       Reason for visit:  A large dark circular thing on my forehead  Symptom onset:  More than a month  Symptoms include:  It changes in apperence  Symptom intensity:   Moderate  Symptom progression:  Worsening  Had these symptoms before:  No  What makes it worse:  No  What makes it better:  No   She is taking medications regularly.        Shares that she has noticed a lesion on her left forehead that started about a year and a half ago. She states that when she initially noticed the lesion it was small and skin toned, over the last year it has grown substantially, and has started to protrude over the last 2-3 months. She shares that it has changed in appearance multiple times over the last year and a half, changing from red and swollen, to black. The lesion does not hurt. The lesion has not bled. She reports that her father has had skin cancer on his forehead before, she is unsure what type if cancer it was but states that he did not receive treatment for it.             Review of Systems  Constitutional, neuro, ENT, endocrine, pulmonary, cardiac, gastrointestinal, genitourinary, musculoskeletal, integument and psychiatric systems are negative, except as otherwise noted.      Objective    BP 99/68   Pulse 87   Temp 98.4  F (36.9  C) (Tympanic)   Resp 14   Wt 45.1 kg (99 lb 8 oz)   SpO2 97%   BMI 17.08 kg/m    Body mass index is 17.08 kg/m .  Physical Exam   GENERAL: alert and no distress  RESP: lungs clear to auscultation - no rales, rhonchi or wheezes  CV: regular rate and rhythm, normal S1 S2, no S3 or S4, no murmur, click or rub, no peripheral edema  SKIN: Lesion noted on left forehead, approx. 1.2 cm in diameter. Raised, irregular boarders, variation in color from flesh tones to brown/black. Hard to touch.                 Tamiko Hansen RN, BSN - FNP Student on 6/30/2025 at 11:54 AM    Patient was seen with student who acted as my scribe and was present for learning. I personally assessed, examined and made medical decisions reflected in the documentation. Any necessary edits to the note have been made by myself       Signed Electronically by: Ana Wang, CHARLES

## 2025-07-14 ENCOUNTER — ORDERS ONLY (AUTO-RELEASED) (OUTPATIENT)
Dept: INTERNAL MEDICINE | Facility: CLINIC | Age: 75
End: 2025-07-14
Payer: MEDICARE

## 2025-07-23 ENCOUNTER — HOSPITAL ENCOUNTER (OUTPATIENT)
Dept: CT IMAGING | Facility: CLINIC | Age: 75
Discharge: HOME | End: 2025-07-23
Attending: NURSE PRACTITIONER
Payer: MEDICARE

## 2025-07-23 DIAGNOSIS — Z12.2 SCREENING FOR LUNG CANCER: ICD-10-CM

## 2025-07-23 DIAGNOSIS — Z87.891 HISTORY OF TOBACCO USE, PRESENTING HAZARDS TO HEALTH: ICD-10-CM

## 2025-07-23 PROCEDURE — 71271 CT THORAX LUNG CANCER SCR C-: CPT
